# Patient Record
Sex: FEMALE | Race: WHITE | NOT HISPANIC OR LATINO | Employment: OTHER | URBAN - METROPOLITAN AREA
[De-identification: names, ages, dates, MRNs, and addresses within clinical notes are randomized per-mention and may not be internally consistent; named-entity substitution may affect disease eponyms.]

---

## 2019-08-12 ENCOUNTER — TELEPHONE (OUTPATIENT)
Dept: FAMILY MEDICINE CLINIC | Facility: CLINIC | Age: 79
End: 2019-08-12

## 2019-08-12 ENCOUNTER — OFFICE VISIT (OUTPATIENT)
Dept: FAMILY MEDICINE CLINIC | Facility: CLINIC | Age: 79
End: 2019-08-12

## 2019-08-12 VITALS
WEIGHT: 130.6 LBS | BODY MASS INDEX: 24.66 KG/M2 | DIASTOLIC BLOOD PRESSURE: 60 MMHG | HEART RATE: 93 BPM | TEMPERATURE: 98.2 F | OXYGEN SATURATION: 95 % | SYSTOLIC BLOOD PRESSURE: 118 MMHG | HEIGHT: 61 IN

## 2019-08-12 DIAGNOSIS — M25.571 ARTHRALGIA OF RIGHT ANKLE: ICD-10-CM

## 2019-08-12 DIAGNOSIS — K21.9 GASTROESOPHAGEAL REFLUX DISEASE WITHOUT ESOPHAGITIS: ICD-10-CM

## 2019-08-12 DIAGNOSIS — I10 ESSENTIAL HYPERTENSION: ICD-10-CM

## 2019-08-12 DIAGNOSIS — I51.9 HEART DISEASE: ICD-10-CM

## 2019-08-12 DIAGNOSIS — F41.9 ANXIETY: ICD-10-CM

## 2019-08-12 DIAGNOSIS — Z79.01 ANTICOAGULATED ON WARFARIN: ICD-10-CM

## 2019-08-12 DIAGNOSIS — I48.20 CHRONIC ATRIAL FIBRILLATION (HCC): Primary | ICD-10-CM

## 2019-08-12 PROBLEM — E78.5 HYPERLIPIDEMIA: Status: ACTIVE | Noted: 2019-08-12

## 2019-08-12 PROBLEM — I48.91 A-FIB (HCC): Status: ACTIVE | Noted: 2019-08-12

## 2019-08-12 LAB — INR PPP: 2 (ref 2–3)

## 2019-08-12 PROCEDURE — 36416 COLLJ CAPILLARY BLOOD SPEC: CPT | Performed by: INTERNAL MEDICINE

## 2019-08-12 PROCEDURE — 99213 OFFICE O/P EST LOW 20 MIN: CPT | Performed by: INTERNAL MEDICINE

## 2019-08-12 PROCEDURE — 85610 PROTHROMBIN TIME: CPT | Performed by: INTERNAL MEDICINE

## 2019-08-12 RX ORDER — METOPROLOL SUCCINATE 50 MG/1
50 TABLET, EXTENDED RELEASE ORAL DAILY
Qty: 90 TABLET | Refills: 3 | Status: SHIPPED | OUTPATIENT
Start: 2019-08-12 | End: 2020-09-09

## 2019-08-12 RX ORDER — PRAVASTATIN SODIUM 20 MG
TABLET ORAL
COMMUNITY
Start: 2017-07-24 | End: 2020-06-18 | Stop reason: SDUPTHER

## 2019-08-12 RX ORDER — WARFARIN SODIUM 3 MG/1
3 TABLET ORAL DAILY
COMMUNITY
End: 2019-08-12 | Stop reason: SDUPTHER

## 2019-08-12 RX ORDER — DILTIAZEM HYDROCHLORIDE 180 MG/1
180 CAPSULE, COATED, EXTENDED RELEASE ORAL DAILY
Qty: 90 CAPSULE | Refills: 1 | Status: SHIPPED | OUTPATIENT
Start: 2019-08-12 | End: 2020-01-06 | Stop reason: SDUPTHER

## 2019-08-12 RX ORDER — DIGOXIN 125 MCG
125 TABLET ORAL DAILY
Qty: 90 TABLET | Refills: 1 | Status: SHIPPED | OUTPATIENT
Start: 2019-08-12 | End: 2020-05-06 | Stop reason: SDUPTHER

## 2019-08-12 RX ORDER — DILTIAZEM HYDROCHLORIDE 180 MG/1
180 CAPSULE, COATED, EXTENDED RELEASE ORAL DAILY
COMMUNITY
End: 2019-08-12 | Stop reason: SDUPTHER

## 2019-08-12 RX ORDER — FUROSEMIDE 20 MG/1
TABLET ORAL
Qty: 270 TABLET | Refills: 1 | Status: SHIPPED | OUTPATIENT
Start: 2019-08-12 | End: 2020-01-06 | Stop reason: SDUPTHER

## 2019-08-12 RX ORDER — WARFARIN SODIUM 3 MG/1
3 TABLET ORAL DAILY
Qty: 90 TABLET | Refills: 1 | Status: SHIPPED | OUTPATIENT
Start: 2019-08-12 | End: 2019-09-16 | Stop reason: SDUPTHER

## 2019-08-12 RX ORDER — METOPROLOL SUCCINATE 100 MG/1
25 TABLET, EXTENDED RELEASE ORAL DAILY
COMMUNITY
End: 2019-08-12 | Stop reason: SDUPTHER

## 2019-08-12 RX ORDER — ESOMEPRAZOLE MAGNESIUM 40 MG/1
40 CAPSULE, DELAYED RELEASE ORAL DAILY
Qty: 90 CAPSULE | Refills: 1 | Status: SHIPPED | OUTPATIENT
Start: 2019-08-12 | End: 2020-01-06 | Stop reason: SDUPTHER

## 2019-08-12 RX ORDER — FUROSEMIDE 20 MG/1
TABLET ORAL
COMMUNITY
Start: 2017-07-26 | End: 2019-08-12 | Stop reason: SDUPTHER

## 2019-08-12 RX ORDER — ALPRAZOLAM 0.5 MG/1
0.5 TABLET ORAL
COMMUNITY
End: 2019-08-12 | Stop reason: SDUPTHER

## 2019-08-12 RX ORDER — ESOMEPRAZOLE MAGNESIUM 40 MG/1
40 CAPSULE, DELAYED RELEASE ORAL DAILY
COMMUNITY
End: 2019-08-12 | Stop reason: SDUPTHER

## 2019-08-12 RX ORDER — DIGOXIN 125 MCG
125 TABLET ORAL DAILY
COMMUNITY
End: 2019-08-12 | Stop reason: SDUPTHER

## 2019-08-12 RX ORDER — ALPRAZOLAM 0.5 MG/1
0.5 TABLET ORAL 3 TIMES DAILY PRN
Qty: 90 TABLET | Refills: 1 | Status: SHIPPED | OUTPATIENT
Start: 2019-08-12 | End: 2020-01-06 | Stop reason: SDUPTHER

## 2019-08-12 RX ORDER — ESTRADIOL 0.1 MG/G
1 CREAM VAGINAL 2 TIMES WEEKLY
COMMUNITY
Start: 2018-05-03 | End: 2022-11-17

## 2019-08-12 NOTE — TELEPHONE ENCOUNTER
Pharmacy called regarding the prescription for diclosenac sodium, they do not have and wants to know if there is something different he can take.  Phone number is [phone].

## 2019-08-12 NOTE — PROGRESS NOTES
Sweetie Rojo is a 79 y.o. female.     Chief Complaint   Patient presents with   • Labs Only     inr check   • Mitral Insufficiency       History of Present Illness   Here for follow up INR evaluation.  Currently anticoagulated with warfarin for atrial fib.  The patient is taking warfarin 3 mg tab 1/2 tab M/W/F and full tab rest of the week.  The current INR goal is 2-3.  The INR is currently 2.0.  No significant interval events, easy bleeding, bruising, epistaxis, fever, weakness or numbness.    Follow-up for hypertension.  Currently has been feeling well and asymptomatic without any headaches,vision changes, cough, chest pain, shortness of breath, swelling, focal neurologic deficit, memory loss or syncope.  Has been taking the medications regularly and adherent with the regimen, Denies medication side effects and no significant interval events.      The following portions of the patient's history were reviewed and updated as appropriate: allergies, current medications, past family history, past medical history, past social history, past surgical history and problem list.    Past Medical History:   Diagnosis Date   • A-fib (CMS/HCC)    • Abnormality of lung    • Acquired insufficiency of aortic valve    • Antral gastritis    • Anxiety    • Asymptomatic postmenopausal status    • Atrial fibrillation (CMS/HCC)    • Body mass index (BMI) 24.0-24.9, adult    • Chest pain, atypical    • COPD (chronic obstructive pulmonary disease) (CMS/HCC)    • Diverticulosis of colon    • ASHLEE (generalized anxiety disorder)    • GERD (gastroesophageal reflux disease)    • Heart disease    • Hematuria    • Hiatal hernia    • High risk medication use    • Hyperactive bowel sounds    • Hyperlipidemia    • Hypertension    • Insomnia    • Intermittent claudication (CMS/HCC)    • Internal hemorrhoids    • Lower extremity edema    • Mitral insufficiency    • KOKO (obstructive sleep apnea)    • Osteopenia    • Pacemaker    • Pain  of left patella    • Pedal edema    • Pulmonary hypertension (CMS/HCC)    • SSS (sick sinus syndrome) (CMS/HCC)    • Tricuspid regurgitation    • Urine frequency    • Uterovaginal prolapse    • Wheezing        Past Surgical History:   Procedure Laterality Date   • COLONOSCOPY  01/19/2015    diverticuli and hemorrhoids   • COLONOSCOPY  02/03/2009    diverticuli   • PACEMAKER IMPLANTATION     • PACEMAKER IMPLANTATION      SERIAL # [id number], model #[id number]   • UPPER GASTROINTESTINAL ENDOSCOPY  02/03/2009    small hiatal hernia   • UPPER GASTROINTESTINAL ENDOSCOPY  01/19/2015    sliding small hiatal hernia       Family History   Problem Relation Age of Onset   • Cancer Mother         oral-mouth   • Coronary artery disease Mother    • Cancer Father         oral-mouth   • Cancer Brother         cholangiocarcinoma/ oral-mouth   • Leukemia Brother        Social History     Socioeconomic History   • Marital status:      Spouse name: Not on file   • Number of children: Not on file   • Years of education: Not on file   • Highest education level: Not on file   Tobacco Use   • Smoking status: Former Smoker   Substance and Sexual Activity   • Alcohol use: No     Frequency: Never       Review of Systems   Constitutional: Negative for activity change, appetite change, unexpected weight gain and unexpected weight loss.   HENT: Negative for hearing loss, trouble swallowing and voice change.    Eyes: Negative for visual disturbance.   Respiratory: Negative for cough, choking, chest tightness and shortness of breath.    Cardiovascular: Negative for chest pain, palpitations and leg swelling.   Gastrointestinal: Negative for anal bleeding, nausea, vomiting and GERD.   Musculoskeletal: Positive for arthralgias. Negative for back pain and myalgias.   Neurological: Negative for tremors, weakness, numbness and headache.   Psychiatric/Behavioral: Negative for sleep disturbance and depressed mood. The patient is not nervous/anxious.         Objective   Vitals:    08/12/19 1116   BP: 118/60   Pulse: 93   Temp: 98.2 °F (36.8 °C)   SpO2: 95%     Body mass index is 24.68 kg/m².  Physical Exam   Constitutional: She is oriented to person, place, and time. She appears well-developed and well-nourished. No distress.   HENT:   Head: Normocephalic and atraumatic.   Right Ear: External ear normal.   Left Ear: External ear normal.   Nose: Nose normal.   Mouth/Throat: Oropharynx is clear and moist.   Eyes: Conjunctivae and EOM are normal. Pupils are equal, round, and reactive to light.   Neck: Normal range of motion. Neck supple. No tracheal deviation present. No thyromegaly present.   Cardiovascular: Normal rate, normal heart sounds and intact distal pulses. An irregularly irregular rhythm present. Exam reveals no gallop and no friction rub.   No murmur heard.  Pulmonary/Chest: Effort normal and breath sounds normal. No respiratory distress. She exhibits no tenderness.   Abdominal: Soft. Bowel sounds are normal. She exhibits no distension. There is no tenderness.   Musculoskeletal: Normal range of motion. She exhibits no edema or tenderness.   Right ankle with tenderness medially with mild swelling. No erythema or crepitus.   Lymphadenopathy:     She has no cervical adenopathy.   Neurological: She is alert and oriented to person, place, and time. She displays normal reflexes. No cranial nerve deficit or sensory deficit. Coordination normal.   Skin: Skin is warm. Capillary refill takes less than 2 seconds. She is not diaphoretic.   Left upper chest pacemaker site mild tender without erythema or open lesions.   Psychiatric: She has a normal mood and affect. Her behavior is normal. Judgment and thought content normal.   Nursing note and vitals reviewed.      Recent Results (from the past 2016 hour(s))   POC INR    Collection Time: 08/12/19 11:29 AM   Result Value Ref Range    INR 2.00 (NEGATIVE) 2 - 3     Assessment/Plan   Stacey was seen today for labs only  and mitral insufficiency.    Diagnoses and all orders for this visit:    Chronic atrial fibrillation (CMS/HCC)    Anticoagulated on warfarin    Essential hypertension    Anxiety    Heart disease    Gastroesophageal reflux disease without esophagitis    Arthralgia of right ankle  -     Diclofenac Sodium 3 % cream; Apply 2 g topically 2 (Two) Times a Day.    Other orders  -     POC INR  -     metoprolol succinate XL (TOPROL-XL) 50 MG 24 hr tablet; Take 1 tablet by mouth Daily.  -     digoxin (LANOXIN) 125 MCG tablet; Take 1 tablet by mouth Daily.  -     furosemide (LASIX) 20 MG tablet; Take 2 tablets by mouth Every Morning AND 1 tablet Every Evening.  -     diltiaZEM CD (CARDIZEM CD) 180 MG 24 hr capsule; Take 1 capsule by mouth Daily.  -     warfarin (COUMADIN) 3 MG tablet; Take 1 tablet by mouth Daily.  -     ALPRAZolam (XANAX) 0.5 MG tablet; Take 1 tablet by mouth 3 (Three) Times a Day As Needed for Anxiety.  -     esomeprazole (nexIUM) 40 MG capsule; Take 1 capsule by mouth Daily.    Continue the current medications unchanged including the warfarin.  Will try the topical diclofenac as is on warfarin and history of GI issues.  EMILIA run and reviewed.  Risks of the medication include but are not limited to fatigue, somnolence, increased risk of falls, constipation, allergic reaction, dependence, and addiction

## 2019-08-13 ENCOUNTER — TELEPHONE (OUTPATIENT)
Dept: OTHER | Facility: OTHER | Age: 79
End: 2019-08-13

## 2019-08-13 NOTE — TELEPHONE ENCOUNTER
Pts pharmacy called and they are just needing clarification on how many drops pt is supposed to apply of the diclofenac solution. They also said if you change it to the diclofenac gel they wouldn't need specific quantity. Please send over  rx.

## 2019-08-13 NOTE — TELEPHONE ENCOUNTER
Pt states that she spoke with the [facility] pharmacy and they told her that they were needing a preauth on the medication that was sent in yesterday after the patient's appointment. Please advise.

## 2019-08-15 ENCOUNTER — TELEPHONE (OUTPATIENT)
Dept: FAMILY MEDICINE CLINIC | Facility: CLINIC | Age: 79
End: 2019-08-15

## 2019-08-15 NOTE — TELEPHONE ENCOUNTER
The medication list sent from [place] [facility] had diltiazem(cardizem/cartia) on her active medication list.  If she was not taking it at the time of her visit with me then she should not take it now.

## 2019-08-15 NOTE — TELEPHONE ENCOUNTER
Patient says Diltiavem was filled. Says she hesnt taken in years and wants to know if something changed or if it was a mistake.   [phone]

## 2019-08-15 NOTE — TELEPHONE ENCOUNTER
Called patient, she said she hasn't taken diltiazem but she is taking cartia, can you please call patient to clarify what she is to be taking. thanks

## 2019-08-19 ENCOUNTER — TELEPHONE (OUTPATIENT)
Dept: FAMILY MEDICINE CLINIC | Facility: CLINIC | Age: 79
End: 2019-08-19

## 2019-08-19 NOTE — TELEPHONE ENCOUNTER
Patient called regarding her prescription for Warfarin.  She says the pharmacy is telling her it was denied.  When I look at the prescription, it states it was sent over on 08/12 and the pharmacy sent a receipt at 12:12.  She would like to check the status please.  [facility]acy is [facility].  Her phone number is [phone].

## 2019-09-09 ENCOUNTER — TELEPHONE (OUTPATIENT)
Dept: OTHER | Facility: OTHER | Age: 79
End: 2019-09-09

## 2019-09-09 NOTE — TELEPHONE ENCOUNTER
DR SARABIA--    PT IS ON BLOOD THINNERS AND HAD A NOSE BLEED OVER THE WEEKEND. SHE WANTED TO CHECK WITH DR SARABIA TO SEE IF HE THOUGHT SHE NEEDED TO COME IN AND SEE HIM OR NOT.    PLEASE RETURN PT CALL.    PT CONTACT [phone]

## 2019-09-16 ENCOUNTER — OFFICE VISIT (OUTPATIENT)
Dept: FAMILY MEDICINE CLINIC | Facility: CLINIC | Age: 79
End: 2019-09-16

## 2019-09-16 VITALS
SYSTOLIC BLOOD PRESSURE: 126 MMHG | DIASTOLIC BLOOD PRESSURE: 58 MMHG | WEIGHT: 131 LBS | BODY MASS INDEX: 24.73 KG/M2 | HEART RATE: 95 BPM | HEIGHT: 61 IN | TEMPERATURE: 98.7 F | OXYGEN SATURATION: 95 %

## 2019-09-16 DIAGNOSIS — E78.5 HYPERLIPIDEMIA, UNSPECIFIED HYPERLIPIDEMIA TYPE: ICD-10-CM

## 2019-09-16 DIAGNOSIS — Z00.00 MEDICARE ANNUAL WELLNESS VISIT, SUBSEQUENT: Primary | ICD-10-CM

## 2019-09-16 DIAGNOSIS — Z79.01 ANTICOAGULATED ON WARFARIN: ICD-10-CM

## 2019-09-16 DIAGNOSIS — I10 ESSENTIAL HYPERTENSION: ICD-10-CM

## 2019-09-16 DIAGNOSIS — I48.20 CHRONIC ATRIAL FIBRILLATION (HCC): ICD-10-CM

## 2019-09-16 LAB — INR PPP: 1.9 (ref 2–3)

## 2019-09-16 PROCEDURE — 36416 COLLJ CAPILLARY BLOOD SPEC: CPT | Performed by: INTERNAL MEDICINE

## 2019-09-16 PROCEDURE — 85610 PROTHROMBIN TIME: CPT | Performed by: INTERNAL MEDICINE

## 2019-09-16 PROCEDURE — G0439 PPPS, SUBSEQ VISIT: HCPCS | Performed by: INTERNAL MEDICINE

## 2019-09-16 PROCEDURE — 99213 OFFICE O/P EST LOW 20 MIN: CPT | Performed by: INTERNAL MEDICINE

## 2019-09-16 RX ORDER — WARFARIN SODIUM 3 MG/1
3 TABLET ORAL DAILY
Qty: 90 TABLET | Refills: 1 | Status: SHIPPED | OUTPATIENT
Start: 2019-09-16 | End: 2020-04-15 | Stop reason: SDUPTHER

## 2019-09-16 NOTE — PROGRESS NOTES
Subsequent Medicare Wellness Visit   The ABC's of the Annual Wellness Visit    Chief Complaint   Patient presents with   • Annual Exam     medicare   • Atrial Fibrillation   • Hypertension   • Hyperlipidemia       HPI:  Stacey Rojo, -1940, is a 79 y.o. female who presents for a Subsequent Medicare Wellness Visit.  Granddaughter was present during the history-taking and subsequent discussion (and for part of the physical exam) with this patient.  Patient agrees to the presence of the individual during this visit.  Here for follow up INR evaluation.  Currently anticoagulated with warfarin for atrial fibrillation.  The patient is taking warfarin 3mg T, Th, S, S and 1.5 mg M,W,.  The current INR goal is 2-3.  The INR is currently 1.9.  No significant interval events such as easy bleeding, bruising, fever, weakness or numbness.  She did have an episode of significant nosebleed over the weekend but it did self limit its course.  Further after that episode.  Follow-up for cholesterol.  Currently has been feeling well without any myalgias, muscle aches, weakness, numbness, chest pain, short of breath or other issues.  Currently is adherent with medication regimen and denies medication side effects. Is due for lab follow-up.  Follow-up for hypertension.  Currently has been feeling well and asymptomatic without any headaches,vision changes, cough, chest pain, shortness of breath, swelling, focal neurologic deficit, memory loss or syncope.  Has been taking the medications regularly and adherent with the regimen, Denies medication side effects and no significant interval events.     Recent Hospitalizations:  No hospitalization(s) within the last year..    Current Medical Providers:  Patient Care Team:  Serg Varela MD as PCP - General (Internal Medicine)   Dr Cowart - urogynecologist  Dr Wing - cardiology  Dr Dubois - podiatry.    Health Habits and Functional and Cognitive Screening and Depression  Screening:  Functional & Cognitive Status 9/16/2019   Do you have difficulty preparing food and eating? No   Do you have difficulty bathing yourself, getting dressed or grooming yourself? No   Do you have difficulty using the toilet? No   Do you have difficulty moving around from place to place? No   Do you have trouble with steps or getting out of a bed or a chair? No   Current Diet Low Fat Diet   Dental Exam Not up to date   Eye Exam Up to date   Exercise (times per week) 0 times per week   Current Exercise Activities Include None   Do you need help using the phone?  No   Are you deaf or do you have serious difficulty hearing?  No   Do you need help with transportation? No   Do you need help shopping? No   Do you need help preparing meals?  No   Do you need help with housework?  No   Do you need help with laundry? No   Do you need help taking your medications? No   Do you need help managing money? No   Do you ever drive or ride in a car without wearing a seat belt? No   Have you felt unusual stress, anger or loneliness in the last month? No   Who do you live with? Child   If you need help, do you have trouble finding someone available to you? No   Have you been bothered in the last four weeks by sexual problems? No   Do you have difficulty concentrating, remembering or making decisions? No       Compared to one year ago, the patient feels her physical health is the same and her mental health is the same.    Depression Screen:  PHQ-2/PHQ-9 Depression Screening 9/16/2019   Little interest or pleasure in doing things 0   Feeling down, depressed, or hopeless 0   Total Score 0         Past Medical/Family/Social History:  The following portions of the patient's history were reviewed and updated as appropriate: allergies, current medications, past family history, past medical history, past social history, past surgical history and problem list.    No Known Allergies      Current Outpatient Medications:   •  ALPRAZolam  (XANAX) 0.5 MG tablet, Take 1 tablet by mouth 3 (Three) Times a Day As Needed for Anxiety., Disp: 90 tablet, Rfl: 1  •  digoxin (LANOXIN) 125 MCG tablet, Take 1 tablet by mouth Daily., Disp: 90 tablet, Rfl: 1  •  esomeprazole (nexIUM) 40 MG capsule, Take 1 capsule by mouth Daily., Disp: 90 capsule, Rfl: 1  •  estradiol (ESTRACE) 0.1 MG/GM vaginal cream, Insert 1 g into the vagina 2 (Two) Times a Week., Disp: , Rfl:   •  furosemide (LASIX) 20 MG tablet, Take 2 tablets by mouth Every Morning AND 1 tablet Every Evening., Disp: 270 tablet, Rfl: 1  •  metoprolol succinate XL (TOPROL-XL) 50 MG 24 hr tablet, Take 1 tablet by mouth Daily., Disp: 90 tablet, Rfl: 3  •  pravastatin (PRAVACHOL) 20 MG tablet, , Disp: , Rfl:   •  warfarin (COUMADIN) 3 MG tablet, Take 1 tablet by mouth Daily., Disp: 90 tablet, Rfl: 1  •  diclofenac (VOLTAREN) 1 % gel gel, Apply 4 g topically to the appropriate area as directed 4 (Four) Times a Day As Needed (pain and arthralgia)., Disp: 100 g, Rfl: 3  •  diltiaZEM CD (CARDIZEM CD) 180 MG 24 hr capsule, Take 1 capsule by mouth Daily., Disp: 90 capsule, Rfl: 1  •  PROBIOTIC PRODUCT PO, Take  by mouth., Disp: , Rfl:     Aspirin use counseling: Does not need ASA (and currently is not on it)    Current medication list contains no high risk medications.  No harmful drug interactions have been identified.     Family History   Problem Relation Age of Onset   • Cancer Mother         oral-mouth   • Coronary artery disease Mother    • Cancer Father         oral-mouth   • Cancer Brother         cholangiocarcinoma/ oral-mouth   • Leukemia Brother        Social History     Tobacco Use   • Smoking status: Former Smoker   • Smokeless tobacco: Never Used   Substance Use Topics   • Alcohol use: No     Frequency: Never       Past Surgical History:   Procedure Laterality Date   • COLONOSCOPY  01/19/2015    diverticuli and hemorrhoids   • COLONOSCOPY  02/03/2009    diverticuli   • PACEMAKER IMPLANTATION     •  "PACEMAKER IMPLANTATION      SERIAL # [id number], model #[id number]   • UPPER GASTROINTESTINAL ENDOSCOPY  02/03/2009    small hiatal hernia   • UPPER GASTROINTESTINAL ENDOSCOPY  01/19/2015    sliding small hiatal hernia       Patient Active Problem List   Diagnosis   • Anticoagulated on warfarin   • A-fib (CMS/HCC)   • Hypertension   • Anxiety   • Hyperlipidemia   • GERD (gastroesophageal reflux disease)   • Heart disease   • Arthralgia of right ankle   • Atrophic vaginitis   • Cervical polyp   • Pacemaker   • Medicare annual wellness visit, subsequent       Review of Systems   Constitutional: Negative for activity change, appetite change, fever and unexpected weight change.   HENT: Negative for sinus pressure, sore throat and trouble swallowing.    Eyes: Negative for visual disturbance.   Respiratory: Negative for choking, chest tightness, shortness of breath and wheezing.    Cardiovascular: Negative for chest pain, palpitations and leg swelling.   Gastrointestinal: Negative for abdominal distention, abdominal pain, blood in stool, constipation, diarrhea, nausea and vomiting.   Endocrine: Negative for polyuria.   Genitourinary: Negative for dysuria, hematuria and urgency.   Musculoskeletal: Negative for back pain and gait problem.   Skin: Negative for color change and rash.   Neurological: Negative for dizziness, seizures and numbness.   Hematological: Negative for adenopathy. Does not bruise/bleed easily.   Psychiatric/Behavioral: Negative for confusion. The patient is not nervous/anxious.        Objective     Vitals:    09/16/19 1009   BP: 126/58   BP Location: Right arm   Patient Position: Sitting   Cuff Size: Large Adult   Pulse: 95   Temp: 98.7 °F (37.1 °C)   SpO2: 95%   Weight: 59.4 kg (131 lb)   Height: 154.9 cm (61\")       Patient's Body mass index is 24.75 kg/m². BMI is within normal parameters. No follow-up required..      No exam data present    The patient has no evidence of cognitve impairment.     Physical " Exam   Constitutional: She is oriented to person, place, and time. She appears well-developed and well-nourished. No distress.   HENT:   Head: Normocephalic and atraumatic.   Right Ear: External ear normal.   Left Ear: External ear normal.   Nose: Nose normal.   Mouth/Throat: Oropharynx is clear and moist.   Eyes: Conjunctivae and EOM are normal. Pupils are equal, round, and reactive to light.   Neck: Normal range of motion. Neck supple. No tracheal deviation present. No thyromegaly present.   Cardiovascular: Normal rate, regular rhythm, normal heart sounds and intact distal pulses. Exam reveals no gallop and no friction rub.   No murmur heard.  Pulmonary/Chest: Effort normal and breath sounds normal. No respiratory distress.   Abdominal: Soft. Bowel sounds are normal. She exhibits no mass. There is no tenderness. There is no guarding.   Musculoskeletal: Normal range of motion. She exhibits no edema.   Lymphadenopathy:     She has no cervical adenopathy.   Neurological: She is alert and oriented to person, place, and time. She displays normal reflexes. She exhibits normal muscle tone.   Skin: Skin is warm and dry. Capillary refill takes less than 2 seconds. No rash noted. She is not diaphoretic.   Psychiatric: She has a normal mood and affect. Her behavior is normal. Judgment and thought content normal.   Nursing note and vitals reviewed.      Recent Lab Results:          Assessment/Plan   Age-appropriate Screening Schedule:  Refer to the list below for future screening recommendations based on patient's age, sex and/or medical conditions.      Health Maintenance   Topic Date Due   • TDAP/TD VACCINES (1 - Tdap) 05/24/1961   • ZOSTER VACCINE (1 of 2) 05/24/1992   • INFLUENZA VACCINE  08/01/2019   • LIPID PANEL  08/09/2019   • DXA SCAN  08/09/2019   • PNEUMOCOCCAL VACCINES (65+ LOW/MEDIUM RISK)  Completed       Medicare Risks and Personalized Health Plan:  Advance Directive Discussion  Fall Risk  Glaucoma  Risk      CMS-Preventive Services Quick Reference  Medicare Preventive Services Addressed:  Annual Wellness Visit (AWV)  Cardiovascular Disease Screening Tests (may do this order every 5 years in beneficiaries without signs or symptoms of cardiovascular disease)  Diabetes Screening-Lab Order for either glucose quantitative blood (except reagent strip), glucose;post glucose dose(includes glucose), or glucose tolerance test-3 specimens(includes glucose)  Glaucoma screening (for individuals with diabetes mellitus, family history of glaucoma, -Americans (> or =) age 50, -Americans (> or =) age 65)  Influenza Vaccine and Administration    Advance Care Planning:  Patient does not have an advance directive - information provided to the patient today    Diagnoses and all orders for this visit:    1. Medicare annual wellness visit, subsequent (Primary)  -     Comprehensive Metabolic Panel  -     Lipid Panel  -     POC INR    2. Anticoagulated on warfarin  -     CBC & Differential  -     POC INR  -     warfarin (COUMADIN) 3 MG tablet; Take 1 tablet by mouth Daily.  Dispense: 90 tablet; Refill: 1    3. Chronic atrial fibrillation (CMS/HCC)  -     CBC & Differential  -     POC INR  -     warfarin (COUMADIN) 3 MG tablet; Take 1 tablet by mouth Daily.  Dispense: 90 tablet; Refill: 1    4. Essential hypertension  -     Comprehensive Metabolic Panel  -     Lipid Panel  -     POC INR    5. Hyperlipidemia, unspecified hyperlipidemia type  -     Comprehensive Metabolic Panel  -     Lipid Panel  -     POC INR        An After Visit Summary and PPPS with all of these plans were given to the patient.      Follow Up:  No Follow-up on file.        Discussed the flu shot to be given annually in the fall.  Continue to follow up with the multiple specialists including the eye doctor.  We will check the labs today for cardiovascular screening for the cholesterol complete metabolic panel including the blood sugars.  Discussed  advanced directive and living will patient is interested but then not interested at this time will consider and discuss with her family.  Discussed fall risk and to avoid throw rugs in the home use grab bars in the bathroom to continue follow-up with podiatry including the use of her inserts.  Discussed diet taking continue with a low-fat healthy heart diet and exercise as tolerated but this is limited due to her foot.  Encourage getting the shingles Shingrix series of shots.  Follow-up with cardiology as scheduled as well as the podiatrist as scheduled.  Patient states has had a bone density within the last few years we will try and get the records of this.  Will continue the current warfarin dosage unchanged and follow up in 1 month.  If persist or recurrent nose bleeds then follow up or to ENT.

## 2019-09-16 NOTE — PATIENT INSTRUCTIONS
Medicare Wellness  Personal Prevention Plan of Service     Date of Office Visit:  2019  Encounter Provider:  Serg Varela MD  Place of Service:  [facility]  Patient Name: Stacey Rojo  :  1940    As part of the Medicare Wellness portion of your visit today, we are providing you with this personalized preventive plan of services (PPPS). This plan is based upon recommendations of the [place] Preventive Services Task Force (USPSTF) and the Advisory Committee on Immunization Practices (ACIP).    This lists the preventive care services that should be considered, and provides dates of when you are due. Items listed as completed are up-to-date and do not require any further intervention.    Health Maintenance   Topic Date Due   • TDAP/TD VACCINES (1 - Tdap) 1961   • ZOSTER VACCINE (1 of 2) 1992   • INFLUENZA VACCINE  2019   • MEDICARE ANNUAL WELLNESS  2019   • LIPID PANEL  2019   • DXA SCAN  2019   • PNEUMOCOCCAL VACCINES (65+ LOW/MEDIUM RISK)  Completed       Orders Placed This Encounter   Procedures   • Comprehensive Metabolic Panel   • Lipid Panel   • POC INR     This is an external result entered through the Results Console.   • CBC & Differential     Order Specific Question:   Manual Differential     Answer:   No       No Follow-up on file.

## 2019-09-17 LAB
ALBUMIN SERPL-MCNC: 4.6 G/DL (ref 3.5–5.2)
ALBUMIN/GLOB SERPL: 1.6 G/DL
ALP SERPL-CCNC: 80 U/L (ref 39–117)
ALT SERPL-CCNC: 12 U/L (ref 1–33)
AST SERPL-CCNC: 17 U/L (ref 1–32)
BASOPHILS # BLD AUTO: 0.09 10*3/MM3 (ref 0–0.2)
BASOPHILS NFR BLD AUTO: 1 % (ref 0–1.5)
BILIRUB SERPL-MCNC: 0.4 MG/DL (ref 0.2–1.2)
BUN SERPL-MCNC: 9 MG/DL (ref 8–23)
BUN/CREAT SERPL: 12.2 (ref 7–25)
CALCIUM SERPL-MCNC: 9.4 MG/DL (ref 8.6–10.5)
CHLORIDE SERPL-SCNC: 101 MMOL/L (ref 98–107)
CHOLEST SERPL-MCNC: 173 MG/DL (ref 0–200)
CO2 SERPL-SCNC: 26.8 MMOL/L (ref 22–29)
CREAT SERPL-MCNC: 0.74 MG/DL (ref 0.57–1)
EOSINOPHIL # BLD AUTO: 0.08 10*3/MM3 (ref 0–0.4)
EOSINOPHIL NFR BLD AUTO: 0.9 % (ref 0.3–6.2)
ERYTHROCYTE [DISTWIDTH] IN BLOOD BY AUTOMATED COUNT: 17.4 % (ref 12.3–15.4)
GLOBULIN SER CALC-MCNC: 2.9 GM/DL
GLUCOSE SERPL-MCNC: 104 MG/DL (ref 65–99)
HCT VFR BLD AUTO: 37.4 % (ref 34–46.6)
HDLC SERPL-MCNC: 47 MG/DL (ref 40–60)
HGB BLD-MCNC: 10.9 G/DL (ref 12–15.9)
IMM GRANULOCYTES # BLD AUTO: 0.02 10*3/MM3 (ref 0–0.05)
IMM GRANULOCYTES NFR BLD AUTO: 0.2 % (ref 0–0.5)
LDLC SERPL CALC-MCNC: 97 MG/DL (ref 0–100)
LYMPHOCYTES # BLD AUTO: 2.6 10*3/MM3 (ref 0.7–3.1)
LYMPHOCYTES NFR BLD AUTO: 28.2 % (ref 19.6–45.3)
MCH RBC QN AUTO: 23.9 PG (ref 26.6–33)
MCHC RBC AUTO-ENTMCNC: 29.1 G/DL (ref 31.5–35.7)
MCV RBC AUTO: 82 FL (ref 79–97)
MONOCYTES # BLD AUTO: 0.69 10*3/MM3 (ref 0.1–0.9)
MONOCYTES NFR BLD AUTO: 7.5 % (ref 5–12)
NEUTROPHILS # BLD AUTO: 5.75 10*3/MM3 (ref 1.7–7)
NEUTROPHILS NFR BLD AUTO: 62.2 % (ref 42.7–76)
NRBC BLD AUTO-RTO: 0 /100 WBC (ref 0–0.2)
PLATELET # BLD AUTO: 364 10*3/MM3 (ref 140–450)
POTASSIUM SERPL-SCNC: 3.8 MMOL/L (ref 3.5–5.2)
PROT SERPL-MCNC: 7.5 G/DL (ref 6–8.5)
RBC # BLD AUTO: 4.56 10*6/MM3 (ref 3.77–5.28)
SODIUM SERPL-SCNC: 144 MMOL/L (ref 136–145)
TRIGL SERPL-MCNC: 145 MG/DL (ref 0–150)
VLDLC SERPL CALC-MCNC: 29 MG/DL
WBC # BLD AUTO: 9.23 10*3/MM3 (ref 3.4–10.8)

## 2019-10-24 ENCOUNTER — OFFICE VISIT (OUTPATIENT)
Dept: FAMILY MEDICINE CLINIC | Facility: CLINIC | Age: 79
End: 2019-10-24

## 2019-10-24 VITALS
SYSTOLIC BLOOD PRESSURE: 130 MMHG | HEIGHT: 61 IN | OXYGEN SATURATION: 95 % | BODY MASS INDEX: 24.43 KG/M2 | HEART RATE: 96 BPM | DIASTOLIC BLOOD PRESSURE: 72 MMHG | WEIGHT: 129.4 LBS | TEMPERATURE: 97.8 F

## 2019-10-24 DIAGNOSIS — I48.91 ATRIAL FIBRILLATION, UNSPECIFIED TYPE (HCC): ICD-10-CM

## 2019-10-24 DIAGNOSIS — Z79.01 ANTICOAGULATED ON WARFARIN: Primary | ICD-10-CM

## 2019-10-24 DIAGNOSIS — M25.571 ARTHRALGIA OF RIGHT ANKLE: ICD-10-CM

## 2019-10-24 DIAGNOSIS — R04.0 EPISTAXIS, RECURRENT: ICD-10-CM

## 2019-10-24 LAB — INR PPP: 1.9 (ref 2–3)

## 2019-10-24 PROCEDURE — 36416 COLLJ CAPILLARY BLOOD SPEC: CPT | Performed by: INTERNAL MEDICINE

## 2019-10-24 PROCEDURE — 85610 PROTHROMBIN TIME: CPT | Performed by: INTERNAL MEDICINE

## 2019-10-24 PROCEDURE — 99213 OFFICE O/P EST LOW 20 MIN: CPT | Performed by: INTERNAL MEDICINE

## 2019-10-24 NOTE — PROGRESS NOTES
Sweetie Rojo is a 79 y.o. female.     Chief Complaint   Patient presents with   • Pain     and burning in right ankle   • nose bleeds   • Anticoagulation       History of Present Illness   Here for follow up INR evaluation.  Currently anticoagulated with warfarin for atrial fibrillation.  The patient is taking warfarin 3mg T, Th, S, S and 1.5 mg M,W,F.  The current INR goal is 2-3.  The INR is currently 1.9.  No significant interval events such as easy bleeding, bruising, fever, weakness or numbness.  She has had several episodes (4) of significant nosebleed over the last 1 month but it did self limit its course.   Follow-up for cholesterol.  Currently has been feeling well without any myalgias, muscle aches, weakness, numbness, chest pain, short of breath or other issues.  Currently is adherent with medication regimen and denies medication side effects. Is due for lab follow-up.  Follow-up for hypertension.  Currently has been feeling well and asymptomatic without any headaches,vision changes, cough, chest pain, shortness of breath, swelling, focal neurologic deficit, memory loss or syncope.  Has been taking the medications regularly and adherent with the regimen. Denies medication side effects and no significant interval events.     The following portions of the patient's history were reviewed and updated as appropriate: allergies, current medications, past family history, past medical history, past social history, past surgical history and problem list.    Past Medical History:   Diagnosis Date   • A-fib (CMS/HCC)    • Abnormality of lung    • Acquired insufficiency of aortic valve    • Antral gastritis    • Anxiety    • Asymptomatic postmenopausal status    • Atrial fibrillation (CMS/HCC)    • Body mass index (BMI) 24.0-24.9, adult    • Chest pain, atypical    • COPD (chronic obstructive pulmonary disease) (CMS/HCC)    • Diverticulosis of colon    • ASHLEE (generalized anxiety disorder)    • GERD  (gastroesophageal reflux disease)    • Heart disease    • Hiatal hernia    • High risk medication use    • Hyperactive bowel sounds    • Hyperlipidemia    • Hypertension    • Insomnia    • Intermittent claudication (CMS/HCC)    • Internal hemorrhoids    • Lower extremity edema    • Mitral insufficiency    • KOKO (obstructive sleep apnea)    • Osteopenia    • Pacemaker    • Pain of left patella    • Pedal edema    • Pulmonary hypertension (CMS/HCC)    • SSS (sick sinus syndrome) (CMS/HCC)    • Tricuspid regurgitation    • Urine frequency    • Uterovaginal prolapse    • Wheezing        Past Surgical History:   Procedure Laterality Date   • COLONOSCOPY  01/19/2015    diverticuli and hemorrhoids   • COLONOSCOPY  02/03/2009    diverticuli   • PACEMAKER IMPLANTATION     • PACEMAKER IMPLANTATION      SERIAL # [id number], model #W1DR01   • UPPER GASTROINTESTINAL ENDOSCOPY  02/03/2009    small hiatal hernia   • UPPER GASTROINTESTINAL ENDOSCOPY  01/19/2015    sliding small hiatal hernia       Family History   Problem Relation Age of Onset   • Cancer Mother         oral-mouth   • Coronary artery disease Mother    • Cancer Father         oral-mouth   • Cancer Brother         cholangiocarcinoma/ oral-mouth   • Leukemia Brother        Social History     Socioeconomic History   • Marital status:      Spouse name: Not on file   • Number of children: Not on file   • Years of education: Not on file   • Highest education level: Not on file   Tobacco Use   • Smoking status: Former Smoker   • Smokeless tobacco: Never Used   Substance and Sexual Activity   • Alcohol use: No     Frequency: Never   • Drug use: No   • Sexual activity: Defer       Review of Systems   Constitutional: Negative for activity change, appetite change, fatigue, fever, unexpected weight gain and unexpected weight loss.   HENT: Negative for nosebleeds, rhinorrhea, trouble swallowing and voice change.    Eyes: Negative for visual disturbance.   Respiratory:  Negative for cough, chest tightness, shortness of breath and wheezing.    Cardiovascular: Negative for chest pain, palpitations and leg swelling.   Gastrointestinal: Negative for abdominal pain, blood in stool, constipation, diarrhea, nausea, vomiting, GERD and indigestion.   Genitourinary: Negative for dysuria, frequency and hematuria.   Musculoskeletal: Negative for arthralgias, back pain and myalgias.   Skin: Negative for rash and bruise.   Neurological: Negative for dizziness, tremors, weakness, light-headedness, numbness, headache and memory problem.   Hematological: Negative for adenopathy. Does not bruise/bleed easily.   Psychiatric/Behavioral: Negative for sleep disturbance and depressed mood. The patient is not nervous/anxious.        Objective   Vitals:    10/24/19 1034   BP: 130/72   Pulse: 96   Temp: 97.8 °F (36.6 °C)   SpO2: 95%     Body mass index is 24.46 kg/m².  Physical Exam   Constitutional: She is oriented to person, place, and time. She appears well-developed and well-nourished. No distress.   HENT:   Head: Normocephalic and atraumatic.   Right Ear: External ear normal.   Left Ear: External ear normal.   Nose: Nose normal.   Mouth/Throat: Oropharynx is clear and moist.   Eyes: Conjunctivae and EOM are normal. Pupils are equal, round, and reactive to light.   Neck: Normal range of motion. Neck supple. No tracheal deviation present. No thyromegaly present.   Cardiovascular: Normal rate, regular rhythm, normal heart sounds and intact distal pulses. Exam reveals no gallop and no friction rub.   No murmur heard.  Pulmonary/Chest: Effort normal and breath sounds normal. No respiratory distress.   Abdominal: Soft. Bowel sounds are normal. She exhibits no mass. There is no tenderness. There is no guarding.   Musculoskeletal: Normal range of motion. She exhibits no edema.   Lymphadenopathy:     She has no cervical adenopathy.   Neurological: She is alert and oriented to person, place, and time. She  displays normal reflexes. She exhibits normal muscle tone.   Skin: Skin is warm and dry. Capillary refill takes less than 2 seconds. No rash noted. She is not diaphoretic.   Psychiatric: She has a normal mood and affect. Her behavior is normal. Judgment and thought content normal.   Nursing note and vitals reviewed.      Recent Results (from the past 2016 hour(s))   POC INR    Collection Time: 08/12/19 11:29 AM   Result Value Ref Range    INR 2.00 (NEGATIVE) 2 - 3   POC INR    Collection Time: 09/16/19 11:21 AM   Result Value Ref Range    INR 1.90 (A) 2 - 3   Comprehensive Metabolic Panel    Collection Time: 09/16/19 11:33 AM   Result Value Ref Range    Glucose 104 (H) 65 - 99 mg/dL    BUN 9 8 - 23 mg/dL    Creatinine 0.74 0.57 - 1.00 mg/dL    eGFR Non African Am 76 >60 mL/min/1.73    eGFR African Am 92 >60 mL/min/1.73    BUN/Creatinine Ratio 12.2 7.0 - 25.0    Sodium 144 136 - 145 mmol/L    Potassium 3.8 3.5 - 5.2 mmol/L    Chloride 101 98 - 107 mmol/L    Total CO2 26.8 22.0 - 29.0 mmol/L    Calcium 9.4 8.6 - 10.5 mg/dL    Total Protein 7.5 6.0 - 8.5 g/dL    Albumin 4.60 3.50 - 5.20 g/dL    Globulin 2.9 gm/dL    A/G Ratio 1.6 g/dL    Total Bilirubin 0.4 0.2 - 1.2 mg/dL    Alkaline Phosphatase 80 39 - 117 U/L    AST (SGOT) 17 1 - 32 U/L    ALT (SGPT) 12 1 - 33 U/L   Lipid Panel    Collection Time: 09/16/19 11:33 AM   Result Value Ref Range    Total Cholesterol 173 0 - 200 mg/dL    Triglycerides 145 0 - 150 mg/dL    HDL Cholesterol 47 40 - 60 mg/dL    VLDL Cholesterol 29 mg/dL    LDL Cholesterol  97 0 - 100 mg/dL   CBC & Differential    Collection Time: 09/16/19 11:33 AM   Result Value Ref Range    WBC 9.23 3.40 - 10.80 10*3/mm3    RBC 4.56 3.77 - 5.28 10*6/mm3    Hemoglobin 10.9 (L) 12.0 - 15.9 g/dL    Hematocrit 37.4 34.0 - 46.6 %    MCV 82.0 79.0 - 97.0 fL    MCH 23.9 (L) 26.6 - 33.0 pg    MCHC 29.1 (L) 31.5 - 35.7 g/dL    RDW 17.4 (H) 12.3 - 15.4 %    Platelets 364 140 - 450 10*3/mm3    Neutrophil Rel % 62.2 42.7  - 76.0 %    Lymphocyte Rel % 28.2 19.6 - 45.3 %    Monocyte Rel % 7.5 5.0 - 12.0 %    Eosinophil Rel % 0.9 0.3 - 6.2 %    Basophil Rel % 1.0 0.0 - 1.5 %    Neutrophils Absolute 5.75 1.70 - 7.00 10*3/mm3    Lymphocytes Absolute 2.60 0.70 - 3.10 10*3/mm3    Monocytes Absolute 0.69 0.10 - 0.90 10*3/mm3    Eosinophils Absolute 0.08 0.00 - 0.40 10*3/mm3    Basophils Absolute 0.09 0.00 - 0.20 10*3/mm3    Immature Granulocyte Rel % 0.2 0.0 - 0.5 %    Immature Grans Absolute 0.02 0.00 - 0.05 10*3/mm3    nRBC 0.0 0.0 - 0.2 /100 WBC   POC INR    Collection Time: 10/24/19 10:42 AM   Result Value Ref Range    INR 1.90 (A) 2 - 3     Assessment/Plan   Stacey was seen today for pain, nose bleeds and anticoagulation.    Diagnoses and all orders for this visit:    Anticoagulated on warfarin  -     Ambulatory Referral to ENT (Otolaryngology)    Atrial fibrillation, unspecified type (CMS/HCC)    Epistaxis, recurrent  -     Ambulatory Referral to ENT (Otolaryngology)    Arthralgia of right ankle    Other orders  -     POC INR    Will continue the current warfarin dose and discussed the diet to avoid green leafy vegetables which she has been eating.  Will have ENT evaluate for the recurrent nose bleeds while on warfarin.

## 2019-11-25 ENCOUNTER — OFFICE VISIT (OUTPATIENT)
Dept: FAMILY MEDICINE CLINIC | Facility: CLINIC | Age: 79
End: 2019-11-25

## 2019-11-25 VITALS
WEIGHT: 127.4 LBS | TEMPERATURE: 97.9 F | OXYGEN SATURATION: 96 % | HEART RATE: 87 BPM | HEIGHT: 61 IN | SYSTOLIC BLOOD PRESSURE: 122 MMHG | DIASTOLIC BLOOD PRESSURE: 60 MMHG | BODY MASS INDEX: 24.05 KG/M2

## 2019-11-25 DIAGNOSIS — Z12.31 SCREENING MAMMOGRAM, ENCOUNTER FOR: ICD-10-CM

## 2019-11-25 DIAGNOSIS — Z79.01 ANTICOAGULATED ON WARFARIN: Primary | ICD-10-CM

## 2019-11-25 DIAGNOSIS — I48.91 ATRIAL FIBRILLATION, UNSPECIFIED TYPE (HCC): ICD-10-CM

## 2019-11-25 DIAGNOSIS — H10.13 ALLERGIC CONJUNCTIVITIS OF BOTH EYES: ICD-10-CM

## 2019-11-25 DIAGNOSIS — M79.2 NEUROPATHIC PAIN OF RIGHT ANKLE: ICD-10-CM

## 2019-11-25 PROBLEM — R04.0 EPISTAXIS, RECURRENT: Status: RESOLVED | Noted: 2019-10-24 | Resolved: 2019-11-25

## 2019-11-25 LAB — INR PPP: 2.5 (ref 2–3)

## 2019-11-25 PROCEDURE — 85610 PROTHROMBIN TIME: CPT | Performed by: INTERNAL MEDICINE

## 2019-11-25 PROCEDURE — 36416 COLLJ CAPILLARY BLOOD SPEC: CPT | Performed by: INTERNAL MEDICINE

## 2019-11-25 PROCEDURE — 99213 OFFICE O/P EST LOW 20 MIN: CPT | Performed by: INTERNAL MEDICINE

## 2019-11-25 RX ORDER — LIDOCAINE 5 G/100G
CREAM RECTAL; TOPICAL 3 TIMES DAILY PRN
Status: DISCONTINUED | OUTPATIENT
Start: 2019-11-25 | End: 2019-11-27

## 2019-11-25 RX ORDER — OLOPATADINE HYDROCHLORIDE 1 MG/ML
1 SOLUTION/ DROPS OPHTHALMIC 2 TIMES DAILY
Qty: 5 ML | Refills: 1 | Status: SHIPPED | OUTPATIENT
Start: 2019-11-25 | End: 2022-11-17

## 2019-11-25 NOTE — PROGRESS NOTES
Subjective   Stacey Rojo is a 79 y.o. female.     Chief Complaint   Patient presents with   • Atrial Fibrillation     Follow Up with INR       History of Present Illness   Here for follow up INR evaluation.  Currently anticoagulated with warfarin for atrial fibrillation.  The patient is taking warfarin 3mg T, Th, S, S and 1.5 mg M,W,F.  The current INR goal is 2-3.  The INR is currently 1.9.  No significant interval events such as easy bleeding, bruising, fever, weakness or numbness.  She has had several episodes (4) of significant nosebleed over the last 1 month but it did self limit its course.   Follow-up for cholesterol.  Currently has been feeling well without any myalgias, muscle aches, weakness, numbness, chest pain, short of breath or other issues.  Currently is adherent with medication regimen and denies medication side effects. Is due for lab follow-up.  Follow-up for hypertension.  Currently has been feeling well and asymptomatic without any headaches,vision changes, cough, chest pain, shortness of breath, swelling, focal neurologic deficit, memory loss or syncope.  Has been taking the medications regularly and adherent with the regimen. Denies medication side effects and no significant interval events.   3-4 weeks of red and watery eyes.  Had been using the eye drops for dry eyes with no relief.  Patient with right ankle pain on the inside that is chronic and worse with standing and walking.  Diclofenac topical no relief and Icy hot no relief.  Cannot take oral NSAIDs.  The following portions of the patient's history were reviewed and updated as appropriate: allergies, current medications, past family history, past medical history, past social history, past surgical history and problem list.    Past Medical History:   Diagnosis Date   • A-fib (CMS/HCC)    • Abnormality of lung    • Acquired insufficiency of aortic valve    • Antral gastritis    • Anxiety    • Asymptomatic postmenopausal status    •  Atrial fibrillation (CMS/HCC)    • Body mass index (BMI) 24.0-24.9, adult    • Chest pain, atypical    • COPD (chronic obstructive pulmonary disease) (CMS/HCC)    • Diverticulosis of colon    • ASHLEE (generalized anxiety disorder)    • GERD (gastroesophageal reflux disease)    • Heart disease    • Hiatal hernia    • High risk medication use    • Hyperactive bowel sounds    • Hyperlipidemia    • Hypertension    • Insomnia    • Intermittent claudication (CMS/HCC)    • Internal hemorrhoids    • Lower extremity edema    • Mitral insufficiency    • KOKO (obstructive sleep apnea)    • Osteopenia    • Pacemaker    • Pain of left patella    • Pedal edema    • Pulmonary hypertension (CMS/HCC)    • SSS (sick sinus syndrome) (CMS/HCC)    • Tricuspid regurgitation    • Urine frequency    • Uterovaginal prolapse    • Wheezing        Past Surgical History:   Procedure Laterality Date   • COLONOSCOPY  01/19/2015    diverticuli and hemorrhoids   • COLONOSCOPY  02/03/2009    diverticuli   • PACEMAKER IMPLANTATION     • PACEMAKER IMPLANTATION      SERIAL # [id number], model #[id number]   • UPPER GASTROINTESTINAL ENDOSCOPY  02/03/2009    small hiatal hernia   • UPPER GASTROINTESTINAL ENDOSCOPY  01/19/2015    sliding small hiatal hernia       Family History   Problem Relation Age of Onset   • Cancer Mother         oral-mouth   • Coronary artery disease Mother    • Cancer Father         oral-mouth   • Cancer Brother         cholangiocarcinoma/ oral-mouth   • Leukemia Brother        Social History     Socioeconomic History   • Marital status:      Spouse name: Not on file   • Number of children: Not on file   • Years of education: Not on file   • Highest education level: Not on file   Tobacco Use   • Smoking status: Former Smoker   • Smokeless tobacco: Never Used   Substance and Sexual Activity   • Alcohol use: No     Frequency: Never   • Drug use: No   • Sexual activity: Defer       Current Outpatient Medications   Medication Sig  Dispense Refill   • ALPRAZolam (XANAX) 0.5 MG tablet Take 1 tablet by mouth 3 (Three) Times a Day As Needed for Anxiety. 90 tablet 1   • diclofenac (VOLTAREN) 1 % gel gel Apply 4 g topically to the appropriate area as directed 4 (Four) Times a Day As Needed (pain and arthralgia). 100 g 3   • digoxin (LANOXIN) 125 MCG tablet Take 1 tablet by mouth Daily. 90 tablet 1   • diltiaZEM CD (CARDIZEM CD) 180 MG 24 hr capsule Take 1 capsule by mouth Daily. 90 capsule 1   • esomeprazole (nexIUM) 40 MG capsule Take 1 capsule by mouth Daily. 90 capsule 1   • estradiol (ESTRACE) 0.1 MG/GM vaginal cream Insert 1 g into the vagina 2 (Two) Times a Week.     • furosemide (LASIX) 20 MG tablet Take 2 tablets by mouth Every Morning AND 1 tablet Every Evening. 270 tablet 1   • metoprolol succinate XL (TOPROL-XL) 50 MG 24 hr tablet Take 1 tablet by mouth Daily. 90 tablet 3   • pravastatin (PRAVACHOL) 20 MG tablet      • PROBIOTIC PRODUCT PO Take  by mouth.     • warfarin (COUMADIN) 3 MG tablet Take 1 tablet by mouth Daily. 90 tablet 1   • olopatadine (PATANOL) 0.1 % ophthalmic solution Administer 1 drop to both eyes 2 (Two) Times a Day. 5 mL 1     Current Facility-Administered Medications   Medication Dose Route Frequency Provider Last Rate Last Dose   • Lidocaine (Anorectal) (LMX 5) 5 % cream cream   Topical TID PRN NicholeSerg MD           Review of Systems   Constitutional: Negative for activity change, appetite change, fatigue, fever, unexpected weight gain and unexpected weight loss.   HENT: Negative for nosebleeds, rhinorrhea, trouble swallowing and voice change.    Eyes: Positive for discharge and redness. Negative for visual disturbance.   Respiratory: Negative for cough, chest tightness, shortness of breath and wheezing.    Cardiovascular: Negative for chest pain, palpitations and leg swelling.   Gastrointestinal: Negative for abdominal pain, blood in stool, constipation, diarrhea, nausea, vomiting, GERD and indigestion.    Genitourinary: Negative for dysuria, frequency and hematuria.   Musculoskeletal: Negative for arthralgias, back pain and myalgias.   Skin: Negative for rash and bruise.   Neurological: Negative for dizziness, tremors, weakness, light-headedness, numbness, headache and memory problem.   Hematological: Negative for adenopathy. Does not bruise/bleed easily.   Psychiatric/Behavioral: Negative for sleep disturbance and depressed mood. The patient is not nervous/anxious.        Objective   Vitals:    11/25/19 0935   BP: 122/60   Pulse: 87   Temp: 97.9 °F (36.6 °C)   SpO2: 96%     Body mass index is 24.07 kg/m².  Physical Exam   Constitutional: She is oriented to person, place, and time. She appears well-developed and well-nourished. No distress.   HENT:   Head: Normocephalic and atraumatic.   Right Ear: External ear normal.   Left Ear: External ear normal.   Nose: Nose normal.   Mouth/Throat: Oropharynx is clear and moist.   Eyes: Conjunctivae and EOM are normal. Pupils are equal, round, and reactive to light.   Neck: Normal range of motion. Neck supple. No tracheal deviation present. No thyromegaly present.   Cardiovascular: Normal rate, regular rhythm, normal heart sounds and intact distal pulses. Exam reveals no gallop and no friction rub.   No murmur heard.  Pulmonary/Chest: Effort normal and breath sounds normal. No respiratory distress.   Abdominal: Soft. Bowel sounds are normal. She exhibits no mass. There is no tenderness. There is no guarding.   Musculoskeletal: Normal range of motion. She exhibits no edema.   Lymphadenopathy:     She has no cervical adenopathy.   Neurological: She is alert and oriented to person, place, and time. She displays normal reflexes. She exhibits normal muscle tone.   Skin: Skin is warm and dry. Capillary refill takes less than 2 seconds. No rash noted. She is not diaphoretic.   Psychiatric: She has a normal mood and affect. Her behavior is normal. Judgment and thought content normal.    Nursing note and vitals reviewed.      Recent Results (from the past 2016 hour(s))   POC INR    Collection Time: 09/16/19 11:21 AM   Result Value Ref Range    INR 1.90 (A) 2 - 3   Comprehensive Metabolic Panel    Collection Time: 09/16/19 11:33 AM   Result Value Ref Range    Glucose 104 (H) 65 - 99 mg/dL    BUN 9 8 - 23 mg/dL    Creatinine 0.74 0.57 - 1.00 mg/dL    eGFR Non African Am 76 >60 mL/min/1.73    eGFR African Am 92 >60 mL/min/1.73    BUN/Creatinine Ratio 12.2 7.0 - 25.0    Sodium 144 136 - 145 mmol/L    Potassium 3.8 3.5 - 5.2 mmol/L    Chloride 101 98 - 107 mmol/L    Total CO2 26.8 22.0 - 29.0 mmol/L    Calcium 9.4 8.6 - 10.5 mg/dL    Total Protein 7.5 6.0 - 8.5 g/dL    Albumin 4.60 3.50 - 5.20 g/dL    Globulin 2.9 gm/dL    A/G Ratio 1.6 g/dL    Total Bilirubin 0.4 0.2 - 1.2 mg/dL    Alkaline Phosphatase 80 39 - 117 U/L    AST (SGOT) 17 1 - 32 U/L    ALT (SGPT) 12 1 - 33 U/L   Lipid Panel    Collection Time: 09/16/19 11:33 AM   Result Value Ref Range    Total Cholesterol 173 0 - 200 mg/dL    Triglycerides 145 0 - 150 mg/dL    HDL Cholesterol 47 40 - 60 mg/dL    VLDL Cholesterol 29 mg/dL    LDL Cholesterol  97 0 - 100 mg/dL   CBC & Differential    Collection Time: 09/16/19 11:33 AM   Result Value Ref Range    WBC 9.23 3.40 - 10.80 10*3/mm3    RBC 4.56 3.77 - 5.28 10*6/mm3    Hemoglobin 10.9 (L) 12.0 - 15.9 g/dL    Hematocrit 37.4 34.0 - 46.6 %    MCV 82.0 79.0 - 97.0 fL    MCH 23.9 (L) 26.6 - 33.0 pg    MCHC 29.1 (L) 31.5 - 35.7 g/dL    RDW 17.4 (H) 12.3 - 15.4 %    Platelets 364 140 - 450 10*3/mm3    Neutrophil Rel % 62.2 42.7 - 76.0 %    Lymphocyte Rel % 28.2 19.6 - 45.3 %    Monocyte Rel % 7.5 5.0 - 12.0 %    Eosinophil Rel % 0.9 0.3 - 6.2 %    Basophil Rel % 1.0 0.0 - 1.5 %    Neutrophils Absolute 5.75 1.70 - 7.00 10*3/mm3    Lymphocytes Absolute 2.60 0.70 - 3.10 10*3/mm3    Monocytes Absolute 0.69 0.10 - 0.90 10*3/mm3    Eosinophils Absolute 0.08 0.00 - 0.40 10*3/mm3    Basophils Absolute 0.09 0.00  - 0.20 10*3/mm3    Immature Granulocyte Rel % 0.2 0.0 - 0.5 %    Immature Grans Absolute 0.02 0.00 - 0.05 10*3/mm3    nRBC 0.0 0.0 - 0.2 /100 WBC   POC INR    Collection Time: 10/24/19 10:42 AM   Result Value Ref Range    INR 1.90 (A) 2 - 3   POC INR    Collection Time: 11/25/19  9:43 AM   Result Value Ref Range    INR 2.50 2 - 3     Assessment/Plan   Stacey was seen today for atrial fibrillation.    Diagnoses and all orders for this visit:    Anticoagulated on warfarin    Atrial fibrillation, unspecified type (CMS/HCC)    Allergic conjunctivitis of both eyes  -     olopatadine (PATANOL) 0.1 % ophthalmic solution; Administer 1 drop to both eyes 2 (Two) Times a Day.    Screening mammogram, encounter for  -     Mammo Screening Bilateral With CAD    Neuropathic pain of right ankle  -     Lidocaine (Anorectal) (LMX 5) 5 % cream cream    Other orders  -     POC INR        Continue the current warfarin dosage unchanged.  Repeat level in 4-6 weeks.

## 2019-11-27 DIAGNOSIS — M25.571 ARTHRALGIA OF RIGHT ANKLE: Primary | ICD-10-CM

## 2019-11-27 DIAGNOSIS — M79.2 NEUROPATHIC PAIN OF RIGHT ANKLE: ICD-10-CM

## 2019-11-27 RX ORDER — LIDOCAINE 50 MG/G
OINTMENT TOPICAL 3 TIMES DAILY
Qty: 150 G | Refills: 2 | Status: SHIPPED | OUTPATIENT
Start: 2019-11-27 | End: 2021-06-17

## 2020-01-06 ENCOUNTER — OFFICE VISIT (OUTPATIENT)
Dept: FAMILY MEDICINE CLINIC | Facility: CLINIC | Age: 80
End: 2020-01-06

## 2020-01-06 VITALS
SYSTOLIC BLOOD PRESSURE: 128 MMHG | HEIGHT: 61 IN | OXYGEN SATURATION: 96 % | BODY MASS INDEX: 23.98 KG/M2 | TEMPERATURE: 98.3 F | HEART RATE: 84 BPM | WEIGHT: 127 LBS | DIASTOLIC BLOOD PRESSURE: 70 MMHG

## 2020-01-06 DIAGNOSIS — I48.91 ATRIAL FIBRILLATION, UNSPECIFIED TYPE (HCC): ICD-10-CM

## 2020-01-06 DIAGNOSIS — Z79.01 ANTICOAGULATED ON WARFARIN: Primary | ICD-10-CM

## 2020-01-06 DIAGNOSIS — F41.9 ANXIETY: ICD-10-CM

## 2020-01-06 DIAGNOSIS — I10 ESSENTIAL HYPERTENSION: ICD-10-CM

## 2020-01-06 DIAGNOSIS — K21.9 GASTROESOPHAGEAL REFLUX DISEASE WITHOUT ESOPHAGITIS: ICD-10-CM

## 2020-01-06 LAB — INR PPP: 2.2 (ref 2–3)

## 2020-01-06 PROCEDURE — 36416 COLLJ CAPILLARY BLOOD SPEC: CPT | Performed by: INTERNAL MEDICINE

## 2020-01-06 PROCEDURE — 99214 OFFICE O/P EST MOD 30 MIN: CPT | Performed by: INTERNAL MEDICINE

## 2020-01-06 PROCEDURE — 85610 PROTHROMBIN TIME: CPT | Performed by: INTERNAL MEDICINE

## 2020-01-06 RX ORDER — DILTIAZEM HYDROCHLORIDE 180 MG/1
180 CAPSULE, COATED, EXTENDED RELEASE ORAL DAILY
Qty: 90 CAPSULE | Refills: 1 | Status: SHIPPED | OUTPATIENT
Start: 2020-01-06 | End: 2020-06-18 | Stop reason: SDUPTHER

## 2020-01-06 RX ORDER — FUROSEMIDE 20 MG/1
TABLET ORAL
Qty: 270 TABLET | Refills: 1 | Status: SHIPPED | OUTPATIENT
Start: 2020-01-06 | End: 2020-08-10

## 2020-01-06 RX ORDER — ALPRAZOLAM 0.5 MG/1
0.5 TABLET ORAL 3 TIMES DAILY PRN
Qty: 90 TABLET | Refills: 1 | Status: SHIPPED | OUTPATIENT
Start: 2020-01-06 | End: 2020-06-18 | Stop reason: SDUPTHER

## 2020-01-06 RX ORDER — ESOMEPRAZOLE MAGNESIUM 40 MG/1
40 CAPSULE, DELAYED RELEASE ORAL DAILY
Qty: 90 CAPSULE | Refills: 1 | Status: SHIPPED | OUTPATIENT
Start: 2020-01-06 | End: 2020-06-18 | Stop reason: SDUPTHER

## 2020-01-06 NOTE — PROGRESS NOTES
Subjective   Stacey Rojo is a 79 y.o. female.     Chief Complaint   Patient presents with   • Anticoagulation       History of Present Illness   Daughter was present during the history-taking and subsequent discussion (and for part of the physical exam) with this patient.  Patient agrees to the presence of the individual during this visit.    Here for follow up INR evaluation.  Currently anticoagulated with warfarin for atrial fibrillation.  The patient is taking warfarin 3mg T, Th, S, S and 1.5 mg M,W,F.  The current INR goal is 2-3.  The INR is currently 1.9.  No significant interval events such as easy bleeding, bruising, fever, weakness or numbness.  She has had several episodes (4) of significant nosebleed 2 months ago and was cauterized but none since.  Is off the CPAP for now and being reevaluated.   Follow-up for cholesterol.  Currently has been feeling well without any myalgias, muscle aches, weakness, numbness, chest pain, short of breath or other issues.  Currently is adherent with medication regimen pf pravastatin 20 mg a day and denies medication side effects. Labs done 9/16/19.  Follow-up for hypertension.  Currently has been feeling well and asymptomatic without any headaches, vision changes, cough, chest pain, shortness of breath, swelling, focal neurologic deficit, memory loss or syncope.  Has been taking the medications regularly and adherent with the regimen of diltiazem CD 180mg daily, lasix 20 mg BID and metoprolol XL 50 mg 3 tabs daily. Denies medication side effects and no significant interval events. Labs done 9/16/19.    The following portions of the patient's history were reviewed and updated as appropriate: allergies, current medications, past family history, past medical history, past social history, past surgical history and problem list.    Depression Screen:  PHQ-2/PHQ-9 Depression Screening 9/16/2019   Little interest or pleasure in doing things 0   Feeling down, depressed, or  hopeless 0   Total Score 0       Past Medical History:   Diagnosis Date   • A-fib (CMS/[facility])    • Abnormality of lung    • Acquired insufficiency of aortic valve    • Antral gastritis    • Anxiety    • Asymptomatic postmenopausal status    • Atrial fibrillation (CMS/[facility])    • Body mass index (BMI) 24.0-24.9, adult    • Chest pain, atypical    • COPD (chronic obstructive pulmonary disease) (CMS/[facility])    • Diverticulosis of colon    • ASHLEE (generalized anxiety disorder)    • GERD (gastroesophageal reflux disease)    • Heart disease    • Hiatal hernia    • High risk medication use    • Hyperactive bowel sounds    • Hyperlipidemia    • Hypertension    • Insomnia    • Intermittent claudication (CMS/[facility])    • Internal hemorrhoids    • Lower extremity edema    • Mitral insufficiency    • KOKO (obstructive sleep apnea)    • Osteopenia    • Pacemaker    • Pain of left patella    • Pedal edema    • Pulmonary hypertension (CMS/[facility])    • SSS (sick sinus syndrome) (CMS/[facility])    • Tricuspid regurgitation    • Urine frequency    • Uterovaginal prolapse    • Wheezing        Past Surgical History:   Procedure Laterality Date   • COLONOSCOPY  01/19/2015    diverticuli and hemorrhoids   • COLONOSCOPY  02/03/2009    diverticuli   • PACEMAKER IMPLANTATION     • PACEMAKER IMPLANTATION      SERIAL # [id number], model #W1DR01   • UPPER GASTROINTESTINAL ENDOSCOPY  02/03/2009    small hiatal hernia   • UPPER GASTROINTESTINAL ENDOSCOPY  01/19/2015    sliding small hiatal hernia       Family History   Problem Relation Age of Onset   • Cancer Mother         oral-mouth   • Coronary artery disease Mother    • Cancer Father         oral-mouth   • Cancer Brother         cholangiocarcinoma/ oral-mouth   • Leukemia Brother        Social History     Socioeconomic History   • Marital status:      Spouse name: Not on file   • Number of children: Not on file   • Years of education: Not on file   • Highest education level: Not on file   Tobacco Use   •  Smoking status: Former Smoker   • Smokeless tobacco: Never Used   Substance and Sexual Activity   • Alcohol use: No     Frequency: Never   • Drug use: No   • Sexual activity: Defer       Current Outpatient Medications   Medication Sig Dispense Refill   • ALPRAZolam (XANAX) 0.5 MG tablet Take 1 tablet by mouth 3 (Three) Times a Day As Needed for Anxiety. 90 tablet 1   • diclofenac (VOLTAREN) 1 % gel gel Apply 4 g topically to the appropriate area as directed 4 (Four) Times a Day As Needed (pain and arthralgia). 100 g 3   • digoxin (LANOXIN) 125 MCG tablet Take 1 tablet by mouth Daily. 90 tablet 1   • dilTIAZem CD (CARDIZEM CD) 180 MG 24 hr capsule Take 1 capsule by mouth Daily. 90 capsule 1   • esomeprazole (nexIUM) 40 MG capsule Take 1 capsule by mouth Daily. 90 capsule 1   • estradiol (ESTRACE) 0.1 MG/GM vaginal cream Insert 1 g into the vagina 2 (Two) Times a Week.     • furosemide (LASIX) 20 MG tablet Take 2 tablets by mouth Every Morning AND 1 tablet Every Evening. 270 tablet 1   • lidocaine (XYLOCAINE) 5 % ointment Apply  topically to the appropriate area as directed 3 (Three) Times a Day. 150 g 2   • metoprolol succinate XL (TOPROL-XL) 50 MG 24 hr tablet Take 1 tablet by mouth Daily. 90 tablet 3   • olopatadine (PATANOL) 0.1 % ophthalmic solution Administer 1 drop to both eyes 2 (Two) Times a Day. 5 mL 1   • pravastatin (PRAVACHOL) 20 MG tablet      • PROBIOTIC PRODUCT PO Take  by mouth.     • warfarin (COUMADIN) 3 MG tablet Take 1 tablet by mouth Daily. 90 tablet 1     No current facility-administered medications for this visit.        Review of Systems   Constitutional: Negative for activity change, appetite change, fatigue, fever, unexpected weight gain and unexpected weight loss.   HENT: Negative for nosebleeds, rhinorrhea, trouble swallowing and voice change.    Eyes: Negative for visual disturbance.   Respiratory: Negative for cough, chest tightness, shortness of breath and wheezing.    Cardiovascular:  "Negative for chest pain, palpitations and leg swelling.   Gastrointestinal: Negative for abdominal pain, blood in stool, constipation, diarrhea, nausea, vomiting, GERD and indigestion.   Genitourinary: Negative for dysuria, frequency and hematuria.   Musculoskeletal: Negative for arthralgias, back pain and myalgias.   Skin: Negative for rash and bruise.   Neurological: Negative for dizziness, tremors, weakness, light-headedness, numbness, headache and memory problem.   Hematological: Negative for adenopathy. Does not bruise/bleed easily.   Psychiatric/Behavioral: Negative for sleep disturbance and depressed mood. The patient is not nervous/anxious.        Objective   /70 (BP Location: Left arm, Patient Position: Sitting, Cuff Size: Adult)   Pulse 84   Temp 98.3 °F (36.8 °C)   Ht 154.9 cm (60.98\")   Wt 57.6 kg (127 lb)   SpO2 96%   BMI 24.01 kg/m²     Physical Exam   Constitutional: She is oriented to person, place, and time. She appears well-developed and well-nourished. No distress.   HENT:   Head: Normocephalic and atraumatic.   Right Ear: External ear normal.   Left Ear: External ear normal.   Nose: Nose normal.   Mouth/Throat: Oropharynx is clear and moist.   Eyes: Pupils are equal, round, and reactive to light. Conjunctivae and EOM are normal.   Neck: Normal range of motion. Neck supple. No tracheal deviation present. No thyromegaly present.   Cardiovascular: Normal rate, regular rhythm, normal heart sounds and intact distal pulses. Exam reveals no gallop and no friction rub.   No murmur heard.  Pulmonary/Chest: Effort normal and breath sounds normal. No respiratory distress.   Abdominal: Soft. Bowel sounds are normal. She exhibits no mass. There is no tenderness. There is no guarding.   Musculoskeletal: Normal range of motion. She exhibits no edema.   Lymphadenopathy:     She has no cervical adenopathy.   Neurological: She is alert and oriented to person, place, and time. She displays normal " reflexes. She exhibits normal muscle tone.   Skin: Skin is warm and dry. Capillary refill takes less than 2 seconds. No rash noted. She is not diaphoretic.   Psychiatric: She has a normal mood and affect. Her behavior is normal. Judgment and thought content normal.   Nursing note and vitals reviewed.      Recent Results (from the past 2016 hour(s))   POC INR    Collection Time: 10/24/19 10:42 AM   Result Value Ref Range    INR 1.90 (A) 2 - 3   POC INR    Collection Time: 11/25/19  9:43 AM   Result Value Ref Range    INR 2.50 2 - 3   POC INR    Collection Time: 01/06/20  9:30 AM   Result Value Ref Range    INR 2.20 (A) 2 - 3     Assessment/Plan   Stacey was seen today for anticoagulation.    Diagnoses and all orders for this visit:    Anticoagulated on warfarin    Atrial fibrillation, unspecified type (CMS/HCC)  -     dilTIAZem CD (CARDIZEM CD) 180 MG 24 hr capsule; Take 1 capsule by mouth Daily.    Essential hypertension  -     dilTIAZem CD (CARDIZEM CD) 180 MG 24 hr capsule; Take 1 capsule by mouth Daily.  -     furosemide (LASIX) 20 MG tablet; Take 2 tablets by mouth Every Morning AND 1 tablet Every Evening.    Gastroesophageal reflux disease without esophagitis  -     esomeprazole (nexIUM) 40 MG capsule; Take 1 capsule by mouth Daily.    Anxiety  -     ALPRAZolam (XANAX) 0.5 MG tablet; Take 1 tablet by mouth 3 (Three) Times a Day As Needed for Anxiety.    Other orders  -     POC INR    Continue the current warfarin regimen and repeat in 4-6 weeks.  No change in medications. BP is controlled at this time and asymptomatic with the DANIELLE NIETO run and reviewed.  Risks of the medication include but are not limited to fatigue, somnolence, increased risk of falls, allergic reaction, dependence, and addiction.

## 2020-02-06 ENCOUNTER — OFFICE VISIT (OUTPATIENT)
Dept: FAMILY MEDICINE CLINIC | Facility: CLINIC | Age: 80
End: 2020-02-06

## 2020-02-06 VITALS
OXYGEN SATURATION: 94 % | HEART RATE: 70 BPM | DIASTOLIC BLOOD PRESSURE: 70 MMHG | WEIGHT: 127 LBS | SYSTOLIC BLOOD PRESSURE: 150 MMHG | BODY MASS INDEX: 23.98 KG/M2 | HEIGHT: 61 IN | TEMPERATURE: 97.5 F

## 2020-02-06 DIAGNOSIS — I48.91 ATRIAL FIBRILLATION, UNSPECIFIED TYPE (HCC): ICD-10-CM

## 2020-02-06 DIAGNOSIS — J06.9 VIRAL UPPER RESPIRATORY TRACT INFECTION: ICD-10-CM

## 2020-02-06 DIAGNOSIS — J30.9 ALLERGIC RHINITIS, UNSPECIFIED SEASONALITY, UNSPECIFIED TRIGGER: ICD-10-CM

## 2020-02-06 DIAGNOSIS — I10 ESSENTIAL HYPERTENSION: ICD-10-CM

## 2020-02-06 DIAGNOSIS — Z79.01 ANTICOAGULATED ON WARFARIN: Primary | ICD-10-CM

## 2020-02-06 LAB — INR PPP: 2.2 (ref 2–3)

## 2020-02-06 PROCEDURE — 85610 PROTHROMBIN TIME: CPT | Performed by: INTERNAL MEDICINE

## 2020-02-06 PROCEDURE — 99214 OFFICE O/P EST MOD 30 MIN: CPT | Performed by: INTERNAL MEDICINE

## 2020-02-06 RX ORDER — AZELASTINE HCL 205.5 UG/1
2 SPRAY NASAL DAILY
Qty: 1 EACH | Refills: 3 | Status: SHIPPED | OUTPATIENT
Start: 2020-02-06 | End: 2022-11-17

## 2020-02-06 NOTE — PROGRESS NOTES
Subjective   Stacey Rojo is a 79 y.o. female.     Chief Complaint   Patient presents with   • Cough   • Anticoagulation       History of Present Illness   Here for follow up INR evaluation.  Currently anticoagulated with warfarin for atrial fibrillation.  The patient is taking warfarin 3mg T, Th, S, S and 1.5 mg M,W,F.  The current INR goal is 2-3.  The INR is currently 1.9.  No significant interval events such as easy bleeding, bruising, fever, weakness or numbness.  She has had several episodes (4) of significant nosebleed several months ago and was cauterized but none since.  Is now off the CPAP for only mild KOKO and follow up in June with pulm.     Follow-up for cholesterol.  Currently has been feeling well without any myalgias, muscle aches, weakness, numbness, chest pain, short of breath or other issues.  Currently is adherent with medication regimen pf pravastatin 20 mg a day and denies medication side effects. Labs done 9/16/19.    Follow-up for hypertension.  Currently has been feeling well and asymptomatic without any headaches, vision changes, cough, chest pain, shortness of breath, swelling, focal neurologic deficit, memory loss or syncope.  Has been taking the medications regularly and adherent with the regimen of diltiazem CD 180mg daily, lasix 20 mg BID and metoprolol XL 50 mg 3 tabs daily. Denies medication side effects and no significant interval events. Labs done 9/16/19.    Has had 5 days of cough that is productive in the morning only with no blood.  Some clear runny nose and watery eyes.    The following portions of the patient's history were reviewed and updated as appropriate: allergies, current medications, past family history, past medical history, past social history, past surgical history and problem list.    Depression Screen:  PHQ-2/PHQ-9 Depression Screening 9/16/2019   Little interest or pleasure in doing things 0   Feeling down, depressed, or hopeless 0   Total Score 0       Past  Medical History:   Diagnosis Date   • A-fib (CMS/[facility])    • Abnormality of lung    • Acquired insufficiency of aortic valve    • Antral gastritis    • Anxiety    • Asymptomatic postmenopausal status    • Atrial fibrillation (CMS/[facility])    • Body mass index (BMI) 24.0-24.9, adult    • Chest pain, atypical    • COPD (chronic obstructive pulmonary disease) (CMS/[facility])    • Diverticulosis of colon    • ASHLEE (generalized anxiety disorder)    • GERD (gastroesophageal reflux disease)    • Heart disease    • Hiatal hernia    • High risk medication use    • Hyperactive bowel sounds    • Hyperlipidemia    • Hypertension    • Insomnia    • Intermittent claudication (CMS/[facility])    • Internal hemorrhoids    • Lower extremity edema    • Mitral insufficiency    • KOKO (obstructive sleep apnea)    • Osteopenia    • Pacemaker    • Pain of left patella    • Pedal edema    • Pulmonary hypertension (CMS/[facility])    • SSS (sick sinus syndrome) (CMS/[facility])    • Tricuspid regurgitation    • Urine frequency    • Uterovaginal prolapse    • Wheezing        Past Surgical History:   Procedure Laterality Date   • COLONOSCOPY  01/19/2015    diverticuli and hemorrhoids   • COLONOSCOPY  02/03/2009    diverticuli   • PACEMAKER IMPLANTATION     • PACEMAKER IMPLANTATION      SERIAL # [id number], model #[id number]   • UPPER GASTROINTESTINAL ENDOSCOPY  02/03/2009    small hiatal hernia   • UPPER GASTROINTESTINAL ENDOSCOPY  01/19/2015    sliding small hiatal hernia       Family History   Problem Relation Age of Onset   • Cancer Mother         oral-mouth   • Coronary artery disease Mother    • Cancer Father         oral-mouth   • Cancer Brother         cholangiocarcinoma/ oral-mouth   • Leukemia Brother        Social History     Socioeconomic History   • Marital status:      Spouse name: Not on file   • Number of children: Not on file   • Years of education: Not on file   • Highest education level: Not on file   Tobacco Use   • Smoking status: Former Smoker   •  Smokeless tobacco: Never Used   Substance and Sexual Activity   • Alcohol use: No     Frequency: Never   • Drug use: No   • Sexual activity: Defer       Current Outpatient Medications   Medication Sig Dispense Refill   • ALPRAZolam (XANAX) 0.5 MG tablet Take 1 tablet by mouth 3 (Three) Times a Day As Needed for Anxiety. 90 tablet 1   • diclofenac (VOLTAREN) 1 % gel gel Apply 4 g topically to the appropriate area as directed 4 (Four) Times a Day As Needed (pain and arthralgia). 100 g 3   • digoxin (LANOXIN) 125 MCG tablet Take 1 tablet by mouth Daily. 90 tablet 1   • dilTIAZem CD (CARDIZEM CD) 180 MG 24 hr capsule Take 1 capsule by mouth Daily. 90 capsule 1   • esomeprazole (nexIUM) 40 MG capsule Take 1 capsule by mouth Daily. 90 capsule 1   • estradiol (ESTRACE) 0.1 MG/GM vaginal cream Insert 1 g into the vagina 2 (Two) Times a Week.     • furosemide (LASIX) 20 MG tablet Take 2 tablets by mouth Every Morning AND 1 tablet Every Evening. 270 tablet 1   • lidocaine (XYLOCAINE) 5 % ointment Apply  topically to the appropriate area as directed 3 (Three) Times a Day. 150 g 2   • metoprolol succinate XL (TOPROL-XL) 50 MG 24 hr tablet Take 1 tablet by mouth Daily. 90 tablet 3   • pravastatin (PRAVACHOL) 20 MG tablet      • PROBIOTIC PRODUCT PO Take  by mouth.     • warfarin (COUMADIN) 3 MG tablet Take 1 tablet by mouth Daily. 90 tablet 1   • azelastine (ASTEPRO) 0.15 % solution nasal spray 2 sprays into the nostril(s) as directed by provider Daily. 1 each 3   • olopatadine (PATANOL) 0.1 % ophthalmic solution Administer 1 drop to both eyes 2 (Two) Times a Day. 5 mL 1     No current facility-administered medications for this visit.        Review of Systems   Constitutional: Negative for activity change, appetite change, fatigue, fever, unexpected weight gain and unexpected weight loss.   HENT: Positive for congestion, postnasal drip and rhinorrhea. Negative for nosebleeds, sinus pressure, trouble swallowing and voice change.   "  Eyes: Negative for visual disturbance.   Respiratory: Positive for cough. Negative for chest tightness, shortness of breath and wheezing.    Cardiovascular: Negative for chest pain, palpitations and leg swelling.   Gastrointestinal: Negative for abdominal pain, blood in stool, constipation, diarrhea, nausea, vomiting, GERD and indigestion.   Genitourinary: Negative for dysuria, frequency and hematuria.   Musculoskeletal: Negative for arthralgias, back pain and myalgias.   Skin: Negative for rash and bruise.   Neurological: Negative for dizziness, tremors, weakness, light-headedness, numbness, headache and memory problem.   Hematological: Negative for adenopathy. Does not bruise/bleed easily.   Psychiatric/Behavioral: Negative for sleep disturbance and depressed mood. The patient is not nervous/anxious.        Objective   /70 (BP Location: Right arm, Patient Position: Sitting, Cuff Size: Adult)   Pulse 70   Temp 97.5 °F (36.4 °C) (Temporal)   Ht 154.9 cm (60.98\")   Wt 57.6 kg (127 lb)   SpO2 94%   BMI 24.01 kg/m²     Physical Exam   Constitutional: She is oriented to person, place, and time. She appears well-developed and well-nourished. No distress.   HENT:   Head: Normocephalic and atraumatic.   Right Ear: External ear normal.   Left Ear: External ear normal.   Nose: Nose normal.   Mouth/Throat: Oropharynx is clear and moist.   Clear nasal drainage only.   Eyes: Pupils are equal, round, and reactive to light. Conjunctivae and EOM are normal.   Neck: Normal range of motion. Neck supple. No tracheal deviation present. No thyromegaly present.   Cardiovascular: Normal rate, normal heart sounds and intact distal pulses. An irregularly irregular rhythm present. Exam reveals no gallop and no friction rub.   No murmur heard.  Pulmonary/Chest: Effort normal and breath sounds normal. No respiratory distress. She has no wheezes.   Abdominal: Soft. Bowel sounds are normal. She exhibits no mass. There is no " tenderness. There is no guarding.   Musculoskeletal: Normal range of motion. She exhibits no edema.   Lymphadenopathy:     She has no cervical adenopathy.   Neurological: She is alert and oriented to person, place, and time. She displays normal reflexes. She exhibits normal muscle tone.   Skin: Skin is warm and dry. Capillary refill takes less than 2 seconds. No rash noted. She is not diaphoretic.   Psychiatric: She has a normal mood and affect. Her behavior is normal. Judgment and thought content normal.   Nursing note and vitals reviewed.      Recent Results (from the past 2016 hour(s))   POC INR    Collection Time: 11/25/19  9:43 AM   Result Value Ref Range    INR 2.50 2 - 3   POC INR    Collection Time: 01/06/20  9:30 AM   Result Value Ref Range    INR 2.20 (A) 2 - 3   POC INR    Collection Time: 02/06/20  9:23 AM   Result Value Ref Range    INR 2.20 (A) 2 - 3     Assessment/Plan   Stacey was seen today for cough and anticoagulation.    Diagnoses and all orders for this visit:    Anticoagulated on warfarin    Atrial fibrillation, unspecified type (CMS/HCC)    Essential hypertension    Viral upper respiratory tract infection    Allergic rhinitis, unspecified seasonality, unspecified trigger    Other orders  -     POC INR  -     azelastine (ASTEPRO) 0.15 % solution nasal spray; 2 sprays into the nostril(s) as directed by provider Daily.    Continue the current warfarin dosage unchanged.  Given azelastine nasal spray for the allergies and congestion.  Follow up INR in 4-6 weeks or earlier if problems.  Discussed the elevated blood pressure but may have been some anxiety [place].  If still elevated on follow up then consider increasing the diltiazem CD.

## 2020-04-13 DIAGNOSIS — Z79.01 ANTICOAGULATED ON WARFARIN: ICD-10-CM

## 2020-04-13 DIAGNOSIS — I48.91 ATRIAL FIBRILLATION, UNSPECIFIED TYPE (HCC): Primary | ICD-10-CM

## 2020-04-14 ENCOUNTER — TELEPHONE (OUTPATIENT)
Dept: FAMILY MEDICINE CLINIC | Facility: CLINIC | Age: 80
End: 2020-04-14

## 2020-04-14 NOTE — TELEPHONE ENCOUNTER
Called patient to let her know she is due to have her INR checked that orders have been put in for her

## 2020-04-15 DIAGNOSIS — I48.20 CHRONIC ATRIAL FIBRILLATION (HCC): ICD-10-CM

## 2020-04-15 DIAGNOSIS — Z79.01 ANTICOAGULATED ON WARFARIN: ICD-10-CM

## 2020-04-15 RX ORDER — WARFARIN SODIUM 3 MG/1
3 TABLET ORAL DAILY
Qty: 90 TABLET | Refills: 1 | Status: SHIPPED | OUTPATIENT
Start: 2020-04-15 | End: 2020-10-12

## 2020-04-15 NOTE — TELEPHONE ENCOUNTER
PTS GRANDCHILD CALLED REQUESTING A REFILL warfarin (COUMADIN) 3 MG tablet    PTS GRANDCHILD DOES NOT WANT PT TO COME INTO OFFICE TO DO INR     ANGEL CONFIRMED     CALL BACK   [phone]

## 2020-04-18 ENCOUNTER — RESULTS ENCOUNTER (OUTPATIENT)
Dept: FAMILY MEDICINE CLINIC | Facility: CLINIC | Age: 80
End: 2020-04-18

## 2020-04-18 DIAGNOSIS — I48.91 ATRIAL FIBRILLATION, UNSPECIFIED TYPE (HCC): ICD-10-CM

## 2020-04-18 DIAGNOSIS — Z79.01 ANTICOAGULATED ON WARFARIN: ICD-10-CM

## 2020-05-06 RX ORDER — DIGOXIN 125 MCG
125 TABLET ORAL DAILY
Qty: 90 TABLET | Refills: 1 | Status: SHIPPED | OUTPATIENT
Start: 2020-05-06 | End: 2020-10-28 | Stop reason: SDUPTHER

## 2020-05-14 DIAGNOSIS — I48.91 ATRIAL FIBRILLATION, UNSPECIFIED TYPE (HCC): Primary | ICD-10-CM

## 2020-05-14 DIAGNOSIS — Z79.01 ANTICOAGULATED ON WARFARIN: ICD-10-CM

## 2020-05-15 ENCOUNTER — RESULTS ENCOUNTER (OUTPATIENT)
Dept: FAMILY MEDICINE CLINIC | Facility: CLINIC | Age: 80
End: 2020-05-15

## 2020-05-15 DIAGNOSIS — Z79.01 ANTICOAGULATED ON WARFARIN: ICD-10-CM

## 2020-05-15 DIAGNOSIS — I48.91 ATRIAL FIBRILLATION, UNSPECIFIED TYPE (HCC): ICD-10-CM

## 2020-05-18 ENCOUNTER — TELEMEDICINE (OUTPATIENT)
Dept: FAMILY MEDICINE CLINIC | Facility: CLINIC | Age: 80
End: 2020-05-18

## 2020-05-18 DIAGNOSIS — Z79.01 ANTICOAGULATED ON WARFARIN: ICD-10-CM

## 2020-05-18 DIAGNOSIS — I10 ESSENTIAL HYPERTENSION: ICD-10-CM

## 2020-05-18 DIAGNOSIS — I48.91 ATRIAL FIBRILLATION, UNSPECIFIED TYPE (HCC): Primary | ICD-10-CM

## 2020-05-18 PROCEDURE — 99213 OFFICE O/P EST LOW 20 MIN: CPT | Performed by: INTERNAL MEDICINE

## 2020-05-18 NOTE — PROGRESS NOTES
Subjective   Stacey Rojo is a 79 y.o. female.     Chief Complaint   Patient presents with   • anticoagulation and INR       History of Present Illness   You have chosen to receive care through a telehealth visit.  Do you consent to use a video/audio connection for your medical care today? Yes  Video visit completed utilizing [employer].    Here for follow up INR evaluation.  Currently anticoagulated with warfarin for atrial fibrillation.  The patient is taking warfarin 3mg T, Th, S, S and 1.5 mg M,W,F.  The current INR goal is 2-3.  The last INR was 2/6/2020 of 2.2  No significant interval events such as easy bleeding, bruising, fever, weakness or numbness.  Is now off the CPAP for only mild KOKO and follow up in June with pulm.      Follow-up for cholesterol.  Currently has been feeling well without any myalgias, muscle aches, weakness, numbness, chest pain, short of breath or other issues.  Currently is adherent with medication regimen pf pravastatin 20 mg a day and denies medication side effects. Labs done 9/16/19.     Follow-up for hypertension.  Currently has been feeling well and asymptomatic without any headaches, vision changes, cough, chest pain, shortness of breath, swelling, focal neurologic deficit, memory loss or syncope.  Has been taking the medications regularly and adherent with the regimen of diltiazem CD 180mg daily, lasix 20 mg BID and metoprolol XL 50 mg 3 tabs daily. Denies medication side effects and no significant interval events. Labs done 9/16/19.    The following portions of the patient's history were reviewed and updated as appropriate: allergies, current medications, past family history, past medical history, past social history, past surgical history and problem list.    Depression Screen:  PHQ-2/PHQ-9 Depression Screening 9/16/2019   Little interest or pleasure in doing things 0   Feeling down, depressed, or hopeless 0   Total Score 0       Past Medical History:   Diagnosis Date   •  A-fib (CMS/HCC)    • Abnormality of lung    • Acquired insufficiency of aortic valve    • Antral gastritis    • Anxiety    • Asymptomatic postmenopausal status    • Atrial fibrillation (CMS/HCC)    • Body mass index (BMI) 24.0-24.9, adult    • Chest pain, atypical    • COPD (chronic obstructive pulmonary disease) (CMS/HCC)    • Diverticulosis of colon    • ASHLEE (generalized anxiety disorder)    • GERD (gastroesophageal reflux disease)    • Heart disease    • Hiatal hernia    • High risk medication use    • Hyperactive bowel sounds    • Hyperlipidemia    • Hypertension    • Insomnia    • Intermittent claudication (CMS/HCC)    • Internal hemorrhoids    • Lower extremity edema    • Mitral insufficiency    • KOKO (obstructive sleep apnea)    • Osteopenia    • Pacemaker    • Pain of left patella    • Pedal edema    • Pulmonary hypertension (CMS/HCC)    • SSS (sick sinus syndrome) (CMS/HCC)    • Tricuspid regurgitation    • Urine frequency    • Uterovaginal prolapse    • Wheezing        Past Surgical History:   Procedure Laterality Date   • COLONOSCOPY  01/19/2015    diverticuli and hemorrhoids   • COLONOSCOPY  02/03/2009    diverticuli   • PACEMAKER IMPLANTATION     • PACEMAKER IMPLANTATION      SERIAL # [id number], model #W1DR01   • UPPER GASTROINTESTINAL ENDOSCOPY  02/03/2009    small hiatal hernia   • UPPER GASTROINTESTINAL ENDOSCOPY  01/19/2015    sliding small hiatal hernia       Family History   Problem Relation Age of Onset   • Cancer Mother         oral-mouth   • Coronary artery disease Mother    • Cancer Father         oral-mouth   • Cancer Brother         cholangiocarcinoma/ oral-mouth   • Leukemia Brother        Social History     Socioeconomic History   • Marital status:      Spouse name: Not on file   • Number of children: Not on file   • Years of education: Not on file   • Highest education level: Not on file   Tobacco Use   • Smoking status: Former Smoker   • Smokeless tobacco: Never Used   Substance  and Sexual Activity   • Alcohol use: No     Frequency: Never   • Drug use: No   • Sexual activity: Defer       Current Outpatient Medications   Medication Sig Dispense Refill   • ALPRAZolam (XANAX) 0.5 MG tablet Take 1 tablet by mouth 3 (Three) Times a Day As Needed for Anxiety. 90 tablet 1   • azelastine (ASTEPRO) 0.15 % solution nasal spray 2 sprays into the nostril(s) as directed by provider Daily. 1 each 3   • diclofenac (VOLTAREN) 1 % gel gel Apply 4 g topically to the appropriate area as directed 4 (Four) Times a Day As Needed (pain and arthralgia). 100 g 3   • digoxin (LANOXIN) 125 MCG tablet Take 1 tablet by mouth Daily. 90 tablet 1   • dilTIAZem CD (CARDIZEM CD) 180 MG 24 hr capsule Take 1 capsule by mouth Daily. 90 capsule 1   • esomeprazole (nexIUM) 40 MG capsule Take 1 capsule by mouth Daily. 90 capsule 1   • estradiol (ESTRACE) 0.1 MG/GM vaginal cream Insert 1 g into the vagina 2 (Two) Times a Week.     • furosemide (LASIX) 20 MG tablet Take 2 tablets by mouth Every Morning AND 1 tablet Every Evening. 270 tablet 1   • lidocaine (XYLOCAINE) 5 % ointment Apply  topically to the appropriate area as directed 3 (Three) Times a Day. 150 g 2   • metoprolol succinate XL (TOPROL-XL) 50 MG 24 hr tablet Take 1 tablet by mouth Daily. 90 tablet 3   • olopatadine (PATANOL) 0.1 % ophthalmic solution Administer 1 drop to both eyes 2 (Two) Times a Day. 5 mL 1   • pravastatin (PRAVACHOL) 20 MG tablet      • PROBIOTIC PRODUCT PO Take  by mouth.     • warfarin (COUMADIN) 3 MG tablet Take 1 tablet by mouth Daily. 90 tablet 1     No current facility-administered medications for this visit.        Review of Systems   Constitutional: Negative for activity change, appetite change, fatigue, fever, unexpected weight gain and unexpected weight loss.   HENT: Negative for nosebleeds, rhinorrhea, trouble swallowing and voice change.    Eyes: Negative for visual disturbance.   Respiratory: Negative for cough, chest tightness,  shortness of breath and wheezing.    Cardiovascular: Negative for chest pain, palpitations and leg swelling.   Gastrointestinal: Negative for abdominal pain, blood in stool, constipation, diarrhea, nausea, vomiting, GERD and indigestion.   Genitourinary: Negative for dysuria, frequency and hematuria.   Musculoskeletal: Negative for arthralgias, back pain and myalgias.   Skin: Negative for rash and bruise.   Neurological: Negative for dizziness, tremors, weakness, light-headedness, numbness, headache and memory problem.   Hematological: Negative for adenopathy. Does not bruise/bleed easily.   Psychiatric/Behavioral: Negative for sleep disturbance and depressed mood. The patient is not nervous/anxious.        Objective   There were no vitals taken for this visit.    Physical Exam   Constitutional: She appears well-nourished. No distress.   HENT:   Head: Normocephalic and atraumatic.   Pulmonary/Chest: Effort normal.  No respiratory distress.  Neurological: She is alert.   Skin: She is not diaphoretic.   Psychiatric: She has a normal mood and affect. Her behavior is normal. Thought content is normal. She does not express abnormal judgement.       No results found for this or any previous visit (from the past 2016 hour(s)).  Assessment/Plan   Stacey was seen today for anticoagulation and inr.    Diagnoses and all orders for this visit:    Atrial fibrillation, unspecified type (CMS/HCC)    Anticoagulated on warfarin    Essential hypertension    Patient is asymptomatic with her blood pressure and atrial fibrillation.  Continue with the current medications unchanged.  INR is due to be checked she did not have done since February secondary to fear over the coronavirus but she is willing to go to [facility] facility have her blood drawn today.  Orders entered will be released for her to have it done.      Video visit completed.  Time of visit in discussion and care of patient and one-on-one care not including charting time of  15 minutes.

## 2020-05-19 ENCOUNTER — ANTICOAGULATION VISIT (OUTPATIENT)
Dept: FAMILY MEDICINE CLINIC | Facility: CLINIC | Age: 80
End: 2020-05-19

## 2020-05-19 LAB
INR PPP: 2 (ref 0.8–1.2)
PROTHROMBIN TIME: 20.3 SEC (ref 9.1–12)

## 2020-06-18 ENCOUNTER — OFFICE VISIT (OUTPATIENT)
Dept: FAMILY MEDICINE CLINIC | Facility: CLINIC | Age: 80
End: 2020-06-18

## 2020-06-18 VITALS
OXYGEN SATURATION: 97 % | HEIGHT: 61 IN | TEMPERATURE: 98.2 F | SYSTOLIC BLOOD PRESSURE: 136 MMHG | BODY MASS INDEX: 24.17 KG/M2 | HEART RATE: 77 BPM | DIASTOLIC BLOOD PRESSURE: 74 MMHG | WEIGHT: 128 LBS

## 2020-06-18 DIAGNOSIS — E78.5 HYPERLIPIDEMIA, UNSPECIFIED HYPERLIPIDEMIA TYPE: ICD-10-CM

## 2020-06-18 DIAGNOSIS — I10 ESSENTIAL HYPERTENSION: ICD-10-CM

## 2020-06-18 DIAGNOSIS — F41.9 ANXIETY: ICD-10-CM

## 2020-06-18 DIAGNOSIS — Z79.01 ANTICOAGULATED ON WARFARIN: Primary | ICD-10-CM

## 2020-06-18 DIAGNOSIS — I48.91 ATRIAL FIBRILLATION, UNSPECIFIED TYPE (HCC): ICD-10-CM

## 2020-06-18 DIAGNOSIS — K21.9 GASTROESOPHAGEAL REFLUX DISEASE WITHOUT ESOPHAGITIS: ICD-10-CM

## 2020-06-18 DIAGNOSIS — H00.012 HORDEOLUM EXTERNUM OF RIGHT LOWER EYELID: ICD-10-CM

## 2020-06-18 LAB — INR PPP: 2.2 (ref 2–3)

## 2020-06-18 PROCEDURE — 85610 PROTHROMBIN TIME: CPT | Performed by: INTERNAL MEDICINE

## 2020-06-18 PROCEDURE — 36416 COLLJ CAPILLARY BLOOD SPEC: CPT | Performed by: INTERNAL MEDICINE

## 2020-06-18 PROCEDURE — 99214 OFFICE O/P EST MOD 30 MIN: CPT | Performed by: INTERNAL MEDICINE

## 2020-06-18 RX ORDER — DILTIAZEM HYDROCHLORIDE 180 MG/1
180 CAPSULE, COATED, EXTENDED RELEASE ORAL DAILY
Qty: 90 CAPSULE | Refills: 1 | Status: SHIPPED | OUTPATIENT
Start: 2020-06-18 | End: 2020-08-03 | Stop reason: SDUPTHER

## 2020-06-18 RX ORDER — PRAVASTATIN SODIUM 20 MG
20 TABLET ORAL DAILY
Qty: 90 TABLET | Refills: 1 | Status: SHIPPED | OUTPATIENT
Start: 2020-06-18 | End: 2020-12-14

## 2020-06-18 RX ORDER — ESOMEPRAZOLE MAGNESIUM 40 MG/1
40 CAPSULE, DELAYED RELEASE ORAL DAILY
Qty: 90 CAPSULE | Refills: 1 | Status: SHIPPED | OUTPATIENT
Start: 2020-06-18 | End: 2020-07-07 | Stop reason: SDUPTHER

## 2020-06-18 RX ORDER — ALPRAZOLAM 0.5 MG/1
0.5 TABLET ORAL 3 TIMES DAILY PRN
Qty: 90 TABLET | Refills: 1 | Status: SHIPPED | OUTPATIENT
Start: 2020-06-18 | End: 2020-09-24 | Stop reason: SDUPTHER

## 2020-06-18 NOTE — PROGRESS NOTES
Subjective   Stacey Rojo is a 80 y.o. female.     Chief Complaint   Patient presents with   • Anticoagulation   • Eye Problem     Right       History of Present Illness   Here for follow up INR evaluation.  Currently anticoagulated with warfarin for atrial fibrillation.  The patient is taking warfarin 3mg T, Th, S, S and 1.5 mg M,W,F.  The current INR goal is 2-3.  The last INR was 5/18/2020 of 2.0.  No significant interval events such as easy bleeding, bruising, fever, weakness or numbness.      Patient eye has had eye watering for swveral weeks.  Then last week started having redness and a bump on the lower eyelid with no drainage.  No new vision changes at this time.    Is now off the CPAP for only mild KOKO and follow up in June with pulm.      Follow-up for cholesterol.  Currently has been feeling well without any myalgias, muscle aches, weakness, numbness, chest pain, short of breath or other issues.  Currently is adherent with medication regimen pf pravastatin 20 mg a day and denies medication side effects. Labs done 9/16/19.     Follow-up for hypertension.  Currently has been feeling well and asymptomatic without any headaches, vision changes, cough, chest pain, shortness of breath, swelling, focal neurologic deficit, memory loss or syncope.  Has been taking the medications regularly and adherent with the regimen of diltiazem CD 180mg daily, lasix 20 mg BID and metoprolol XL 50 mg 3 tabs daily. Denies medication side effects and no significant interval events. Labs done 9/16/19.    Follow-up for anxiety.  Doing well currently and sleeping well with no recent panic attacks.  Using the alprazolam as needed but is usually taking only one a day and no noted side effects.    The following portions of the patient's history were reviewed and updated as appropriate: allergies, current medications, past family history, past medical history, past social history, past surgical history and problem list.    Depression  Screen:  PHQ-2/PHQ-9 Depression Screening 9/16/2019   Little interest or pleasure in doing things 0   Feeling down, depressed, or hopeless 0   Total Score 0       Past Medical History:   Diagnosis Date   • A-fib (CMS/HCC)    • Abnormality of lung    • Acquired insufficiency of aortic valve    • Antral gastritis    • Anxiety    • Asymptomatic postmenopausal status    • Atrial fibrillation (CMS/HCC)    • Body mass index (BMI) 24.0-24.9, adult    • Chest pain, atypical    • COPD (chronic obstructive pulmonary disease) (CMS/HCC)    • Diverticulosis of colon    • ASHLEE (generalized anxiety disorder)    • GERD (gastroesophageal reflux disease)    • Heart disease    • Hiatal hernia    • High risk medication use    • Hyperactive bowel sounds    • Hyperlipidemia    • Hypertension    • Insomnia    • Intermittent claudication (CMS/HCC)    • Internal hemorrhoids    • Lower extremity edema    • Mitral insufficiency    • KOKO (obstructive sleep apnea)    • Osteopenia    • Pacemaker    • Pain of left patella    • Pedal edema    • Pulmonary hypertension (CMS/HCC)    • SSS (sick sinus syndrome) (CMS/HCC)    • Tricuspid regurgitation    • Urine frequency    • Uterovaginal prolapse    • Wheezing        Past Surgical History:   Procedure Laterality Date   • COLONOSCOPY  01/19/2015    diverticuli and hemorrhoids   • COLONOSCOPY  02/03/2009    diverticuli   • PACEMAKER IMPLANTATION     • PACEMAKER IMPLANTATION      SERIAL # [id number], model #W1DR01   • UPPER GASTROINTESTINAL ENDOSCOPY  02/03/2009    small hiatal hernia   • UPPER GASTROINTESTINAL ENDOSCOPY  01/19/2015    sliding small hiatal hernia       Family History   Problem Relation Age of Onset   • Cancer Mother         oral-mouth   • Coronary artery disease Mother    • Cancer Father         oral-mouth   • Cancer Brother         cholangiocarcinoma/ oral-mouth   • Leukemia Brother        Social History     Socioeconomic History   • Marital status:      Spouse name: Not on file    • Number of children: Not on file   • Years of education: Not on file   • Highest education level: Not on file   Tobacco Use   • Smoking status: Former Smoker   • Smokeless tobacco: Never Used   Substance and Sexual Activity   • Alcohol use: No     Frequency: Never   • Drug use: No   • Sexual activity: Defer       Current Outpatient Medications   Medication Sig Dispense Refill   • ALPRAZolam (XANAX) 0.5 MG tablet Take 1 tablet by mouth 3 (Three) Times a Day As Needed for Anxiety. 90 tablet 1   • azelastine (ASTEPRO) 0.15 % solution nasal spray 2 sprays into the nostril(s) as directed by provider Daily. 1 each 3   • diclofenac (VOLTAREN) 1 % gel gel Apply 4 g topically to the appropriate area as directed 4 (Four) Times a Day As Needed (pain and arthralgia). 100 g 3   • digoxin (LANOXIN) 125 MCG tablet Take 1 tablet by mouth Daily. 90 tablet 1   • dilTIAZem CD (CARDIZEM CD) 180 MG 24 hr capsule Take 1 capsule by mouth Daily. 90 capsule 1   • esomeprazole (nexIUM) 40 MG capsule Take 1 capsule by mouth Daily. 90 capsule 1   • estradiol (ESTRACE) 0.1 MG/GM vaginal cream Insert 1 g into the vagina 2 (Two) Times a Week.     • furosemide (LASIX) 20 MG tablet Take 2 tablets by mouth Every Morning AND 1 tablet Every Evening. 270 tablet 1   • lidocaine (XYLOCAINE) 5 % ointment Apply  topically to the appropriate area as directed 3 (Three) Times a Day. 150 g 2   • metoprolol succinate XL (TOPROL-XL) 50 MG 24 hr tablet Take 1 tablet by mouth Daily. 90 tablet 3   • olopatadine (PATANOL) 0.1 % ophthalmic solution Administer 1 drop to both eyes 2 (Two) Times a Day. 5 mL 1   • pravastatin (PRAVACHOL) 20 MG tablet Take 1 tablet by mouth Daily. 90 tablet 1   • PROBIOTIC PRODUCT PO Take  by mouth.     • warfarin (COUMADIN) 3 MG tablet Take 1 tablet by mouth Daily. 90 tablet 1   • bacitracin-polymyxin b ointment ophthalmic ointment Administer  to the right eye Every 4 (Four) Hours. 3.5 g 0     No current facility-administered  "medications for this visit.        Review of Systems   Constitutional: Negative for activity change, appetite change, fatigue, fever, unexpected weight gain and unexpected weight loss.   HENT: Negative for nosebleeds, rhinorrhea, trouble swallowing and voice change.    Eyes: Negative for visual disturbance.   Respiratory: Negative for cough, chest tightness, shortness of breath and wheezing.    Cardiovascular: Negative for chest pain, palpitations and leg swelling.   Gastrointestinal: Negative for abdominal pain, blood in stool, constipation, diarrhea, nausea, vomiting, GERD and indigestion.   Genitourinary: Negative for dysuria, frequency and hematuria.   Musculoskeletal: Negative for arthralgias, back pain and myalgias.   Skin: Negative for rash and bruise.   Neurological: Negative for dizziness, tremors, weakness, light-headedness, numbness, headache and memory problem.   Hematological: Negative for adenopathy. Does not bruise/bleed easily.   Psychiatric/Behavioral: Negative for sleep disturbance and depressed mood. The patient is not nervous/anxious.        Objective   /74 (BP Location: Left arm, Patient Position: Sitting, Cuff Size: Adult)   Pulse 77   Temp 98.2 °F (36.8 °C) (Temporal)   Ht 154.9 cm (60.98\")   Wt 58.1 kg (128 lb)   SpO2 97%   BMI 24.20 kg/m²     Physical Exam   Constitutional: She is oriented to person, place, and time. She appears well-developed and well-nourished. No distress.   HENT:   Head: Normocephalic and atraumatic.   Right Ear: External ear normal.   Left Ear: External ear normal.   Nose: Nose normal.   Mouth/Throat: Oropharynx is clear and moist.   Eyes: Pupils are equal, round, and reactive to light. Conjunctivae and EOM are normal.   Right lower lid with mid stye that is red and non draining.   Neck: Normal range of motion. Neck supple. No tracheal deviation present. No thyromegaly present.   Cardiovascular: Normal rate, regular rhythm, normal heart sounds and intact " distal pulses. Exam reveals no gallop and no friction rub.   No murmur heard.  Pulmonary/Chest: Effort normal and breath sounds normal. No respiratory distress.   Abdominal: Soft. Bowel sounds are normal. She exhibits no mass. There is no tenderness. There is no guarding.   Musculoskeletal: Normal range of motion. She exhibits no edema.   Lymphadenopathy:     She has no cervical adenopathy.   Neurological: She is alert and oriented to person, place, and time. She displays normal reflexes. She exhibits normal muscle tone.   Skin: Skin is warm and dry. Capillary refill takes less than 2 seconds. No rash noted. She is not diaphoretic.   Psychiatric: She has a normal mood and affect. Her behavior is normal. Judgment and thought content normal.   Nursing note and vitals reviewed.      Recent Results (from the past 2016 hour(s))   Protime-INR    Collection Time: 05/18/20 11:18 AM   Result Value Ref Range    INR 2.0 (H) 0.8 - 1.2    Protime 20.3 (H) 9.1 - 12.0 sec   POC INR    Collection Time: 06/18/20  9:56 AM   Result Value Ref Range    INR 2.20 (A) 2 - 3     Assessment/Plan   Stacey was seen today for anticoagulation and eye problem.    Diagnoses and all orders for this visit:    Anticoagulated on warfarin    Atrial fibrillation, unspecified type (CMS/[facility])  -     dilTIAZem CD (CARDIZEM CD) 180 MG 24 hr capsule; Take 1 capsule by mouth Daily.    Hordeolum externum of right lower eyelid  -     bacitracin-polymyxin b ointment ophthalmic ointment; Administer  to the right eye Every 4 (Four) Hours.    Gastroesophageal reflux disease without esophagitis  -     esomeprazole (nexIUM) 40 MG capsule; Take 1 capsule by mouth Daily.    Essential hypertension  -     dilTIAZem CD (CARDIZEM CD) 180 MG 24 hr capsule; Take 1 capsule by mouth Daily.    Anxiety  -     ALPRAZolam (XANAX) 0.5 MG tablet; Take 1 tablet by mouth 3 (Three) Times a Day As Needed for Anxiety.    Hyperlipidemia, unspecified hyperlipidemia type  -     pravastatin  (PRAVACHOL) 20 MG tablet; Take 1 tablet by mouth Daily.    Other orders  -     POC INR    Chronic anticoagulation is appropriate for atrial fibrillation.  We will continue the current medication dosage of the warfarin unchanged and repeat in 4 to 6 weeks for her INR.  Right lower lid hordeolum will be treated utilizing the neomycin ophthalmic ointment as directed with warm compresses to the area.  GERD appears to be doing well as well as hypertension we will continue the current medications unchanged.  Patient's anxiety is stable utilizing the medicine mostly just once a day we will continue the medication as needed.  EMILIA run and reviewed.  Risks of the medication include but are not limited to fatigue, somnolence, increased risk of falls, allergic reaction, dependence, and addiction

## 2020-07-07 DIAGNOSIS — K21.9 GASTROESOPHAGEAL REFLUX DISEASE WITHOUT ESOPHAGITIS: ICD-10-CM

## 2020-07-08 RX ORDER — ESOMEPRAZOLE MAGNESIUM 40 MG/1
40 CAPSULE, DELAYED RELEASE ORAL DAILY
Qty: 90 CAPSULE | Refills: 1 | Status: SHIPPED | OUTPATIENT
Start: 2020-07-08 | End: 2020-09-24 | Stop reason: SDUPTHER

## 2020-07-16 ENCOUNTER — OFFICE VISIT (OUTPATIENT)
Dept: FAMILY MEDICINE CLINIC | Facility: CLINIC | Age: 80
End: 2020-07-16

## 2020-07-16 VITALS
TEMPERATURE: 98.5 F | OXYGEN SATURATION: 98 % | SYSTOLIC BLOOD PRESSURE: 132 MMHG | HEIGHT: 61 IN | DIASTOLIC BLOOD PRESSURE: 70 MMHG | HEART RATE: 66 BPM | BODY MASS INDEX: 21.9 KG/M2 | WEIGHT: 116 LBS

## 2020-07-16 DIAGNOSIS — Z79.01 ANTICOAGULATED ON WARFARIN: Primary | ICD-10-CM

## 2020-07-16 DIAGNOSIS — I10 ESSENTIAL HYPERTENSION: ICD-10-CM

## 2020-07-16 DIAGNOSIS — I48.91 ATRIAL FIBRILLATION, UNSPECIFIED TYPE (HCC): ICD-10-CM

## 2020-07-16 DIAGNOSIS — H02.9 EYELID ABNORMALITY: ICD-10-CM

## 2020-07-16 LAB — INR PPP: 2.1 (ref 2–3)

## 2020-07-16 PROCEDURE — 85610 PROTHROMBIN TIME: CPT | Performed by: INTERNAL MEDICINE

## 2020-07-16 PROCEDURE — 36416 COLLJ CAPILLARY BLOOD SPEC: CPT | Performed by: INTERNAL MEDICINE

## 2020-07-16 PROCEDURE — 99214 OFFICE O/P EST MOD 30 MIN: CPT | Performed by: INTERNAL MEDICINE

## 2020-07-16 NOTE — PROGRESS NOTES
Subjective   Stacey Rojo is a 80 y.o. female.     Chief Complaint   Patient presents with   • Anticoagulation       History of Present Illness   Here for follow up INR evaluation.  Currently anticoagulated with warfarin for atrial fibrillation.  The patient is taking warfarin 3mg T, Th, S, S and 1.5 mg M,W,F.  The current INR goal is 2-3.  The current INR is 2.1.  No significant interval events such as easy bleeding, bruising, fever, weakness or numbness.     Follow-up for cholesterol.  Currently has been feeling well without any myalgias, muscle aches, weakness, numbness, chest pain, short of breath or other issues.  Currently is adherent with medication regimen pf pravastatin 20 mg a day and denies medication side effects. Labs done 9/16/19.     Follow-up for hypertension.  Currently has been feeling well and asymptomatic without any headaches, vision changes, cough, chest pain, shortness of breath, swelling, focal neurologic deficit, memory loss or syncope.  Has been taking the medications regularly and adherent with the regimen of diltiazem CD 180mg daily, lasix 20 mg BID and metoprolol XL 50 mg 3 tabs daily. Denies medication side effects and no significant interval events. Labs done 9/16/19.     Follow-up for anxiety.  Doing well currently and sleeping well with no recent panic attacks.  Using the alprazolam as needed but is usually taking only one a day and no noted side effects.  Does not need refill at this time.    Still with right lower eyelid with redness and swelling on the inside that has been present for couple of months with eye watering.  No pain and no vision changes.    Is now off the CPAP for only mild KOKO and was to follow up in June with pulm but was cancelled due to the COVID19 and not rescheduled yet..     The following portions of the patient's history were reviewed and updated as appropriate: allergies, current medications, past family history, past medical history, past social history,  past surgical history and problem list.    Depression Screen:  PHQ-2/PHQ-9 Depression Screening 9/16/2019   Little interest or pleasure in doing things 0   Feeling down, depressed, or hopeless 0   Total Score 0       Past Medical History:   Diagnosis Date   • A-fib (CMS/HCC)    • Abnormality of lung    • Acquired insufficiency of aortic valve    • Antral gastritis    • Anxiety    • Asymptomatic postmenopausal status    • Atrial fibrillation (CMS/HCC)    • Body mass index (BMI) 24.0-24.9, adult    • Chest pain, atypical    • COPD (chronic obstructive pulmonary disease) (CMS/HCC)    • Diverticulosis of colon    • ASHLEE (generalized anxiety disorder)    • GERD (gastroesophageal reflux disease)    • Heart disease    • Hiatal hernia    • High risk medication use    • Hyperactive bowel sounds    • Hyperlipidemia    • Hypertension    • Insomnia    • Intermittent claudication (CMS/HCC)    • Internal hemorrhoids    • Lower extremity edema    • Mitral insufficiency    • KOKO (obstructive sleep apnea)    • Osteopenia    • Pacemaker    • Pain of left patella    • Pedal edema    • Pulmonary hypertension (CMS/HCC)    • SSS (sick sinus syndrome) (CMS/HCC)    • Tricuspid regurgitation    • Urine frequency    • Uterovaginal prolapse    • Wheezing        Past Surgical History:   Procedure Laterality Date   • COLONOSCOPY  01/19/2015    diverticuli and hemorrhoids   • COLONOSCOPY  02/03/2009    diverticuli   • PACEMAKER IMPLANTATION     • PACEMAKER IMPLANTATION      SERIAL # [id number], model #W1DR01   • UPPER GASTROINTESTINAL ENDOSCOPY  02/03/2009    small hiatal hernia   • UPPER GASTROINTESTINAL ENDOSCOPY  01/19/2015    sliding small hiatal hernia       Family History   Problem Relation Age of Onset   • Cancer Mother         oral-mouth   • Coronary artery disease Mother    • Cancer Father         oral-mouth   • Cancer Brother         cholangiocarcinoma/ oral-mouth   • Leukemia Brother        Social History     Socioeconomic History   •  Marital status:      Spouse name: Not on file   • Number of children: Not on file   • Years of education: Not on file   • Highest education level: Not on file   Tobacco Use   • Smoking status: Former Smoker   • Smokeless tobacco: Never Used   Substance and Sexual Activity   • Alcohol use: No     Frequency: Never   • Drug use: No   • Sexual activity: Defer       Current Outpatient Medications   Medication Sig Dispense Refill   • ALPRAZolam (XANAX) 0.5 MG tablet Take 1 tablet by mouth 3 (Three) Times a Day As Needed for Anxiety. 90 tablet 1   • azelastine (ASTEPRO) 0.15 % solution nasal spray 2 sprays into the nostril(s) as directed by provider Daily. 1 each 3   • bacitracin-polymyxin b ointment ophthalmic ointment Administer  to the right eye Every 4 (Four) Hours. 3.5 g 0   • diclofenac (VOLTAREN) 1 % gel gel Apply 4 g topically to the appropriate area as directed 4 (Four) Times a Day As Needed (pain and arthralgia). 100 g 3   • digoxin (LANOXIN) 125 MCG tablet Take 1 tablet by mouth Daily. 90 tablet 1   • dilTIAZem CD (CARDIZEM CD) 180 MG 24 hr capsule Take 1 capsule by mouth Daily. 90 capsule 1   • esomeprazole (nexIUM) 40 MG capsule Take 1 capsule by mouth Daily. 90 capsule 1   • estradiol (ESTRACE) 0.1 MG/GM vaginal cream Insert 1 g into the vagina 2 (Two) Times a Week.     • furosemide (LASIX) 20 MG tablet Take 2 tablets by mouth Every Morning AND 1 tablet Every Evening. 270 tablet 1   • lidocaine (XYLOCAINE) 5 % ointment Apply  topically to the appropriate area as directed 3 (Three) Times a Day. 150 g 2   • metoprolol succinate XL (TOPROL-XL) 50 MG 24 hr tablet Take 1 tablet by mouth Daily. 90 tablet 3   • olopatadine (PATANOL) 0.1 % ophthalmic solution Administer 1 drop to both eyes 2 (Two) Times a Day. 5 mL 1   • pravastatin (PRAVACHOL) 20 MG tablet Take 1 tablet by mouth Daily. 90 tablet 1   • PROBIOTIC PRODUCT PO Take  by mouth.     • warfarin (COUMADIN) 3 MG tablet Take 1 tablet by mouth Daily. 90  "tablet 1     No current facility-administered medications for this visit.        Review of Systems   Constitutional: Negative for activity change, appetite change, fatigue, fever, unexpected weight gain and unexpected weight loss.   HENT: Negative for nosebleeds, rhinorrhea, trouble swallowing and voice change.    Eyes: Negative for visual disturbance.   Respiratory: Negative for cough, chest tightness, shortness of breath and wheezing.    Cardiovascular: Negative for chest pain, palpitations and leg swelling.   Gastrointestinal: Negative for abdominal pain, blood in stool, constipation, diarrhea, nausea, vomiting, GERD and indigestion.   Genitourinary: Negative for dysuria, frequency and hematuria.   Musculoskeletal: Negative for arthralgias, back pain and myalgias.   Skin: Negative for rash and bruise.   Neurological: Negative for dizziness, tremors, weakness, light-headedness, numbness, headache and memory problem.   Hematological: Negative for adenopathy. Does not bruise/bleed easily.   Psychiatric/Behavioral: Negative for sleep disturbance and depressed mood. The patient is not nervous/anxious.        Objective   /70 (BP Location: Left arm, Patient Position: Sitting, Cuff Size: Adult)   Pulse 66   Temp 98.5 °F (36.9 °C) (Temporal)   Ht 154.9 cm (60.98\")   Wt 52.6 kg (116 lb)   SpO2 98%   BMI 21.93 kg/m²     Physical Exam   Constitutional: She is oriented to person, place, and time. She appears well-developed and well-nourished. No distress.   HENT:   Head: Normocephalic and atraumatic.   Right Ear: External ear normal.   Left Ear: External ear normal.   Nose: Nose normal.   Mouth/Throat: Oropharynx is clear and moist.   Eyes: Pupils are equal, round, and reactive to light. EOM are normal.   Right lower eyelid noted to have central area of redness and a soft tissue swelling with some cobblestoning and a localized area.  This resulted in some mild ptosis of the lower lid and pooling of her lacrimal " solution.   Neck: Normal range of motion. Neck supple. No tracheal deviation present. No thyromegaly present.   Cardiovascular: Normal rate, regular rhythm, normal heart sounds and intact distal pulses. Exam reveals no gallop and no friction rub.   No murmur heard.  Pulmonary/Chest: Effort normal and breath sounds normal. No respiratory distress.   Abdominal: Soft. Bowel sounds are normal. She exhibits no mass. There is no tenderness. There is no guarding.   Musculoskeletal: Normal range of motion. She exhibits no edema.   Lymphadenopathy:     She has no cervical adenopathy.   Neurological: She is alert and oriented to person, place, and time. She displays normal reflexes. She exhibits normal muscle tone.   Skin: Skin is warm and dry. Capillary refill takes less than 2 seconds. No rash noted. She is not diaphoretic.   Psychiatric: She has a normal mood and affect. Her behavior is normal. Judgment and thought content normal.   Nursing note and vitals reviewed.      Recent Results (from the past 2016 hour(s))   Protime-INR    Collection Time: 05/18/20 11:18 AM   Result Value Ref Range    INR 2.0 (H) 0.8 - 1.2    Protime 20.3 (H) 9.1 - 12.0 sec   POC INR    Collection Time: 06/18/20  9:56 AM   Result Value Ref Range    INR 2.20 (A) 2 - 3   POC INR    Collection Time: 07/16/20  9:48 AM   Result Value Ref Range    INR 2.10 (A) 2 - 3     Assessment/Plan   Stacey was seen today for anticoagulation.    Diagnoses and all orders for this visit:    Anticoagulated on warfarin    Atrial fibrillation, unspecified type (CMS/HCC)    Essential hypertension    Eyelid abnormality    Other orders  -     POC INR    Anticoagulation for atrial fibrillation is doing appropriately with her levels no changes in her medication is needed at this time we will recheck in 4 to 6 weeks.  Hypertension is very well controlled no changes are needed at this time.  Noted that her right lower lid had abnormality with swelling and redness causing some  ptosis of the lower lid.  I recommend that she follow-up with the eye doctor soon if she can will likely need to be evaluated and possibly even have a biopsy.

## 2020-08-03 DIAGNOSIS — I48.91 ATRIAL FIBRILLATION, UNSPECIFIED TYPE (HCC): ICD-10-CM

## 2020-08-03 DIAGNOSIS — I10 ESSENTIAL HYPERTENSION: ICD-10-CM

## 2020-08-03 RX ORDER — DILTIAZEM HYDROCHLORIDE 180 MG/1
180 CAPSULE, COATED, EXTENDED RELEASE ORAL DAILY
Qty: 90 CAPSULE | Refills: 1 | Status: SHIPPED | OUTPATIENT
Start: 2020-08-03 | End: 2021-01-31 | Stop reason: SDUPTHER

## 2020-08-07 DIAGNOSIS — I10 ESSENTIAL HYPERTENSION: ICD-10-CM

## 2020-08-10 RX ORDER — FUROSEMIDE 20 MG/1
TABLET ORAL
Qty: 270 TABLET | Refills: 0 | Status: SHIPPED | OUTPATIENT
Start: 2020-08-10 | End: 2020-11-05

## 2020-08-20 ENCOUNTER — OFFICE VISIT (OUTPATIENT)
Dept: FAMILY MEDICINE CLINIC | Facility: CLINIC | Age: 80
End: 2020-08-20

## 2020-08-20 VITALS
TEMPERATURE: 98.6 F | HEART RATE: 69 BPM | DIASTOLIC BLOOD PRESSURE: 82 MMHG | WEIGHT: 112 LBS | BODY MASS INDEX: 21.14 KG/M2 | OXYGEN SATURATION: 95 % | HEIGHT: 61 IN | SYSTOLIC BLOOD PRESSURE: 112 MMHG

## 2020-08-20 DIAGNOSIS — I10 ESSENTIAL HYPERTENSION: ICD-10-CM

## 2020-08-20 DIAGNOSIS — Z79.01 ANTICOAGULATED ON WARFARIN: Primary | ICD-10-CM

## 2020-08-20 DIAGNOSIS — R63.4 WEIGHT LOSS, ABNORMAL: ICD-10-CM

## 2020-08-20 DIAGNOSIS — E78.5 HYPERLIPIDEMIA, UNSPECIFIED HYPERLIPIDEMIA TYPE: ICD-10-CM

## 2020-08-20 DIAGNOSIS — I48.91 ATRIAL FIBRILLATION, UNSPECIFIED TYPE (HCC): ICD-10-CM

## 2020-08-20 PROBLEM — J06.9 URI (UPPER RESPIRATORY INFECTION): Status: RESOLVED | Noted: 2020-02-06 | Resolved: 2020-08-20

## 2020-08-20 LAB — INR PPP: 2.5 (ref 2–3)

## 2020-08-20 PROCEDURE — 85610 PROTHROMBIN TIME: CPT | Performed by: INTERNAL MEDICINE

## 2020-08-20 PROCEDURE — 99214 OFFICE O/P EST MOD 30 MIN: CPT | Performed by: INTERNAL MEDICINE

## 2020-08-20 NOTE — PROGRESS NOTES
Subjective   Stacey Rojo is a 80 y.o. female.     Chief Complaint   Patient presents with   • Anticoagulation       History of Present Illness   Here for follow up INR evaluation.  Currently anticoagulated with warfarin for atrial fibrillation.  The patient is taking warfarin 3 mg T, Th, S, S and 1.5 mg M,W,F.  The current INR goal is 2-3.  The current INR is 2.5.  No significant interval events such as easy bleeding, bruising, fever, weakness or numbness.     Has lost another 4 pounds from last visit.  States is eating well but not as much as she normally would normally be eating and no pains or weakness or cough or short of breath.  She has had some mild diarrhea which is been chronic for many years.  No blood in the urine or stool     Follow-up for cholesterol.  Currently, has been feeling well without any myalgias, muscle aches, weakness, numbness, chest pain, short of breath or other issues.  Currently, is adherent with medication regimen of pravastatin 20 mg a day and denies medication side effects. Labs done 9/16/19.     Follow-up for hypertension.  Currently, has been feeling well and asymptomatic without any headaches, vision changes, cough, chest pain, shortness of breath, swelling, focal neurologic deficit, memory loss or syncope.  Has been taking the medications regularly and adherent with the regimen of diltiazem CD 180mg daily, lasix 20 mg BID and metoprolol XL 50 mg 3 tabs daily.  Denies medication side effects and no significant interval events. Labs done 9/16/19.     Follow-up for anxiety.  Doing well currently and sleeping well with no recent panic attacks.  Using the alprazolam as needed but is usually taking only one a day and no noted side effects.  Does not need refill at this time.    The following portions of the patient's history were reviewed and updated as appropriate: allergies, current medications, past family history, past medical history, past social history, past surgical history  and problem list.    Depression Screen:  PHQ-2/PHQ-9 Depression Screening 9/16/2019   Little interest or pleasure in doing things 0   Feeling down, depressed, or hopeless 0   Total Score 0       Past Medical History:   Diagnosis Date   • A-fib (CMS/HCC)    • Abnormality of lung    • Acquired insufficiency of aortic valve    • Antral gastritis    • Anxiety    • Asymptomatic postmenopausal status    • Atrial fibrillation (CMS/HCC)    • Body mass index (BMI) 24.0-24.9, adult    • Chest pain, atypical    • COPD (chronic obstructive pulmonary disease) (CMS/HCC)    • Diverticulosis of colon    • ASHLEE (generalized anxiety disorder)    • GERD (gastroesophageal reflux disease)    • Heart disease    • Hiatal hernia    • High risk medication use    • Hyperactive bowel sounds    • Hyperlipidemia    • Hypertension    • Insomnia    • Intermittent claudication (CMS/HCC)    • Internal hemorrhoids    • Lower extremity edema    • Mitral insufficiency    • KOKO (obstructive sleep apnea)    • Osteopenia    • Pacemaker    • Pain of left patella    • Pedal edema    • Pulmonary hypertension (CMS/HCC)    • SSS (sick sinus syndrome) (CMS/HCC)    • Tricuspid regurgitation    • Urine frequency    • Uterovaginal prolapse    • Wheezing        Past Surgical History:   Procedure Laterality Date   • COLONOSCOPY  01/19/2015    diverticuli and hemorrhoids   • COLONOSCOPY  02/03/2009    diverticuli   • PACEMAKER IMPLANTATION     • PACEMAKER IMPLANTATION      SERIAL # [id number], model #W1DR01   • UPPER GASTROINTESTINAL ENDOSCOPY  02/03/2009    small hiatal hernia   • UPPER GASTROINTESTINAL ENDOSCOPY  01/19/2015    sliding small hiatal hernia       Family History   Problem Relation Age of Onset   • Cancer Mother         oral-mouth   • Coronary artery disease Mother    • Cancer Father         oral-mouth   • Cancer Brother         cholangiocarcinoma/ oral-mouth   • Leukemia Brother        Social History     Socioeconomic History   • Marital status:       Spouse name: Not on file   • Number of children: Not on file   • Years of education: Not on file   • Highest education level: Not on file   Tobacco Use   • Smoking status: Former Smoker   • Smokeless tobacco: Never Used   Substance and Sexual Activity   • Alcohol use: No     Frequency: Never   • Drug use: No   • Sexual activity: Defer       Current Outpatient Medications   Medication Sig Dispense Refill   • ALPRAZolam (XANAX) 0.5 MG tablet Take 1 tablet by mouth 3 (Three) Times a Day As Needed for Anxiety. 90 tablet 1   • azelastine (ASTEPRO) 0.15 % solution nasal spray 2 sprays into the nostril(s) as directed by provider Daily. 1 each 3   • diclofenac (VOLTAREN) 1 % gel gel Apply 4 g topically to the appropriate area as directed 4 (Four) Times a Day As Needed (pain and arthralgia). 100 g 3   • digoxin (LANOXIN) 125 MCG tablet Take 1 tablet by mouth Daily. 90 tablet 1   • dilTIAZem CD (CARDIZEM CD) 180 MG 24 hr capsule Take 1 capsule by mouth Daily. 90 capsule 1   • esomeprazole (nexIUM) 40 MG capsule Take 1 capsule by mouth Daily. 90 capsule 1   • estradiol (ESTRACE) 0.1 MG/GM vaginal cream Insert 1 g into the vagina 2 (Two) Times a Week.     • furosemide (LASIX) 20 MG tablet TAKE TWO TABLETS BY MOUTH EVERY MORNING AND TAKE ONE TABLET BY MOUTH EVERY EVENING 270 tablet 0   • metoprolol succinate XL (TOPROL-XL) 50 MG 24 hr tablet Take 1 tablet by mouth Daily. 90 tablet 3   • olopatadine (PATANOL) 0.1 % ophthalmic solution Administer 1 drop to both eyes 2 (Two) Times a Day. 5 mL 1   • pravastatin (PRAVACHOL) 20 MG tablet Take 1 tablet by mouth Daily. 90 tablet 1   • PROBIOTIC PRODUCT PO Take  by mouth.     • warfarin (COUMADIN) 3 MG tablet Take 1 tablet by mouth Daily. 90 tablet 1   • bacitracin-polymyxin b ointment ophthalmic ointment Administer  to the right eye Every 4 (Four) Hours. 3.5 g 0   • lidocaine (XYLOCAINE) 5 % ointment Apply  topically to the appropriate area as directed 3 (Three) Times a  "Day. 150 g 2     No current facility-administered medications for this visit.        Review of Systems   Constitutional: Negative for activity change, appetite change, fatigue, fever, unexpected weight gain and unexpected weight loss.   HENT: Negative for nosebleeds, rhinorrhea, trouble swallowing and voice change.    Eyes: Negative for visual disturbance.   Respiratory: Negative for cough, chest tightness, shortness of breath and wheezing.    Cardiovascular: Negative for chest pain, palpitations and leg swelling.   Gastrointestinal: Negative for abdominal pain, blood in stool, constipation, diarrhea, nausea, vomiting, GERD and indigestion.   Genitourinary: Negative for dysuria, frequency and hematuria.   Musculoskeletal: Negative for arthralgias, back pain and myalgias.   Skin: Negative for rash and bruise.   Neurological: Negative for dizziness, tremors, weakness, light-headedness, numbness, headache and memory problem.   Hematological: Negative for adenopathy. Does not bruise/bleed easily.   Psychiatric/Behavioral: Negative for sleep disturbance and depressed mood. The patient is not nervous/anxious.        Objective   /82 (BP Location: Left arm, Patient Position: Sitting, Cuff Size: Adult)   Pulse 69   Temp 98.6 °F (37 °C) (Temporal)   Ht 154.9 cm (60.98\")   Wt 50.8 kg (112 lb)   SpO2 95%   BMI 21.17 kg/m²     Physical Exam   Constitutional: She is oriented to person, place, and time. She appears well-developed and well-nourished. No distress.   HENT:   Head: Normocephalic and atraumatic.   Right Ear: External ear normal.   Left Ear: External ear normal.   Nose: Nose normal.   Mouth/Throat: Oropharynx is clear and moist.   Eyes: Pupils are equal, round, and reactive to light. Conjunctivae and EOM are normal.   Neck: Normal range of motion. Neck supple. No tracheal deviation present. No thyromegaly present.   Cardiovascular: Normal rate, regular rhythm, normal heart sounds and intact distal pulses. " Exam reveals no gallop and no friction rub.   No murmur heard.  Pulmonary/Chest: Effort normal and breath sounds normal. No respiratory distress.   Abdominal: Soft. Bowel sounds are normal. She exhibits no mass. There is no tenderness. There is no guarding.   Musculoskeletal: Normal range of motion. She exhibits no edema.   Lymphadenopathy:     She has no cervical adenopathy.   Neurological: She is alert and oriented to person, place, and time. She displays normal reflexes. She exhibits normal muscle tone.   Skin: Skin is warm and dry. Capillary refill takes less than 2 seconds. No rash noted. She is not diaphoretic.   Psychiatric: She has a normal mood and affect. Her behavior is normal. Judgment and thought content normal.   Nursing note and vitals reviewed.      Recent Results (from the past 2016 hour(s))   POC INR    Collection Time: 06/18/20  9:56 AM   Result Value Ref Range    INR 2.20 (A) 2 - 3   POC INR    Collection Time: 07/16/20  9:48 AM   Result Value Ref Range    INR 2.10 (A) 2 - 3   POC INR    Collection Time: 08/20/20  9:34 AM   Result Value Ref Range    INR 2.50 (A) 2 - 3     Assessment/Plan   Stacey was seen today for anticoagulation.    Diagnoses and all orders for this visit:    Anticoagulated on warfarin  -     CBC & Differential    Atrial fibrillation, unspecified type (CMS/HCC)    Essential hypertension  -     Comprehensive Metabolic Panel  -     Lipid Panel    Hyperlipidemia, unspecified hyperlipidemia type  -     Comprehensive Metabolic Panel  -     Lipid Panel    Weight loss, abnormal    Other orders  -     POC INR    Stay on the warfarin 1/2 tab MWF and whole tab remaining of week.  Follow-up in 4 to 6 weeks to recheck her INR.  Hypertension appears to be well controlled continue current medications and will check the complete metabolic panel and lipid panel secondary to hypertension hyperlipidemia to be sure there is nothing else going on and no interactions or problems from the  medications.  Patient with some noted weight loss of unknown cause but may be secondary to her decrease in her eating regimen.  I encouraged her to increase her dietary intake and possibly utilize nutritional shakes such as boost and Ensure.  Patient is understanding.  If she continues to lose weight then a further work-up including evaluation of colon and possibly even chest x-ray would be indicated.

## 2020-08-21 LAB
ALBUMIN SERPL-MCNC: 4.2 G/DL (ref 3.5–5.2)
ALBUMIN/GLOB SERPL: 1.5 G/DL
ALP SERPL-CCNC: 79 U/L (ref 39–117)
ALT SERPL-CCNC: 10 U/L (ref 1–33)
AST SERPL-CCNC: 15 U/L (ref 1–32)
BASOPHILS # BLD AUTO: 0.07 10*3/MM3 (ref 0–0.2)
BASOPHILS NFR BLD AUTO: 0.8 % (ref 0–1.5)
BILIRUB SERPL-MCNC: 0.4 MG/DL (ref 0–1.2)
BUN SERPL-MCNC: 11 MG/DL (ref 8–23)
BUN/CREAT SERPL: 17.7 (ref 7–25)
CALCIUM SERPL-MCNC: 9 MG/DL (ref 8.6–10.5)
CHLORIDE SERPL-SCNC: 99 MMOL/L (ref 98–107)
CHOLEST SERPL-MCNC: 163 MG/DL (ref 0–200)
CO2 SERPL-SCNC: 29.3 MMOL/L (ref 22–29)
CREAT SERPL-MCNC: 0.62 MG/DL (ref 0.57–1)
EOSINOPHIL # BLD AUTO: 0.12 10*3/MM3 (ref 0–0.4)
EOSINOPHIL NFR BLD AUTO: 1.3 % (ref 0.3–6.2)
ERYTHROCYTE [DISTWIDTH] IN BLOOD BY AUTOMATED COUNT: 13.2 % (ref 12.3–15.4)
GLOBULIN SER CALC-MCNC: 2.8 GM/DL
GLUCOSE SERPL-MCNC: 101 MG/DL (ref 65–99)
HCT VFR BLD AUTO: 37.1 % (ref 34–46.6)
HDLC SERPL-MCNC: 49 MG/DL (ref 40–60)
HGB BLD-MCNC: 12.1 G/DL (ref 12–15.9)
IMM GRANULOCYTES # BLD AUTO: 0.02 10*3/MM3 (ref 0–0.05)
IMM GRANULOCYTES NFR BLD AUTO: 0.2 % (ref 0–0.5)
LDLC SERPL CALC-MCNC: 92 MG/DL (ref 0–100)
LYMPHOCYTES # BLD AUTO: 2.31 10*3/MM3 (ref 0.7–3.1)
LYMPHOCYTES NFR BLD AUTO: 25 % (ref 19.6–45.3)
MCH RBC QN AUTO: 28.6 PG (ref 26.6–33)
MCHC RBC AUTO-ENTMCNC: 32.6 G/DL (ref 31.5–35.7)
MCV RBC AUTO: 87.7 FL (ref 79–97)
MONOCYTES # BLD AUTO: 0.86 10*3/MM3 (ref 0.1–0.9)
MONOCYTES NFR BLD AUTO: 9.3 % (ref 5–12)
NEUTROPHILS # BLD AUTO: 5.86 10*3/MM3 (ref 1.7–7)
NEUTROPHILS NFR BLD AUTO: 63.4 % (ref 42.7–76)
NRBC BLD AUTO-RTO: 0 /100 WBC (ref 0–0.2)
PLATELET # BLD AUTO: 320 10*3/MM3 (ref 140–450)
POTASSIUM SERPL-SCNC: 3.6 MMOL/L (ref 3.5–5.2)
PROT SERPL-MCNC: 7 G/DL (ref 6–8.5)
RBC # BLD AUTO: 4.23 10*6/MM3 (ref 3.77–5.28)
SODIUM SERPL-SCNC: 141 MMOL/L (ref 136–145)
TRIGL SERPL-MCNC: 112 MG/DL (ref 0–150)
VLDLC SERPL CALC-MCNC: 22.4 MG/DL
WBC # BLD AUTO: 9.24 10*3/MM3 (ref 3.4–10.8)

## 2020-09-09 RX ORDER — METOPROLOL SUCCINATE 50 MG/1
TABLET, EXTENDED RELEASE ORAL
Qty: 90 TABLET | Refills: 2 | Status: SHIPPED | OUTPATIENT
Start: 2020-09-09 | End: 2021-06-03

## 2020-09-24 DIAGNOSIS — F41.9 ANXIETY: ICD-10-CM

## 2020-09-24 DIAGNOSIS — K21.9 GASTROESOPHAGEAL REFLUX DISEASE WITHOUT ESOPHAGITIS: ICD-10-CM

## 2020-09-24 RX ORDER — ALPRAZOLAM 0.5 MG/1
0.5 TABLET ORAL 3 TIMES DAILY PRN
Qty: 90 TABLET | Refills: 1 | Status: SHIPPED | OUTPATIENT
Start: 2020-09-24 | End: 2021-01-31 | Stop reason: SDUPTHER

## 2020-09-24 RX ORDER — ESOMEPRAZOLE MAGNESIUM 40 MG/1
40 CAPSULE, DELAYED RELEASE ORAL DAILY
Qty: 90 CAPSULE | Refills: 1 | Status: SHIPPED | OUTPATIENT
Start: 2020-09-24 | End: 2021-03-03 | Stop reason: SDUPTHER

## 2020-09-28 ENCOUNTER — OFFICE VISIT (OUTPATIENT)
Dept: FAMILY MEDICINE CLINIC | Facility: CLINIC | Age: 80
End: 2020-09-28

## 2020-09-28 VITALS
HEIGHT: 61 IN | DIASTOLIC BLOOD PRESSURE: 68 MMHG | WEIGHT: 110 LBS | HEART RATE: 70 BPM | SYSTOLIC BLOOD PRESSURE: 114 MMHG | TEMPERATURE: 98.4 F | BODY MASS INDEX: 20.77 KG/M2 | OXYGEN SATURATION: 97 %

## 2020-09-28 DIAGNOSIS — E78.5 HYPERLIPIDEMIA, UNSPECIFIED HYPERLIPIDEMIA TYPE: ICD-10-CM

## 2020-09-28 DIAGNOSIS — I10 ESSENTIAL HYPERTENSION: ICD-10-CM

## 2020-09-28 DIAGNOSIS — F41.9 ANXIETY: ICD-10-CM

## 2020-09-28 DIAGNOSIS — Z79.01 ANTICOAGULATED ON WARFARIN: Primary | ICD-10-CM

## 2020-09-28 LAB — INR PPP: 1.7 (ref 2–3)

## 2020-09-28 PROCEDURE — 99213 OFFICE O/P EST LOW 20 MIN: CPT | Performed by: INTERNAL MEDICINE

## 2020-09-28 PROCEDURE — 85610 PROTHROMBIN TIME: CPT | Performed by: INTERNAL MEDICINE

## 2020-09-28 RX ORDER — TOBRAMYCIN AND DEXAMETHASONE 3; 1 MG/ML; MG/ML
SUSPENSION/ DROPS OPHTHALMIC
COMMUNITY
Start: 2020-08-20 | End: 2022-11-17

## 2020-09-28 NOTE — PROGRESS NOTES
Subjective   Stacey Rojo is a 80 y.o. female.     Chief Complaint   Patient presents with   • Anticoagulation     5 week check up       History of Present Illness   Here for follow up INR evaluation.  Currently anticoagulated with warfarin for atrial fibrillation.  The patient is taking warfarin 3 mg T, Th, S, S and 1.5 mg M,W,F.  The current INR goal is 2-3.  The current INR is 1.7.  No significant interval events such as easy bleeding, bruising, fever, weakness or numbness.     Follow-up for cholesterol.  Currently, has been feeling well without any myalgias, muscle aches, weakness, numbness, chest pain, short of breath or other issues.  Currently, is adherent with medication regimen of pravastatin 20 mg a day and denies medication side effects. Labs done 8/20/2020.     Follow-up for hypertension.  Currently, has been feeling well and asymptomatic without any headaches, vision changes, cough, chest pain, shortness of breath, swelling, focal neurologic deficit, memory loss or syncope.  Has been taking the medications regularly and adherent with the regimen of diltiazem CD 180mg daily, lasix 20 mg BID and metoprolol XL 50 mg 3 tabs daily.  Denies medication side effects and no significant interval events. Labs done 8/20/2020.     Follow-up for anxiety.  Doing well currently and sleeping well with no recent panic attacks.  Using the alprazolam as needed but is usually taking only one a day and no noted side effects.  Does not need refill at this time.  The following portions of the patient's history were reviewed and updated as appropriate: allergies, current medications, past family history, past medical history, past social history, past surgical history and problem list.    Depression Screen:  PHQ-2/PHQ-9 Depression Screening 9/28/2020   Little interest or pleasure in doing things 0   Feeling down, depressed, or hopeless 0   Total Score 0       Past Medical History:   Diagnosis Date   • A-fib (CMS/HCC)    •  Abnormality of lung    • Acquired insufficiency of aortic valve    • Antral gastritis    • Anxiety    • Asymptomatic postmenopausal status    • Atrial fibrillation (CMS/HCC)    • Body mass index (BMI) 24.0-24.9, adult    • Chest pain, atypical    • COPD (chronic obstructive pulmonary disease) (CMS/HCC)    • Diverticulosis of colon    • ASHLEE (generalized anxiety disorder)    • GERD (gastroesophageal reflux disease)    • Heart disease    • Hiatal hernia    • High risk medication use    • Hyperactive bowel sounds    • Hyperlipidemia    • Hypertension    • Insomnia    • Intermittent claudication (CMS/HCC)    • Internal hemorrhoids    • Lower extremity edema    • Mitral insufficiency    • KOKO (obstructive sleep apnea)    • Osteopenia    • Pacemaker    • Pain of left patella    • Pedal edema    • Pulmonary hypertension (CMS/HCC)    • SSS (sick sinus syndrome) (CMS/HCC)    • Tricuspid regurgitation    • Urine frequency    • Uterovaginal prolapse    • Wheezing        Past Surgical History:   Procedure Laterality Date   • COLONOSCOPY  01/19/2015    diverticuli and hemorrhoids   • COLONOSCOPY  02/03/2009    diverticuli   • PACEMAKER IMPLANTATION     • PACEMAKER IMPLANTATION      SERIAL # [id number], model #[id number]   • UPPER GASTROINTESTINAL ENDOSCOPY  02/03/2009    small hiatal hernia   • UPPER GASTROINTESTINAL ENDOSCOPY  01/19/2015    sliding small hiatal hernia       Family History   Problem Relation Age of Onset   • Cancer Mother         oral-mouth   • Coronary artery disease Mother    • Cancer Father         oral-mouth   • Cancer Brother         cholangiocarcinoma/ oral-mouth   • Leukemia Brother        Social History     Socioeconomic History   • Marital status:      Spouse name: Not on file   • Number of children: Not on file   • Years of education: Not on file   • Highest education level: Not on file   Tobacco Use   • Smoking status: Former Smoker   • Smokeless tobacco: Never Used   Substance and Sexual Activity    • Alcohol use: No     Frequency: Never   • Drug use: No   • Sexual activity: Defer       Current Outpatient Medications   Medication Sig Dispense Refill   • ALPRAZolam (XANAX) 0.5 MG tablet Take 1 tablet by mouth 3 (Three) Times a Day As Needed for Anxiety. 90 tablet 1   • diclofenac (VOLTAREN) 1 % gel gel Apply 4 g topically to the appropriate area as directed 4 (Four) Times a Day As Needed (pain and arthralgia). 100 g 3   • digoxin (LANOXIN) 125 MCG tablet Take 1 tablet by mouth Daily. 90 tablet 1   • dilTIAZem CD (CARDIZEM CD) 180 MG 24 hr capsule Take 1 capsule by mouth Daily. 90 capsule 1   • esomeprazole (nexIUM) 40 MG capsule Take 1 capsule by mouth Daily. 90 capsule 1   • estradiol (ESTRACE) 0.1 MG/GM vaginal cream Insert 1 g into the vagina 2 (Two) Times a Week.     • furosemide (LASIX) 20 MG tablet TAKE TWO TABLETS BY MOUTH EVERY MORNING AND TAKE ONE TABLET BY MOUTH EVERY EVENING 270 tablet 0   • lidocaine (XYLOCAINE) 5 % ointment Apply  topically to the appropriate area as directed 3 (Three) Times a Day. 150 g 2   • metoprolol succinate XL (TOPROL-XL) 50 MG 24 hr tablet TAKE ONE TABLET BY MOUTH DAILY 90 tablet 2   • pravastatin (PRAVACHOL) 20 MG tablet Take 1 tablet by mouth Daily. 90 tablet 1   • warfarin (COUMADIN) 3 MG tablet Take 1 tablet by mouth Daily. 90 tablet 1   • azelastine (ASTEPRO) 0.15 % solution nasal spray 2 sprays into the nostril(s) as directed by provider Daily. 1 each 3   • bacitracin-polymyxin b ointment ophthalmic ointment Administer  to the right eye Every 4 (Four) Hours. 3.5 g 0   • olopatadine (PATANOL) 0.1 % ophthalmic solution Administer 1 drop to both eyes 2 (Two) Times a Day. 5 mL 1   • PROBIOTIC PRODUCT PO Take  by mouth.     • tobramycin-dexamethasone (TOBRADEX) 0.3-0.1 % ophthalmic suspension        No current facility-administered medications for this visit.        Review of Systems   Constitutional: Negative for activity change, appetite change, fatigue, fever,  "unexpected weight gain and unexpected weight loss.   HENT: Negative for nosebleeds, rhinorrhea, trouble swallowing and voice change.    Eyes: Negative for visual disturbance.   Respiratory: Negative for cough, chest tightness, shortness of breath and wheezing.    Cardiovascular: Negative for chest pain, palpitations and leg swelling.   Gastrointestinal: Negative for abdominal pain, blood in stool, constipation, diarrhea, nausea, vomiting, GERD and indigestion.   Genitourinary: Negative for dysuria, frequency and hematuria.   Musculoskeletal: Negative for arthralgias, back pain and myalgias.   Skin: Negative for rash and bruise.   Neurological: Negative for dizziness, tremors, weakness, light-headedness, numbness, headache and memory problem.   Hematological: Negative for adenopathy. Does not bruise/bleed easily.   Psychiatric/Behavioral: Negative for sleep disturbance and depressed mood. The patient is not nervous/anxious.        Objective   /68 (BP Location: Left arm, Patient Position: Sitting, Cuff Size: Adult)   Pulse 70   Temp 98.4 °F (36.9 °C) (Infrared)   Ht 154.9 cm (60.98\")   Wt 49.9 kg (110 lb)   SpO2 97%   BMI 20.80 kg/m²     Physical Exam  Vitals signs and nursing note reviewed.   Constitutional:       General: She is not in acute distress.     Appearance: She is well-developed. She is not diaphoretic.   HENT:      Head: Normocephalic and atraumatic.      Right Ear: External ear normal.      Left Ear: External ear normal.      Nose: Nose normal.   Eyes:      Conjunctiva/sclera: Conjunctivae normal.      Pupils: Pupils are equal, round, and reactive to light.   Neck:      Musculoskeletal: Normal range of motion and neck supple.      Thyroid: No thyromegaly.      Trachea: No tracheal deviation.   Cardiovascular:      Rate and Rhythm: Normal rate and regular rhythm.      Heart sounds: Normal heart sounds. No murmur. No friction rub. No gallop.    Pulmonary:      Effort: Pulmonary effort is " normal. No respiratory distress.      Breath sounds: Normal breath sounds.   Abdominal:      General: Bowel sounds are normal.      Palpations: Abdomen is soft. There is no mass.      Tenderness: There is no abdominal tenderness. There is no guarding.   Musculoskeletal: Normal range of motion.   Lymphadenopathy:      Cervical: No cervical adenopathy.   Skin:     General: Skin is warm and dry.      Capillary Refill: Capillary refill takes less than 2 seconds.      Findings: No rash.   Neurological:      Mental Status: She is alert and oriented to person, place, and time.      Motor: No abnormal muscle tone.      Deep Tendon Reflexes: Reflexes normal.   Psychiatric:         Behavior: Behavior normal.         Thought Content: Thought content normal.         Judgment: Judgment normal.         Recent Results (from the past 2016 hour(s))   POC INR    Collection Time: 07/16/20  9:48 AM    Specimen: Blood   Result Value Ref Range    INR 2.10 (A) 2 - 3   POC INR    Collection Time: 08/20/20  9:34 AM    Specimen: Blood   Result Value Ref Range    INR 2.50 (A) 2 - 3   Comprehensive Metabolic Panel    Collection Time: 08/20/20 10:08 AM    Specimen: Blood   Result Value Ref Range    Glucose 101 (H) 65 - 99 mg/dL    BUN 11 8 - 23 mg/dL    Creatinine 0.62 0.57 - 1.00 mg/dL    eGFR Non African Am 93 >60 mL/min/1.73    eGFR African Am 113 >60 mL/min/1.73    BUN/Creatinine Ratio 17.7 7.0 - 25.0    Sodium 141 136 - 145 mmol/L    Potassium 3.6 3.5 - 5.2 mmol/L    Chloride 99 98 - 107 mmol/L    Total CO2 29.3 (H) 22.0 - 29.0 mmol/L    Calcium 9.0 8.6 - 10.5 mg/dL    Total Protein 7.0 6.0 - 8.5 g/dL    Albumin 4.20 3.50 - 5.20 g/dL    Globulin 2.8 gm/dL    A/G Ratio 1.5 g/dL    Total Bilirubin 0.4 0.0 - 1.2 mg/dL    Alkaline Phosphatase 79 39 - 117 U/L    AST (SGOT) 15 1 - 32 U/L    ALT (SGPT) 10 1 - 33 U/L   Lipid Panel    Collection Time: 08/20/20 10:08 AM    Specimen: Blood   Result Value Ref Range    Total Cholesterol 163 0 - 200  mg/dL    Triglycerides 112 0 - 150 mg/dL    HDL Cholesterol 49 40 - 60 mg/dL    VLDL Cholesterol 22.4 mg/dL    LDL Cholesterol  92 0 - 100 mg/dL   CBC & Differential    Collection Time: 08/20/20 10:08 AM    Specimen: Blood   Result Value Ref Range    WBC 9.24 3.40 - 10.80 10*3/mm3    RBC 4.23 3.77 - 5.28 10*6/mm3    Hemoglobin 12.1 12.0 - 15.9 g/dL    Hematocrit 37.1 34.0 - 46.6 %    MCV 87.7 79.0 - 97.0 fL    MCH 28.6 26.6 - 33.0 pg    MCHC 32.6 31.5 - 35.7 g/dL    RDW 13.2 12.3 - 15.4 %    Platelets 320 140 - 450 10*3/mm3    Neutrophil Rel % 63.4 42.7 - 76.0 %    Lymphocyte Rel % 25.0 19.6 - 45.3 %    Monocyte Rel % 9.3 5.0 - 12.0 %    Eosinophil Rel % 1.3 0.3 - 6.2 %    Basophil Rel % 0.8 0.0 - 1.5 %    Neutrophils Absolute 5.86 1.70 - 7.00 10*3/mm3    Lymphocytes Absolute 2.31 0.70 - 3.10 10*3/mm3    Monocytes Absolute 0.86 0.10 - 0.90 10*3/mm3    Eosinophils Absolute 0.12 0.00 - 0.40 10*3/mm3    Basophils Absolute 0.07 0.00 - 0.20 10*3/mm3    Immature Granulocyte Rel % 0.2 0.0 - 0.5 %    Immature Grans Absolute 0.02 0.00 - 0.05 10*3/mm3    nRBC 0.0 0.0 - 0.2 /100 WBC   POC INR    Collection Time: 09/28/20  2:48 PM    Specimen: Blood   Result Value Ref Range    INR 1.70 (A) 2 - 3     Assessment/Plan   There are no diagnoses linked to this encounter.  Take 3 mg today and 4.5 mg tomorrow then return to 3 mg every day except M/W/F 1.5 mg.  Recheck in 2 weeks.  If still low inr then increase the warfarin slightly.

## 2020-10-12 ENCOUNTER — OFFICE VISIT (OUTPATIENT)
Dept: FAMILY MEDICINE CLINIC | Facility: CLINIC | Age: 80
End: 2020-10-12

## 2020-10-12 VITALS
SYSTOLIC BLOOD PRESSURE: 128 MMHG | HEIGHT: 61 IN | DIASTOLIC BLOOD PRESSURE: 70 MMHG | TEMPERATURE: 98.4 F | OXYGEN SATURATION: 96 % | BODY MASS INDEX: 20.77 KG/M2 | WEIGHT: 110 LBS | HEART RATE: 83 BPM

## 2020-10-12 DIAGNOSIS — Z79.01 ANTICOAGULATED ON WARFARIN: Primary | ICD-10-CM

## 2020-10-12 DIAGNOSIS — I48.20 CHRONIC ATRIAL FIBRILLATION (HCC): ICD-10-CM

## 2020-10-12 DIAGNOSIS — I10 ESSENTIAL HYPERTENSION: ICD-10-CM

## 2020-10-12 DIAGNOSIS — Z79.01 ANTICOAGULATED ON WARFARIN: ICD-10-CM

## 2020-10-12 LAB — INR PPP: 2 (ref 2–3)

## 2020-10-12 PROCEDURE — 99213 OFFICE O/P EST LOW 20 MIN: CPT | Performed by: INTERNAL MEDICINE

## 2020-10-12 PROCEDURE — 85610 PROTHROMBIN TIME: CPT | Performed by: INTERNAL MEDICINE

## 2020-10-12 PROCEDURE — 36416 COLLJ CAPILLARY BLOOD SPEC: CPT | Performed by: INTERNAL MEDICINE

## 2020-10-12 RX ORDER — WARFARIN SODIUM 3 MG/1
TABLET ORAL
Qty: 90 TABLET | Refills: 0 | Status: SHIPPED | OUTPATIENT
Start: 2020-10-12 | End: 2021-01-11

## 2020-10-12 NOTE — PROGRESS NOTES
Subjective   Stacey Rojo is a 80 y.o. female.     Chief Complaint   Patient presents with   • PT/INR     RECHECK        History of Present Illness   Here for follow up INR evaluation.  Currently anticoagulated with warfarin for atrial fibrillation.  The patient is taking warfarin 3 mg T, Th, S, S and 1.5 mg M,W,F.  The current INR goal is 2-3.  The current INR is 1.7.  No significant interval events such as easy bleeding, bruising, fever, weakness or numbness.      Follow-up for cholesterol.  Currently, has been feeling well without any myalgias, muscle aches, weakness, numbness, chest pain, short of breath or other issues.  Currently, is adherent with medication regimen of pravastatin 20 mg a day and denies medication side effects. Labs done 8/20/2020.     Follow-up for hypertension.  Currently, has been feeling well and asymptomatic without any headaches, vision changes, cough, chest pain, shortness of breath, swelling, focal neurologic deficit, memory loss or syncope.  Has been taking the medications regularly and adherent with the regimen of diltiazem CD 180mg daily, lasix 20 mg BID and metoprolol XL 50 mg 3 tabs daily.  Denies medication side effects and no significant interval events. Labs done 8/20/2020.     Follow-up for anxiety.  Doing well currently and sleeping well with no recent panic attacks.  Using the alprazolam as needed but is usually taking only one a day and no noted side effects.  Does not need refill at this time.    The following portions of the patient's history were reviewed and updated as appropriate: allergies, current medications, past family history, past medical history, past social history, past surgical history and problem list.    Depression Screen:  PHQ-2/PHQ-9 Depression Screening 9/28/2020   Little interest or pleasure in doing things 0   Feeling down, depressed, or hopeless 0   Total Score 0       Past Medical History:   Diagnosis Date   • A-fib (CMS/HCC)    • Abnormality of  lung    • Acquired insufficiency of aortic valve    • Antral gastritis    • Anxiety    • Asymptomatic postmenopausal status    • Atrial fibrillation (CMS/HCC)    • Body mass index (BMI) 24.0-24.9, adult    • Chest pain, atypical    • COPD (chronic obstructive pulmonary disease) (CMS/HCC)    • Diverticulosis of colon    • ASHLEE (generalized anxiety disorder)    • GERD (gastroesophageal reflux disease)    • Heart disease    • Hiatal hernia    • High risk medication use    • Hyperactive bowel sounds    • Hyperlipidemia    • Hypertension    • Insomnia    • Intermittent claudication (CMS/HCC)    • Internal hemorrhoids    • Lower extremity edema    • Mitral insufficiency    • KOKO (obstructive sleep apnea)    • Osteopenia    • Pacemaker    • Pain of left patella    • Pedal edema    • Pulmonary hypertension (CMS/HCC)    • SSS (sick sinus syndrome) (CMS/HCC)    • Tricuspid regurgitation    • Urine frequency    • Uterovaginal prolapse    • Wheezing        Past Surgical History:   Procedure Laterality Date   • COLONOSCOPY  01/19/2015    diverticuli and hemorrhoids   • COLONOSCOPY  02/03/2009    diverticuli   • PACEMAKER IMPLANTATION     • PACEMAKER IMPLANTATION      SERIAL # [id number], model #[id number]   • UPPER GASTROINTESTINAL ENDOSCOPY  02/03/2009    small hiatal hernia   • UPPER GASTROINTESTINAL ENDOSCOPY  01/19/2015    sliding small hiatal hernia       Family History   Problem Relation Age of Onset   • Cancer Mother         oral-mouth   • Coronary artery disease Mother    • Cancer Father         oral-mouth   • Cancer Brother         cholangiocarcinoma/ oral-mouth   • Leukemia Brother        Social History     Socioeconomic History   • Marital status:      Spouse name: Not on file   • Number of children: Not on file   • Years of education: Not on file   • Highest education level: Not on file   Tobacco Use   • Smoking status: Former Smoker   • Smokeless tobacco: Never Used   Substance and Sexual Activity   • Alcohol  use: No     Frequency: Never   • Drug use: No   • Sexual activity: Defer       Current Outpatient Medications   Medication Sig Dispense Refill   • ALPRAZolam (XANAX) 0.5 MG tablet Take 1 tablet by mouth 3 (Three) Times a Day As Needed for Anxiety. 90 tablet 1   • azelastine (ASTEPRO) 0.15 % solution nasal spray 2 sprays into the nostril(s) as directed by provider Daily. 1 each 3   • bacitracin-polymyxin b ointment ophthalmic ointment Administer  to the right eye Every 4 (Four) Hours. 3.5 g 0   • diclofenac (VOLTAREN) 1 % gel gel Apply 4 g topically to the appropriate area as directed 4 (Four) Times a Day As Needed (pain and arthralgia). 100 g 3   • digoxin (LANOXIN) 125 MCG tablet Take 1 tablet by mouth Daily. 90 tablet 1   • dilTIAZem CD (CARDIZEM CD) 180 MG 24 hr capsule Take 1 capsule by mouth Daily. 90 capsule 1   • esomeprazole (nexIUM) 40 MG capsule Take 1 capsule by mouth Daily. 90 capsule 1   • estradiol (ESTRACE) 0.1 MG/GM vaginal cream Insert 1 g into the vagina 2 (Two) Times a Week.     • furosemide (LASIX) 20 MG tablet TAKE TWO TABLETS BY MOUTH EVERY MORNING AND TAKE ONE TABLET BY MOUTH EVERY EVENING 270 tablet 0   • lidocaine (XYLOCAINE) 5 % ointment Apply  topically to the appropriate area as directed 3 (Three) Times a Day. 150 g 2   • metoprolol succinate XL (TOPROL-XL) 50 MG 24 hr tablet TAKE ONE TABLET BY MOUTH DAILY 90 tablet 2   • olopatadine (PATANOL) 0.1 % ophthalmic solution Administer 1 drop to both eyes 2 (Two) Times a Day. 5 mL 1   • pravastatin (PRAVACHOL) 20 MG tablet Take 1 tablet by mouth Daily. 90 tablet 1   • PROBIOTIC PRODUCT PO Take  by mouth.     • tobramycin-dexamethasone (TOBRADEX) 0.3-0.1 % ophthalmic suspension      • warfarin (COUMADIN) 3 MG tablet TAKE ONE TABLET BY MOUTH DAILY 90 tablet 0     No current facility-administered medications for this visit.        Review of Systems   Constitutional: Negative for activity change, appetite change, fatigue, fever, unexpected weight  "gain and unexpected weight loss.   HENT: Negative for nosebleeds, rhinorrhea, trouble swallowing and voice change.    Eyes: Negative for visual disturbance.   Respiratory: Negative for cough, chest tightness, shortness of breath and wheezing.    Cardiovascular: Negative for chest pain, palpitations and leg swelling.   Gastrointestinal: Negative for abdominal pain, blood in stool, constipation, diarrhea, nausea, vomiting, GERD and indigestion.   Genitourinary: Negative for dysuria, frequency and hematuria.   Musculoskeletal: Negative for arthralgias, back pain and myalgias.   Skin: Negative for rash and bruise.   Neurological: Negative for dizziness, tremors, weakness, light-headedness, numbness, headache and memory problem.   Hematological: Negative for adenopathy. Does not bruise/bleed easily.   Psychiatric/Behavioral: Negative for sleep disturbance and depressed mood. The patient is not nervous/anxious.        Objective   /70 (BP Location: Left arm, Patient Position: Sitting, Cuff Size: Adult)   Pulse 83   Temp 98.4 °F (36.9 °C)   Ht 154.9 cm (60.98\")   Wt 49.9 kg (110 lb)   SpO2 96%   BMI 20.80 kg/m²     Physical Exam  Vitals signs and nursing note reviewed.   Constitutional:       General: She is not in acute distress.     Appearance: She is well-developed. She is not diaphoretic.   HENT:      Head: Normocephalic and atraumatic.      Right Ear: External ear normal.      Left Ear: External ear normal.      Nose: Nose normal.   Eyes:      Conjunctiva/sclera: Conjunctivae normal.      Pupils: Pupils are equal, round, and reactive to light.   Neck:      Musculoskeletal: Normal range of motion and neck supple.      Thyroid: No thyromegaly.      Trachea: No tracheal deviation.   Cardiovascular:      Rate and Rhythm: Normal rate and regular rhythm.      Heart sounds: Normal heart sounds. No murmur. No friction rub. No gallop.    Pulmonary:      Effort: Pulmonary effort is normal. No respiratory distress.    "   Breath sounds: Normal breath sounds.   Abdominal:      General: Bowel sounds are normal.      Palpations: Abdomen is soft. There is no mass.      Tenderness: There is no abdominal tenderness. There is no guarding.   Musculoskeletal: Normal range of motion.   Lymphadenopathy:      Cervical: No cervical adenopathy.   Skin:     General: Skin is warm and dry.      Capillary Refill: Capillary refill takes less than 2 seconds.      Findings: No rash.   Neurological:      Mental Status: She is alert and oriented to person, place, and time.      Motor: No abnormal muscle tone.      Deep Tendon Reflexes: Reflexes normal.   Psychiatric:         Behavior: Behavior normal.         Thought Content: Thought content normal.         Judgment: Judgment normal.         Recent Results (from the past 2016 hour(s))   POC INR    Collection Time: 08/20/20  9:34 AM    Specimen: Blood   Result Value Ref Range    INR 2.50 (A) 2 - 3   Comprehensive Metabolic Panel    Collection Time: 08/20/20 10:08 AM    Specimen: Blood   Result Value Ref Range    Glucose 101 (H) 65 - 99 mg/dL    BUN 11 8 - 23 mg/dL    Creatinine 0.62 0.57 - 1.00 mg/dL    eGFR Non African Am 93 >60 mL/min/1.73    eGFR African Am 113 >60 mL/min/1.73    BUN/Creatinine Ratio 17.7 7.0 - 25.0    Sodium 141 136 - 145 mmol/L    Potassium 3.6 3.5 - 5.2 mmol/L    Chloride 99 98 - 107 mmol/L    Total CO2 29.3 (H) 22.0 - 29.0 mmol/L    Calcium 9.0 8.6 - 10.5 mg/dL    Total Protein 7.0 6.0 - 8.5 g/dL    Albumin 4.20 3.50 - 5.20 g/dL    Globulin 2.8 gm/dL    A/G Ratio 1.5 g/dL    Total Bilirubin 0.4 0.0 - 1.2 mg/dL    Alkaline Phosphatase 79 39 - 117 U/L    AST (SGOT) 15 1 - 32 U/L    ALT (SGPT) 10 1 - 33 U/L   Lipid Panel    Collection Time: 08/20/20 10:08 AM    Specimen: Blood   Result Value Ref Range    Total Cholesterol 163 0 - 200 mg/dL    Triglycerides 112 0 - 150 mg/dL    HDL Cholesterol 49 40 - 60 mg/dL    VLDL Cholesterol 22.4 mg/dL    LDL Cholesterol  92 0 - 100 mg/dL   CBC &  Differential    Collection Time: 08/20/20 10:08 AM    Specimen: Blood   Result Value Ref Range    WBC 9.24 3.40 - 10.80 10*3/mm3    RBC 4.23 3.77 - 5.28 10*6/mm3    Hemoglobin 12.1 12.0 - 15.9 g/dL    Hematocrit 37.1 34.0 - 46.6 %    MCV 87.7 79.0 - 97.0 fL    MCH 28.6 26.6 - 33.0 pg    MCHC 32.6 31.5 - 35.7 g/dL    RDW 13.2 12.3 - 15.4 %    Platelets 320 140 - 450 10*3/mm3    Neutrophil Rel % 63.4 42.7 - 76.0 %    Lymphocyte Rel % 25.0 19.6 - 45.3 %    Monocyte Rel % 9.3 5.0 - 12.0 %    Eosinophil Rel % 1.3 0.3 - 6.2 %    Basophil Rel % 0.8 0.0 - 1.5 %    Neutrophils Absolute 5.86 1.70 - 7.00 10*3/mm3    Lymphocytes Absolute 2.31 0.70 - 3.10 10*3/mm3    Monocytes Absolute 0.86 0.10 - 0.90 10*3/mm3    Eosinophils Absolute 0.12 0.00 - 0.40 10*3/mm3    Basophils Absolute 0.07 0.00 - 0.20 10*3/mm3    Immature Granulocyte Rel % 0.2 0.0 - 0.5 %    Immature Grans Absolute 0.02 0.00 - 0.05 10*3/mm3    nRBC 0.0 0.0 - 0.2 /100 WBC   POC INR    Collection Time: 09/28/20  2:48 PM    Specimen: Blood   Result Value Ref Range    INR 1.70 (A) 2 - 3   POC INR    Collection Time: 10/12/20  2:51 PM    Specimen: Blood   Result Value Ref Range    INR 2.00 (A) 2 - 3     Assessment/Plan   Stacey was seen today for pt/inr.    Diagnoses and all orders for this visit:    Anticoagulated on warfarin    Essential hypertension    Other orders  -     POC INR    Appropriate anticoagulation.  Continue the current warfarin unchanged at this time recheck the level in approximately 1 month in office.  Hypertension is doing well and controlled no changes are needed at this time.

## 2020-10-28 RX ORDER — DIGOXIN 125 MCG
125 TABLET ORAL DAILY
Qty: 90 TABLET | Refills: 1 | Status: SHIPPED | OUTPATIENT
Start: 2020-10-28 | End: 2021-04-19 | Stop reason: SDUPTHER

## 2020-11-04 DIAGNOSIS — I10 ESSENTIAL HYPERTENSION: ICD-10-CM

## 2020-11-05 RX ORDER — FUROSEMIDE 20 MG/1
TABLET ORAL
Qty: 270 TABLET | Refills: 0 | Status: SHIPPED | OUTPATIENT
Start: 2020-11-05 | End: 2021-02-01

## 2020-11-16 ENCOUNTER — OFFICE VISIT (OUTPATIENT)
Dept: FAMILY MEDICINE CLINIC | Facility: CLINIC | Age: 80
End: 2020-11-16

## 2020-11-16 VITALS
HEART RATE: 78 BPM | SYSTOLIC BLOOD PRESSURE: 136 MMHG | WEIGHT: 110 LBS | OXYGEN SATURATION: 98 % | DIASTOLIC BLOOD PRESSURE: 82 MMHG | HEIGHT: 61 IN | TEMPERATURE: 98.4 F | BODY MASS INDEX: 20.77 KG/M2

## 2020-11-16 DIAGNOSIS — Z79.01 ANTICOAGULATED ON WARFARIN: Primary | ICD-10-CM

## 2020-11-16 DIAGNOSIS — I10 ESSENTIAL HYPERTENSION: ICD-10-CM

## 2020-11-16 DIAGNOSIS — I48.91 ATRIAL FIBRILLATION, UNSPECIFIED TYPE (HCC): ICD-10-CM

## 2020-11-16 LAB — INR PPP: 2.2 (ref 2–3)

## 2020-11-16 PROCEDURE — 85610 PROTHROMBIN TIME: CPT | Performed by: INTERNAL MEDICINE

## 2020-11-16 PROCEDURE — 36416 COLLJ CAPILLARY BLOOD SPEC: CPT | Performed by: INTERNAL MEDICINE

## 2020-11-16 PROCEDURE — 99213 OFFICE O/P EST LOW 20 MIN: CPT | Performed by: INTERNAL MEDICINE

## 2020-11-16 NOTE — PROGRESS NOTES
Subjective   Stacey Rojo is a 80 y.o. female.     Chief Complaint   Patient presents with   • Anticoagulation       History of Present Illness   Here for follow up INR evaluation.  Currently anticoagulated with warfarin for atrial fibrillation.  The patient is taking warfarin 3 mg T, Th, S, S and 1.5 mg M,W,F.  The current INR goal is 2-3.  The current INR is 2.2.  No significant interval events such as easy bleeding, bruising, fever, weakness or numbness.      Follow-up for cholesterol.  Currently, has been feeling well without any myalgias, muscle aches, weakness, numbness, chest pain, short of breath or other issues.  Currently, is adherent with medication regimen of pravastatin 20 mg a day and denies medication side effects. Labs done 8/20/2020.     Follow-up for hypertension.  Currently, has been feeling well and asymptomatic without any headaches, vision changes, cough, chest pain, shortness of breath, swelling, focal neurologic deficit, memory loss or syncope.  Has been taking the medications regularly and adherent with the regimen of diltiazem CD 180mg daily, lasix 20 mg BID and metoprolol XL 50 mg 3 tabs daily.  Denies medication side effects and no significant interval events. Labs done 8/20/2020.     Follow-up for anxiety.  Doing well currently and sleeping well with no recent panic attacks.  Using the alprazolam as needed but is usually taking only one a day and no noted side effects.  Does not need refill at this time.    The following portions of the patient's history were reviewed and updated as appropriate: allergies, current medications, past family history, past medical history, past social history, past surgical history and problem list.    Depression Screen:  PHQ-2/PHQ-9 Depression Screening 9/28/2020   Little interest or pleasure in doing things 0   Feeling down, depressed, or hopeless 0   Total Score 0       Past Medical History:   Diagnosis Date   • A-fib (CMS/HCC)    • Abnormality of lung     • Acquired insufficiency of aortic valve    • Antral gastritis    • Anxiety    • Asymptomatic postmenopausal status    • Atrial fibrillation (CMS/HCC)    • Body mass index (BMI) 24.0-24.9, adult    • Chest pain, atypical    • COPD (chronic obstructive pulmonary disease) (CMS/HCC)    • Diverticulosis of colon    • ASHLEE (generalized anxiety disorder)    • GERD (gastroesophageal reflux disease)    • Heart disease    • Hiatal hernia    • High risk medication use    • Hyperactive bowel sounds    • Hyperlipidemia    • Hypertension    • Insomnia    • Intermittent claudication (CMS/HCC)    • Internal hemorrhoids    • Lower extremity edema    • Mitral insufficiency    • KOKO (obstructive sleep apnea)    • Osteopenia    • Pacemaker    • Pain of left patella    • Pedal edema    • Pulmonary hypertension (CMS/HCC)    • SSS (sick sinus syndrome) (CMS/HCC)    • Tricuspid regurgitation    • Urine frequency    • Uterovaginal prolapse    • Wheezing        Past Surgical History:   Procedure Laterality Date   • COLONOSCOPY  01/19/2015    diverticuli and hemorrhoids   • COLONOSCOPY  02/03/2009    diverticuli   • PACEMAKER IMPLANTATION     • PACEMAKER IMPLANTATION      SERIAL # [id number], model #W1DR01   • UPPER GASTROINTESTINAL ENDOSCOPY  02/03/2009    small hiatal hernia   • UPPER GASTROINTESTINAL ENDOSCOPY  01/19/2015    sliding small hiatal hernia       Family History   Problem Relation Age of Onset   • Cancer Mother         oral-mouth   • Coronary artery disease Mother    • Cancer Father         oral-mouth   • Cancer Brother         cholangiocarcinoma/ oral-mouth   • Leukemia Brother        Social History     Socioeconomic History   • Marital status:      Spouse name: Not on file   • Number of children: Not on file   • Years of education: Not on file   • Highest education level: Not on file   Tobacco Use   • Smoking status: Former Smoker   • Smokeless tobacco: Never Used   Substance and Sexual Activity   • Alcohol use: No      Frequency: Never   • Drug use: No   • Sexual activity: Defer       Current Outpatient Medications   Medication Sig Dispense Refill   • ALPRAZolam (XANAX) 0.5 MG tablet Take 1 tablet by mouth 3 (Three) Times a Day As Needed for Anxiety. 90 tablet 1   • azelastine (ASTEPRO) 0.15 % solution nasal spray 2 sprays into the nostril(s) as directed by provider Daily. 1 each 3   • bacitracin-polymyxin b ointment ophthalmic ointment Administer  to the right eye Every 4 (Four) Hours. 3.5 g 0   • diclofenac (VOLTAREN) 1 % gel gel Apply 4 g topically to the appropriate area as directed 4 (Four) Times a Day As Needed (pain and arthralgia). 100 g 3   • digoxin (LANOXIN) 125 MCG tablet Take 1 tablet by mouth Daily. 90 tablet 1   • dilTIAZem CD (CARDIZEM CD) 180 MG 24 hr capsule Take 1 capsule by mouth Daily. 90 capsule 1   • esomeprazole (nexIUM) 40 MG capsule Take 1 capsule by mouth Daily. 90 capsule 1   • estradiol (ESTRACE) 0.1 MG/GM vaginal cream Insert 1 g into the vagina 2 (Two) Times a Week.     • furosemide (LASIX) 20 MG tablet TAKE TWO TABLETS BY MOUTH EVERY MORNING AND TAKE ONE TABLET BY MOUTH EVERY EVENING 270 tablet 0   • lidocaine (XYLOCAINE) 5 % ointment Apply  topically to the appropriate area as directed 3 (Three) Times a Day. 150 g 2   • metoprolol succinate XL (TOPROL-XL) 50 MG 24 hr tablet TAKE ONE TABLET BY MOUTH DAILY 90 tablet 2   • olopatadine (PATANOL) 0.1 % ophthalmic solution Administer 1 drop to both eyes 2 (Two) Times a Day. 5 mL 1   • pravastatin (PRAVACHOL) 20 MG tablet Take 1 tablet by mouth Daily. 90 tablet 1   • PROBIOTIC PRODUCT PO Take  by mouth.     • tobramycin-dexamethasone (TOBRADEX) 0.3-0.1 % ophthalmic suspension      • warfarin (COUMADIN) 3 MG tablet TAKE ONE TABLET BY MOUTH DAILY 90 tablet 0     No current facility-administered medications for this visit.        Review of Systems   Constitutional: Negative for activity change, appetite change, fatigue, fever, unexpected weight gain and  "unexpected weight loss.   HENT: Negative for nosebleeds, rhinorrhea, trouble swallowing and voice change.    Eyes: Negative for visual disturbance.   Respiratory: Negative for cough, chest tightness, shortness of breath and wheezing.    Cardiovascular: Negative for chest pain, palpitations and leg swelling.   Gastrointestinal: Negative for abdominal pain, blood in stool, constipation, diarrhea, nausea, vomiting, GERD and indigestion.   Genitourinary: Negative for dysuria, frequency and hematuria.   Musculoskeletal: Negative for arthralgias, back pain and myalgias.   Skin: Negative for rash and bruise.   Neurological: Negative for dizziness, tremors, weakness, light-headedness, numbness, headache and memory problem.   Hematological: Negative for adenopathy. Does not bruise/bleed easily.   Psychiatric/Behavioral: Negative for sleep disturbance and depressed mood. The patient is not nervous/anxious.        Objective   /82 (BP Location: Left arm, Patient Position: Sitting, Cuff Size: Adult)   Pulse 78   Temp 98.4 °F (36.9 °C) (Temporal)   Ht 154.9 cm (60.98\")   Wt 49.9 kg (110 lb)   SpO2 98%   BMI 20.80 kg/m²     Physical Exam  Vitals signs and nursing note reviewed.   Constitutional:       General: She is not in acute distress.     Appearance: She is well-developed. She is not diaphoretic.   HENT:      Head: Normocephalic and atraumatic.      Right Ear: External ear normal.      Left Ear: External ear normal.      Nose: Nose normal.   Eyes:      Conjunctiva/sclera: Conjunctivae normal.      Pupils: Pupils are equal, round, and reactive to light.   Neck:      Musculoskeletal: Normal range of motion and neck supple.      Thyroid: No thyromegaly.      Trachea: No tracheal deviation.   Cardiovascular:      Rate and Rhythm: Normal rate and regular rhythm.      Heart sounds: Normal heart sounds. No murmur. No friction rub. No gallop.    Pulmonary:      Effort: Pulmonary effort is normal. No respiratory distress. "      Breath sounds: Normal breath sounds.   Abdominal:      General: Bowel sounds are normal.      Palpations: Abdomen is soft. There is no mass.      Tenderness: There is no abdominal tenderness. There is no guarding.   Musculoskeletal: Normal range of motion.   Lymphadenopathy:      Cervical: No cervical adenopathy.   Skin:     General: Skin is warm and dry.      Capillary Refill: Capillary refill takes less than 2 seconds.      Findings: No rash.   Neurological:      Mental Status: She is alert and oriented to person, place, and time.      Motor: No abnormal muscle tone.      Deep Tendon Reflexes: Reflexes normal.   Psychiatric:         Behavior: Behavior normal.         Thought Content: Thought content normal.         Judgment: Judgment normal.         Recent Results (from the past 2016 hour(s))   POC INR    Collection Time: 09/28/20  2:48 PM    Specimen: Blood   Result Value Ref Range    INR 1.70 (A) 2 - 3   POC INR    Collection Time: 10/12/20  2:51 PM    Specimen: Blood   Result Value Ref Range    INR 2.00 (A) 2 - 3   POC INR    Collection Time: 11/16/20  2:16 PM    Specimen: Blood   Result Value Ref Range    INR 2.20 (A) 2 - 3     Assessment/Plan   Diagnoses and all orders for this visit:    1. Anticoagulated on warfarin (Primary)    2. Atrial fibrillation, unspecified type (CMS/HCC)    3. Essential hypertension    Other orders  -     POC INR    Continue the current warfarin unchanged at this time and recheck in 4-6 weeks.  No other changes are needed at this time and hypertension is controlled.  Weight is steady and encouraged healthy diet.

## 2020-12-11 DIAGNOSIS — E78.5 HYPERLIPIDEMIA, UNSPECIFIED HYPERLIPIDEMIA TYPE: ICD-10-CM

## 2020-12-14 RX ORDER — PRAVASTATIN SODIUM 20 MG
TABLET ORAL
Qty: 90 TABLET | Refills: 0 | Status: SHIPPED | OUTPATIENT
Start: 2020-12-14 | End: 2021-03-18

## 2020-12-28 ENCOUNTER — OFFICE VISIT (OUTPATIENT)
Dept: FAMILY MEDICINE CLINIC | Facility: CLINIC | Age: 80
End: 2020-12-28

## 2020-12-28 VITALS
OXYGEN SATURATION: 98 % | SYSTOLIC BLOOD PRESSURE: 136 MMHG | BODY MASS INDEX: 21.22 KG/M2 | DIASTOLIC BLOOD PRESSURE: 78 MMHG | WEIGHT: 112.4 LBS | TEMPERATURE: 97.8 F | HEART RATE: 78 BPM | HEIGHT: 61 IN

## 2020-12-28 DIAGNOSIS — I10 ESSENTIAL HYPERTENSION: ICD-10-CM

## 2020-12-28 DIAGNOSIS — Z79.01 ANTICOAGULATED ON WARFARIN: Primary | ICD-10-CM

## 2020-12-28 DIAGNOSIS — I48.91 ATRIAL FIBRILLATION, UNSPECIFIED TYPE (HCC): ICD-10-CM

## 2020-12-28 DIAGNOSIS — F41.9 ANXIETY: ICD-10-CM

## 2020-12-28 LAB — INR PPP: 2.1 (ref 2–3)

## 2020-12-28 PROCEDURE — 99213 OFFICE O/P EST LOW 20 MIN: CPT | Performed by: INTERNAL MEDICINE

## 2020-12-28 PROCEDURE — 85610 PROTHROMBIN TIME: CPT | Performed by: INTERNAL MEDICINE

## 2020-12-28 PROCEDURE — 36416 COLLJ CAPILLARY BLOOD SPEC: CPT | Performed by: INTERNAL MEDICINE

## 2020-12-28 NOTE — PROGRESS NOTES
Subjective   Stacey Rojo is a 80 y.o. female.     Chief Complaint   Patient presents with   • Anticoagulation       History of Present Illness   Here for follow up INR evaluation.  Currently anticoagulated with warfarin for atrial fibrillation.  The patient is taking warfarin 3 mg T, Th, S, S and 1.5 mg M,W,F.  The current INR goal is 2-3.  The current INR is 2.1.  No significant interval events such as easy bleeding, bruising, fever, weakness or numbness.      Follow-up for cholesterol.  Currently, has been feeling well without any myalgias, muscle aches, weakness, numbness, chest pain, short of breath or other issues.  Currently, is adherent with medication regimen of pravastatin 20 mg a day and denies medication side effects. Labs done 8/20/2020.     Follow-up for hypertension.  Currently, has been feeling well and asymptomatic without any headaches, vision changes, cough, chest pain, shortness of breath, swelling, focal neurologic deficit, memory loss or syncope.  Has been taking the medications regularly and adherent with the regimen of diltiazem CD 180mg daily, lasix 20 mg BID and metoprolol XL 50 mg 3 tabs daily.  Denies medication side effects and no significant interval events. Labs done 8/20/2020.     Follow-up for anxiety.  Doing well currently and sleeping well with no recent panic attacks.  Using the alprazolam as needed but is usually taking only one a day and no noted side effects.  Does not need refill at this time.    The following portions of the patient's history were reviewed and updated as appropriate: allergies, current medications, past family history, past medical history, past social history, past surgical history and problem list.    Depression Screen:  PHQ-2/PHQ-9 Depression Screening 9/28/2020   Little interest or pleasure in doing things 0   Feeling down, depressed, or hopeless 0   Total Score 0       Past Medical History:   Diagnosis Date   • A-fib (CMS/HCC)    • Abnormality of lung     • Acquired insufficiency of aortic valve    • Antral gastritis    • Anxiety    • Asymptomatic postmenopausal status    • Atrial fibrillation (CMS/HCC)    • Body mass index (BMI) 24.0-24.9, adult    • Chest pain, atypical    • COPD (chronic obstructive pulmonary disease) (CMS/HCC)    • Diverticulosis of colon    • ASHLEE (generalized anxiety disorder)    • GERD (gastroesophageal reflux disease)    • Heart disease    • Hiatal hernia    • High risk medication use    • Hyperactive bowel sounds    • Hyperlipidemia    • Hypertension    • Insomnia    • Intermittent claudication (CMS/HCC)    • Internal hemorrhoids    • Lower extremity edema    • Mitral insufficiency    • KOKO (obstructive sleep apnea)    • Osteopenia    • Pacemaker    • Pain of left patella    • Pedal edema    • Pulmonary hypertension (CMS/HCC)    • SSS (sick sinus syndrome) (CMS/HCC)    • Tricuspid regurgitation    • Urine frequency    • Uterovaginal prolapse    • Wheezing        Past Surgical History:   Procedure Laterality Date   • COLONOSCOPY  01/19/2015    diverticuli and hemorrhoids   • COLONOSCOPY  02/03/2009    diverticuli   • PACEMAKER IMPLANTATION     • PACEMAKER IMPLANTATION      SERIAL # [id number], model #W1DR01   • UPPER GASTROINTESTINAL ENDOSCOPY  02/03/2009    small hiatal hernia   • UPPER GASTROINTESTINAL ENDOSCOPY  01/19/2015    sliding small hiatal hernia       Family History   Problem Relation Age of Onset   • Cancer Mother         oral-mouth   • Coronary artery disease Mother    • Cancer Father         oral-mouth   • Cancer Brother         cholangiocarcinoma/ oral-mouth   • Leukemia Brother        Social History     Socioeconomic History   • Marital status:      Spouse name: Not on file   • Number of children: Not on file   • Years of education: Not on file   • Highest education level: Not on file   Tobacco Use   • Smoking status: Former Smoker   • Smokeless tobacco: Never Used   Substance and Sexual Activity   • Alcohol use: No      Frequency: Never   • Drug use: No   • Sexual activity: Defer       Current Outpatient Medications   Medication Sig Dispense Refill   • ALPRAZolam (XANAX) 0.5 MG tablet Take 1 tablet by mouth 3 (Three) Times a Day As Needed for Anxiety. 90 tablet 1   • azelastine (ASTEPRO) 0.15 % solution nasal spray 2 sprays into the nostril(s) as directed by provider Daily. 1 each 3   • bacitracin-polymyxin b ointment ophthalmic ointment Administer  to the right eye Every 4 (Four) Hours. 3.5 g 0   • diclofenac (VOLTAREN) 1 % gel gel Apply 4 g topically to the appropriate area as directed 4 (Four) Times a Day As Needed (pain and arthralgia). 100 g 3   • digoxin (LANOXIN) 125 MCG tablet Take 1 tablet by mouth Daily. 90 tablet 1   • dilTIAZem CD (CARDIZEM CD) 180 MG 24 hr capsule Take 1 capsule by mouth Daily. 90 capsule 1   • esomeprazole (nexIUM) 40 MG capsule Take 1 capsule by mouth Daily. 90 capsule 1   • estradiol (ESTRACE) 0.1 MG/GM vaginal cream Insert 1 g into the vagina 2 (Two) Times a Week.     • furosemide (LASIX) 20 MG tablet TAKE TWO TABLETS BY MOUTH EVERY MORNING AND TAKE ONE TABLET BY MOUTH EVERY EVENING 270 tablet 0   • lidocaine (XYLOCAINE) 5 % ointment Apply  topically to the appropriate area as directed 3 (Three) Times a Day. 150 g 2   • metoprolol succinate XL (TOPROL-XL) 50 MG 24 hr tablet TAKE ONE TABLET BY MOUTH DAILY 90 tablet 2   • olopatadine (PATANOL) 0.1 % ophthalmic solution Administer 1 drop to both eyes 2 (Two) Times a Day. 5 mL 1   • pravastatin (PRAVACHOL) 20 MG tablet TAKE ONE TABLET BY MOUTH DAILY 90 tablet 0   • PROBIOTIC PRODUCT PO Take  by mouth.     • tobramycin-dexamethasone (TOBRADEX) 0.3-0.1 % ophthalmic suspension      • warfarin (COUMADIN) 3 MG tablet TAKE ONE TABLET BY MOUTH DAILY 90 tablet 0     No current facility-administered medications for this visit.        Review of Systems   Constitutional: Negative for activity change, appetite change, fatigue, fever, unexpected weight gain and  "unexpected weight loss.   HENT: Negative for nosebleeds, rhinorrhea, trouble swallowing and voice change.    Eyes: Negative for visual disturbance.   Respiratory: Negative for cough, chest tightness, shortness of breath and wheezing.    Cardiovascular: Negative for chest pain, palpitations and leg swelling.   Gastrointestinal: Negative for abdominal pain, blood in stool, constipation, diarrhea, nausea, vomiting, GERD and indigestion.   Genitourinary: Negative for dysuria, frequency and hematuria.   Musculoskeletal: Negative for arthralgias, back pain and myalgias.   Skin: Negative for rash and bruise.   Neurological: Negative for dizziness, tremors, weakness, light-headedness, numbness, headache and memory problem.   Hematological: Negative for adenopathy. Does not bruise/bleed easily.   Psychiatric/Behavioral: Negative for sleep disturbance and depressed mood. The patient is not nervous/anxious.        Objective   /78 (BP Location: Right arm, Patient Position: Sitting, Cuff Size: Adult)   Pulse 78   Temp 97.8 °F (36.6 °C) (Temporal)   Ht 154.9 cm (60.98\")   Wt 51 kg (112 lb 6.4 oz)   SpO2 98%   BMI 21.25 kg/m²     Physical Exam  Vitals signs and nursing note reviewed.   Constitutional:       General: She is not in acute distress.     Appearance: She is well-developed. She is not diaphoretic.   HENT:      Head: Normocephalic and atraumatic.      Right Ear: External ear normal.      Left Ear: External ear normal.      Nose: Nose normal.   Eyes:      Conjunctiva/sclera: Conjunctivae normal.      Pupils: Pupils are equal, round, and reactive to light.   Neck:      Musculoskeletal: Normal range of motion and neck supple.      Thyroid: No thyromegaly.      Trachea: No tracheal deviation.   Cardiovascular:      Rate and Rhythm: Normal rate and regular rhythm.      Heart sounds: Normal heart sounds. No murmur. No friction rub. No gallop.    Pulmonary:      Effort: Pulmonary effort is normal. No respiratory " distress.      Breath sounds: Normal breath sounds.   Abdominal:      General: Bowel sounds are normal.      Palpations: Abdomen is soft. There is no mass.      Tenderness: There is no abdominal tenderness. There is no guarding.   Musculoskeletal: Normal range of motion.   Lymphadenopathy:      Cervical: No cervical adenopathy.   Skin:     General: Skin is warm and dry.      Capillary Refill: Capillary refill takes less than 2 seconds.      Findings: No rash.   Neurological:      Mental Status: She is alert and oriented to person, place, and time.      Motor: No abnormal muscle tone.      Deep Tendon Reflexes: Reflexes normal.   Psychiatric:         Behavior: Behavior normal.         Thought Content: Thought content normal.         Judgment: Judgment normal.         Recent Results (from the past 2016 hour(s))   POC INR    Collection Time: 10/12/20  2:51 PM    Specimen: Blood   Result Value Ref Range    INR 2.00 (A) 2 - 3   POC INR    Collection Time: 11/16/20  2:16 PM    Specimen: Blood   Result Value Ref Range    INR 2.20 (A) 2 - 3   POC INR    Collection Time: 12/28/20  1:16 PM    Specimen: Blood   Result Value Ref Range    INR 2.10 (A) 2 - 3     Assessment/Plan   There are no diagnoses linked to this encounter.    Continue the current warfarin unchanged at this time and recheck in 4-6 weeks.  No other changes are needed at this time and hypertension is controlled.

## 2021-01-08 DIAGNOSIS — I48.20 CHRONIC ATRIAL FIBRILLATION (HCC): ICD-10-CM

## 2021-01-08 DIAGNOSIS — Z79.01 ANTICOAGULATED ON WARFARIN: ICD-10-CM

## 2021-01-11 RX ORDER — WARFARIN SODIUM 3 MG/1
TABLET ORAL
Qty: 90 TABLET | Refills: 0 | Status: SHIPPED | OUTPATIENT
Start: 2021-01-11 | End: 2021-04-12

## 2021-01-31 DIAGNOSIS — I10 ESSENTIAL HYPERTENSION: ICD-10-CM

## 2021-01-31 DIAGNOSIS — I48.91 ATRIAL FIBRILLATION, UNSPECIFIED TYPE (HCC): ICD-10-CM

## 2021-01-31 DIAGNOSIS — F41.9 ANXIETY: ICD-10-CM

## 2021-02-01 DIAGNOSIS — I10 ESSENTIAL HYPERTENSION: ICD-10-CM

## 2021-02-01 RX ORDER — DILTIAZEM HYDROCHLORIDE 180 MG/1
180 CAPSULE, COATED, EXTENDED RELEASE ORAL DAILY
Qty: 90 CAPSULE | Refills: 1 | Status: SHIPPED | OUTPATIENT
Start: 2021-02-01 | End: 2021-04-22 | Stop reason: SDUPTHER

## 2021-02-01 RX ORDER — FUROSEMIDE 20 MG/1
TABLET ORAL
Qty: 270 TABLET | Refills: 1 | Status: SHIPPED | OUTPATIENT
Start: 2021-02-01 | End: 2021-08-02

## 2021-02-01 RX ORDER — ALPRAZOLAM 0.5 MG/1
0.5 TABLET ORAL 3 TIMES DAILY PRN
Qty: 90 TABLET | Refills: 1 | Status: SHIPPED | OUTPATIENT
Start: 2021-02-01 | End: 2021-04-22 | Stop reason: SDUPTHER

## 2021-02-10 ENCOUNTER — TELEPHONE (OUTPATIENT)
Dept: FAMILY MEDICINE CLINIC | Facility: CLINIC | Age: 81
End: 2021-02-10

## 2021-02-10 NOTE — TELEPHONE ENCOUNTER
Hub can relay message: lvm for patient to call the office and confirm her appointment for tomorrow or change it to a video visit. The patient can also reschedule if she would rather come in on a different day.

## 2021-02-11 ENCOUNTER — OFFICE VISIT (OUTPATIENT)
Dept: FAMILY MEDICINE CLINIC | Facility: CLINIC | Age: 81
End: 2021-02-11

## 2021-02-11 VITALS
WEIGHT: 109 LBS | DIASTOLIC BLOOD PRESSURE: 68 MMHG | BODY MASS INDEX: 20.58 KG/M2 | HEIGHT: 61 IN | HEART RATE: 69 BPM | SYSTOLIC BLOOD PRESSURE: 118 MMHG | TEMPERATURE: 97.8 F | OXYGEN SATURATION: 98 %

## 2021-02-11 DIAGNOSIS — I48.91 ATRIAL FIBRILLATION, UNSPECIFIED TYPE (HCC): ICD-10-CM

## 2021-02-11 DIAGNOSIS — Z00.00 MEDICARE ANNUAL WELLNESS VISIT, SUBSEQUENT: Primary | ICD-10-CM

## 2021-02-11 DIAGNOSIS — Z79.01 ANTICOAGULATED ON WARFARIN: ICD-10-CM

## 2021-02-11 LAB — INR PPP: 2.4 (ref 2–3)

## 2021-02-11 PROCEDURE — G0439 PPPS, SUBSEQ VISIT: HCPCS | Performed by: INTERNAL MEDICINE

## 2021-02-11 PROCEDURE — 85610 PROTHROMBIN TIME: CPT | Performed by: INTERNAL MEDICINE

## 2021-02-11 PROCEDURE — 36416 COLLJ CAPILLARY BLOOD SPEC: CPT | Performed by: INTERNAL MEDICINE

## 2021-02-11 NOTE — PROGRESS NOTES
Subsequent Medicare Wellness Visit   The ABC's of the Annual Wellness Visit    Chief Complaint   Patient presents with   • Anticoagulation   • Annual Exam       HPI:  Stacey Rojo, -1940, is a 80 y.o. female who presents for a Subsequent Medicare Wellness Visit.    Here for follow up INR evaluation.  Currently anticoagulated with warfarin for atrial fibrillation.  The patient is taking warfarin 3 mg T, Th, S, S and 1.5 mg M,W,F.  The current INR goal is 2-3.  The current INR is 2.4.  No significant interval events such as easy bleeding, bruising, fever, weakness or numbness.      Follow-up for cholesterol.  Currently, has been feeling well without any myalgias, muscle aches, weakness, numbness, chest pain, short of breath or other issues.  Currently, is adherent with medication regimen of pravastatin 20 mg a day and denies medication side effects. Labs done 2020.     Follow-up for hypertension.  Currently, has been feeling well and asymptomatic without any headaches, vision changes, cough, chest pain, shortness of breath, swelling, focal neurologic deficit, memory loss or syncope.  Has been taking the medications regularly and adherent with the regimen of diltiazem CD 180mg daily, lasix 20 mg BID and metoprolol XL 50 mg 3 tabs daily.  Denies medication side effects and no significant interval events. Labs done 2020.     Follow-up for anxiety.  Doing well currently and sleeping well with no recent panic attacks.  Using the alprazolam as needed but is usually taking only one a day and no noted side effects.  Does not need refill at this time.    Recent Hospitalizations:  No hospitalization(s) within the last year..    Current Medical Providers:  Patient Care Team:  Serg Varela MD as PCP - General (Internal Medicine)  Charly Welsh MD as Consulting Physician (Cardiology)  Sivakumar Keller MD as Consulting Physician (Otolaryngology)  Rinku Mendoza MD as Consulting Physician  (Obstetrics and Gynecology)    Health Habits and Functional and Cognitive Screening and Depression Screening:  Functional & Cognitive Status 2/11/2021   Do you have difficulty preparing food and eating? No   Do you have difficulty bathing yourself, getting dressed or grooming yourself? No   Do you have difficulty using the toilet? No   Do you have difficulty moving around from place to place? No   Do you have trouble with steps or getting out of a bed or a chair? No   Current Diet Well Balanced Diet   Dental Exam Not up to date   Eye Exam Not up to date   Exercise (times per week) 0 times per week   Current Exercise Activities Include -   Do you need help using the phone?  No   Are you deaf or do you have serious difficulty hearing?  No   Do you need help with transportation? No   Do you need help shopping? No   Do you need help preparing meals?  No   Do you need help with housework?  No   Do you need help with laundry? No   Do you need help taking your medications? No   Do you need help managing money? No   Do you ever drive or ride in a car without wearing a seat belt? No   Have you felt unusual stress, anger or loneliness in the last month? Yes   Who do you live with? Child   If you need help, do you have trouble finding someone available to you? No   Have you been bothered in the last four weeks by sexual problems? No   Do you have difficulty concentrating, remembering or making decisions? No       Compared to one year ago, the patient feels her physical health is the same and her mental health is the same.    Depression Screen:  PHQ-2/PHQ-9 Depression Screening 2/11/2021   Little interest or pleasure in doing things 0   Feeling down, depressed, or hopeless 0   Trouble falling or staying asleep, or sleeping too much 0   Feeling tired or having little energy 0   Poor appetite or overeating 0   Feeling bad about yourself - or that you are a failure or have let yourself or your family down 0   Trouble concentrating  on things, such as reading the newspaper or watching television 0   Moving or speaking so slowly that other people could have noticed. Or the opposite - being so fidgety or restless that you have been moving around a lot more than usual 0   Thoughts that you would be better off dead, or of hurting yourself in some way 0   Total Score 0       Falls Risk Assessment:  PURVI Fall Risk Clinician Key Questions   Have you fallen in the past year?: No  Do you feel unsteady with walking?: No  Are you worried about falling?: No      Past Medical/Family/Social History:  The following portions of the patient's history were reviewed and updated as appropriate: allergies, current medications, past family history, past medical history, past social history, past surgical history and problem list.    Allergies   Allergen Reactions   • Rosuvastatin Calcium Myalgia         Current Outpatient Medications:   •  ALPRAZolam (XANAX) 0.5 MG tablet, Take 1 tablet by mouth 3 (Three) Times a Day As Needed for Anxiety., Disp: 90 tablet, Rfl: 1  •  azelastine (ASTEPRO) 0.15 % solution nasal spray, 2 sprays into the nostril(s) as directed by provider Daily., Disp: 1 each, Rfl: 3  •  bacitracin-polymyxin b ointment ophthalmic ointment, Administer  to the right eye Every 4 (Four) Hours., Disp: 3.5 g, Rfl: 0  •  diclofenac (VOLTAREN) 1 % gel gel, Apply 4 g topically to the appropriate area as directed 4 (Four) Times a Day As Needed (pain and arthralgia)., Disp: 100 g, Rfl: 3  •  digoxin (LANOXIN) 125 MCG tablet, Take 1 tablet by mouth Daily., Disp: 90 tablet, Rfl: 1  •  dilTIAZem CD (CARDIZEM CD) 180 MG 24 hr capsule, Take 1 capsule by mouth Daily., Disp: 90 capsule, Rfl: 1  •  esomeprazole (nexIUM) 40 MG capsule, Take 1 capsule by mouth Daily., Disp: 90 capsule, Rfl: 1  •  estradiol (ESTRACE) 0.1 MG/GM vaginal cream, Insert 1 g into the vagina 2 (Two) Times a Week., Disp: , Rfl:   •  furosemide (LASIX) 20 MG tablet, TAKE TWO TABLETS BY MOUTH EVERY  MORNING AND TAKE ONE TABLET BY MOUTH EVERY EVENING, Disp: 270 tablet, Rfl: 1  •  lidocaine (XYLOCAINE) 5 % ointment, Apply  topically to the appropriate area as directed 3 (Three) Times a Day., Disp: 150 g, Rfl: 2  •  metoprolol succinate XL (TOPROL-XL) 50 MG 24 hr tablet, TAKE ONE TABLET BY MOUTH DAILY, Disp: 90 tablet, Rfl: 2  •  olopatadine (PATANOL) 0.1 % ophthalmic solution, Administer 1 drop to both eyes 2 (Two) Times a Day., Disp: 5 mL, Rfl: 1  •  pravastatin (PRAVACHOL) 20 MG tablet, TAKE ONE TABLET BY MOUTH DAILY, Disp: 90 tablet, Rfl: 0  •  PROBIOTIC PRODUCT PO, Take  by mouth., Disp: , Rfl:   •  tobramycin-dexamethasone (TOBRADEX) 0.3-0.1 % ophthalmic suspension, , Disp: , Rfl:   •  warfarin (COUMADIN) 3 MG tablet, TAKE ONE TABLET BY MOUTH DAILY, Disp: 90 tablet, Rfl: 0    Aspirin use counseling: Does not need ASA (and currently is not on it)    Current medication list contains no high risk medications.  No harmful drug interactions have been identified.     Family History   Problem Relation Age of Onset   • Cancer Mother         oral-mouth   • Coronary artery disease Mother    • Cancer Father         oral-mouth   • Cancer Brother         cholangiocarcinoma/ oral-mouth   • Leukemia Brother        Social History     Tobacco Use   • Smoking status: Former Smoker   • Smokeless tobacco: Never Used   Substance Use Topics   • Alcohol use: No     Frequency: Never       Past Surgical History:   Procedure Laterality Date   • COLONOSCOPY  01/19/2015    diverticuli and hemorrhoids   • COLONOSCOPY  02/03/2009    diverticuli   • PACEMAKER IMPLANTATION     • PACEMAKER IMPLANTATION      SERIAL # [id number], model #[id number]   • UPPER GASTROINTESTINAL ENDOSCOPY  02/03/2009    small hiatal hernia   • UPPER GASTROINTESTINAL ENDOSCOPY  01/19/2015    sliding small hiatal hernia       Patient Active Problem List   Diagnosis   • Anticoagulated on warfarin   • A-fib (CMS/HCC)   • Hypertension   • Anxiety   • Hyperlipidemia   •  "GERD (gastroesophageal reflux disease)   • Heart disease   • Arthralgia of right ankle   • Atrophic vaginitis   • Cervical polyp   • Pacemaker   • Medicare annual wellness visit, subsequent   • Allergic conjunctivitis of both eyes   • Screening mammogram, encounter for   • Neuropathic pain of right ankle   • Allergic rhinitis   • ERRONEOUS ENCOUNTER--DISREGARD   • Hordeolum externum of right lower eyelid   • Eyelid abnormality   • Weight loss, abnormal       Review of Systems   Constitutional: Negative for activity change, appetite change, fatigue, fever, unexpected weight gain and unexpected weight loss.   HENT: Negative for nosebleeds, rhinorrhea, trouble swallowing and voice change.    Eyes: Negative for visual disturbance.   Respiratory: Negative for cough, chest tightness, shortness of breath and wheezing.    Cardiovascular: Negative for chest pain, palpitations and leg swelling.   Gastrointestinal: Negative for abdominal pain, blood in stool, constipation, diarrhea, nausea, vomiting, GERD and indigestion.   Genitourinary: Negative for dysuria, frequency and hematuria.   Musculoskeletal: Negative for arthralgias, back pain and myalgias.   Skin: Negative for rash and bruise.   Neurological: Negative for dizziness, tremors, weakness, light-headedness, numbness, headache and memory problem.   Hematological: Negative for adenopathy. Does not bruise/bleed easily.   Psychiatric/Behavioral: Negative for sleep disturbance and depressed mood. The patient is not nervous/anxious.        Objective     Vitals:    02/11/21 1315   BP: 118/68   BP Location: Right arm   Patient Position: Sitting   Cuff Size: Adult   Pulse: 69   Temp: 97.8 °F (36.6 °C)   TempSrc: Temporal   SpO2: 98%   Weight: 49.4 kg (109 lb)   Height: 154.9 cm (60.98\")       Patient's Body mass index is 20.61 kg/m². BMI is within normal parameters. No follow-up required..      No exam data present    The patient has no evidence of cognitve impairment. "     Physical Exam  Vitals signs and nursing note reviewed.   Constitutional:       General: She is not in acute distress.     Appearance: She is well-developed. She is not diaphoretic.   HENT:      Head: Normocephalic and atraumatic.      Right Ear: External ear normal.      Left Ear: External ear normal.      Nose: Nose normal.   Eyes:      Conjunctiva/sclera: Conjunctivae normal.      Pupils: Pupils are equal, round, and reactive to light.   Neck:      Musculoskeletal: Normal range of motion and neck supple.      Thyroid: No thyromegaly.      Trachea: No tracheal deviation.   Cardiovascular:      Rate and Rhythm: Normal rate and regular rhythm.      Heart sounds: Normal heart sounds. No murmur. No friction rub. No gallop.    Pulmonary:      Effort: Pulmonary effort is normal. No respiratory distress.      Breath sounds: Normal breath sounds.   Abdominal:      General: Bowel sounds are normal.      Palpations: Abdomen is soft. There is no mass.      Tenderness: There is no abdominal tenderness. There is no guarding.   Musculoskeletal: Normal range of motion.   Lymphadenopathy:      Cervical: No cervical adenopathy.   Skin:     General: Skin is warm and dry.      Capillary Refill: Capillary refill takes less than 2 seconds.      Findings: No rash.   Neurological:      Mental Status: She is alert and oriented to person, place, and time.      Motor: No abnormal muscle tone.      Deep Tendon Reflexes: Reflexes normal.   Psychiatric:         Behavior: Behavior normal.         Thought Content: Thought content normal.         Judgment: Judgment normal.         Recent Lab Results:  Lab Results   Component Value Date     (H) 08/20/2020     Lab Results   Component Value Date    TRIG 112 08/20/2020    HDL 49 08/20/2020    VLDL 22.4 08/20/2020       Assessment/Plan   Age-appropriate Screening Schedule:  Refer to the list below for future screening recommendations based on patient's age, sex and/or medical conditions.       Health Maintenance   Topic Date Due   • DXA SCAN  1940   • TDAP/TD VACCINES (1 - Tdap) 05/24/1961   • ZOSTER VACCINE (1 of 2) 05/24/1992   • LIPID PANEL  08/20/2021   • INFLUENZA VACCINE  Completed       Medicare Risks and Personalized Health Plan:  Advance Directive Discussion  Fall Risk  Glaucoma Risk  Immunizations Discussed/Encouraged (specific immunizations; adacel Tdap, Shingrix and COVID-19 series )  Osteoprorosis Risk      CMS-Preventive Services Quick Reference  Medicare Preventive Services Addressed:  Annual Wellness Visit (AWV)  Glaucoma screening (for individuals with diabetes mellitus, family history of glaucoma, -Americans (> or =) age 50, -Americans (> or =) age 65)    Advance Care Planning:  ACP discussion was held with the patient during this visit. Patient does not have an advance directive, information provided.    Diagnoses and all orders for this visit:    1. Medicare annual wellness visit, subsequent (Primary)    2. Anticoagulated on warfarin    3. Atrial fibrillation, unspecified type (CMS/[facility])    Other orders  -     POC INR      Great INR level.  Continue the current warfarin dosing unchanged.  Recheck INR in 4 to 6 weeks.    Reviewed history and annual wellness visit with patient during office time.  Medications reviewed as appropriate.  Discussed advanced directives and living will.  Patient has living will: Living will: no and information packet given to patient to complete at home and bring copy to office.  Discussed fall risk and precautions encourage removing throw rugs and using grab bars within the home and bathroom.  Labs to evaluate the blood sugars, kidney, liver, cholesterol for screening performed  8/20/2020.  Discussed flu shot recommended to get the high-dose influenza vaccine annually in the fall.  Prevnar-13 and pneumovax-23 up to date and appropriate.  Shingrix vaccination series and COVID19 series recommended.  Encourage follow-up with the eye  doctor on annual basis for glaucoma evaluation.  Discussed weight and encouraged exercise as tolerated while following a healthy diet.  Recommend to get annual mammograms.  Follow-up with gynecology and specialists as scheduled and discuss having a bone density testing.        An After Visit Summary and PPPS with all of these plans were given to the patient.      Follow Up:  No follow-ups on file.           · COVID-19 Precautions - Patient was compliant in wearing a mask. When I saw the patient, I used appropriate personal protective equipment (PPE) including mask and eye shield (standard procedure).  Additionally, I used gown and gloves if indicated.  Hand hygiene was completed before and after seeing the patient.  · Dictated utilizing Dragon Dictation

## 2021-03-03 DIAGNOSIS — K21.9 GASTROESOPHAGEAL REFLUX DISEASE WITHOUT ESOPHAGITIS: ICD-10-CM

## 2021-03-03 RX ORDER — ESOMEPRAZOLE MAGNESIUM 40 MG/1
40 CAPSULE, DELAYED RELEASE ORAL DAILY
Qty: 90 CAPSULE | Refills: 1 | Status: SHIPPED | OUTPATIENT
Start: 2021-03-03 | End: 2021-09-01

## 2021-03-18 ENCOUNTER — OFFICE VISIT (OUTPATIENT)
Dept: FAMILY MEDICINE CLINIC | Facility: CLINIC | Age: 81
End: 2021-03-18

## 2021-03-18 VITALS
TEMPERATURE: 97.3 F | HEART RATE: 64 BPM | HEIGHT: 61 IN | SYSTOLIC BLOOD PRESSURE: 112 MMHG | BODY MASS INDEX: 20.77 KG/M2 | DIASTOLIC BLOOD PRESSURE: 60 MMHG | WEIGHT: 110 LBS | OXYGEN SATURATION: 97 %

## 2021-03-18 DIAGNOSIS — Z79.01 ANTICOAGULATED ON WARFARIN: Primary | ICD-10-CM

## 2021-03-18 DIAGNOSIS — I10 ESSENTIAL HYPERTENSION: ICD-10-CM

## 2021-03-18 DIAGNOSIS — I48.91 ATRIAL FIBRILLATION, UNSPECIFIED TYPE (HCC): ICD-10-CM

## 2021-03-18 DIAGNOSIS — E78.5 HYPERLIPIDEMIA, UNSPECIFIED HYPERLIPIDEMIA TYPE: ICD-10-CM

## 2021-03-18 LAB — INR PPP: 2.7 (ref 2–3)

## 2021-03-18 PROCEDURE — 99213 OFFICE O/P EST LOW 20 MIN: CPT | Performed by: INTERNAL MEDICINE

## 2021-03-18 PROCEDURE — 36416 COLLJ CAPILLARY BLOOD SPEC: CPT | Performed by: INTERNAL MEDICINE

## 2021-03-18 PROCEDURE — 85610 PROTHROMBIN TIME: CPT | Performed by: INTERNAL MEDICINE

## 2021-03-18 RX ORDER — PRAVASTATIN SODIUM 20 MG
20 TABLET ORAL DAILY
Qty: 90 TABLET | Refills: 1 | Status: SHIPPED | OUTPATIENT
Start: 2021-03-18 | End: 2021-09-17

## 2021-03-18 NOTE — PROGRESS NOTES
Subjective   Stacey Rojo is a 80 y.o. female.     Chief Complaint   Patient presents with   • Anticoagulation       History of Present Illness   Here for follow up INR evaluation.  Currently anticoagulated with warfarin for atrial fibrillation.  The patient is taking warfarin 3 mg T, Th, S, S and 1.5 mg M,W,F.  The current INR goal is 2-3.  The current INR is 2.7.  No significant interval events such as easy bleeding, bruising, fever, weakness or numbness.      Follow-up for cholesterol.  Currently, has been feeling well without any myalgias, muscle aches, weakness, numbness, chest pain, short of breath or other issues.  Currently, is adherent with medication regimen of pravastatin 20 mg a day and denies medication side effects. Labs done 8/20/2020.     Follow-up for hypertension.  Currently, has been feeling well and asymptomatic without any headaches, vision changes, cough, chest pain, shortness of breath, swelling, focal neurologic deficit, memory loss or syncope.  Has been taking the medications regularly and adherent with the regimen of diltiazem CD 180mg daily, lasix 20 mg BID and metoprolol XL 50 mg 3 tabs daily.  Denies medication side effects and no significant interval events. Labs done 8/20/2020.     Follow-up for anxiety.  Doing well currently and sleeping well with no recent panic attacks.  Using the alprazolam as needed but is usually taking only one a day and no noted side effects.  Does not need refill at this time.    The following portions of the patient's history were reviewed and updated as appropriate: allergies, current medications, past family history, past medical history, past social history, past surgical history and problem list.    Depression Screen:  PHQ-2/PHQ-9 Depression Screening 2/11/2021   Little interest or pleasure in doing things 0   Feeling down, depressed, or hopeless 0   Trouble falling or staying asleep, or sleeping too much 0   Feeling tired or having little energy 0    Poor appetite or overeating 0   Feeling bad about yourself - or that you are a failure or have let yourself or your family down 0   Trouble concentrating on things, such as reading the newspaper or watching television 0   Moving or speaking so slowly that other people could have noticed. Or the opposite - being so fidgety or restless that you have been moving around a lot more than usual 0   Thoughts that you would be better off dead, or of hurting yourself in some way 0   Total Score 0       Past Medical History:   Diagnosis Date   • A-fib (CMS/HCC)    • Abnormality of lung    • Acquired insufficiency of aortic valve    • Antral gastritis    • Anxiety    • Asymptomatic postmenopausal status    • Atrial fibrillation (CMS/HCC)    • Body mass index (BMI) 24.0-24.9, adult    • Chest pain, atypical    • COPD (chronic obstructive pulmonary disease) (CMS/HCC)    • Diverticulosis of colon    • ASHLEE (generalized anxiety disorder)    • GERD (gastroesophageal reflux disease)    • Heart disease    • Hiatal hernia    • High risk medication use    • Hyperactive bowel sounds    • Hyperlipidemia    • Hypertension    • Insomnia    • Intermittent claudication (CMS/HCC)    • Internal hemorrhoids    • Lower extremity edema    • Mitral insufficiency    • KOKO (obstructive sleep apnea)    • Osteopenia    • Pacemaker    • Pain of left patella    • Pedal edema    • Pulmonary hypertension (CMS/HCC)    • SSS (sick sinus syndrome) (CMS/HCC)    • Tricuspid regurgitation    • Urine frequency    • Uterovaginal prolapse    • Wheezing        Past Surgical History:   Procedure Laterality Date   • COLONOSCOPY  01/19/2015    diverticuli and hemorrhoids   • COLONOSCOPY  02/03/2009    diverticuli   • PACEMAKER IMPLANTATION     • PACEMAKER IMPLANTATION      SERIAL # [id number], model #W1DR01   • UPPER GASTROINTESTINAL ENDOSCOPY  02/03/2009    small hiatal hernia   • UPPER GASTROINTESTINAL ENDOSCOPY  01/19/2015    sliding small hiatal hernia        Family History   Problem Relation Age of Onset   • Cancer Mother         oral-mouth   • Coronary artery disease Mother    • Cancer Father         oral-mouth   • Cancer Brother         cholangiocarcinoma/ oral-mouth   • Leukemia Brother        Social History     Socioeconomic History   • Marital status:      Spouse name: Not on file   • Number of children: Not on file   • Years of education: Not on file   • Highest education level: Not on file   Tobacco Use   • Smoking status: Former Smoker   • Smokeless tobacco: Never Used   Substance and Sexual Activity   • Alcohol use: No   • Drug use: No   • Sexual activity: Defer       Current Outpatient Medications   Medication Sig Dispense Refill   • ALPRAZolam (XANAX) 0.5 MG tablet Take 1 tablet by mouth 3 (Three) Times a Day As Needed for Anxiety. 90 tablet 1   • azelastine (ASTEPRO) 0.15 % solution nasal spray 2 sprays into the nostril(s) as directed by provider Daily. 1 each 3   • bacitracin-polymyxin b ointment ophthalmic ointment Administer  to the right eye Every 4 (Four) Hours. 3.5 g 0   • diclofenac (VOLTAREN) 1 % gel gel Apply 4 g topically to the appropriate area as directed 4 (Four) Times a Day As Needed (pain and arthralgia). 100 g 3   • digoxin (LANOXIN) 125 MCG tablet Take 1 tablet by mouth Daily. 90 tablet 1   • dilTIAZem CD (CARDIZEM CD) 180 MG 24 hr capsule Take 1 capsule by mouth Daily. 90 capsule 1   • esomeprazole (nexIUM) 40 MG capsule Take 1 capsule by mouth Daily. 90 capsule 1   • estradiol (ESTRACE) 0.1 MG/GM vaginal cream Insert 1 g into the vagina 2 (Two) Times a Week.     • furosemide (LASIX) 20 MG tablet TAKE TWO TABLETS BY MOUTH EVERY MORNING AND TAKE ONE TABLET BY MOUTH EVERY EVENING 270 tablet 1   • lidocaine (XYLOCAINE) 5 % ointment Apply  topically to the appropriate area as directed 3 (Three) Times a Day. 150 g 2   • metoprolol succinate XL (TOPROL-XL) 50 MG 24 hr tablet TAKE ONE TABLET BY MOUTH DAILY 90 tablet 2   • olopatadine  "(PATANOL) 0.1 % ophthalmic solution Administer 1 drop to both eyes 2 (Two) Times a Day. 5 mL 1   • PROBIOTIC PRODUCT PO Take  by mouth.     • tobramycin-dexamethasone (TOBRADEX) 0.3-0.1 % ophthalmic suspension      • warfarin (COUMADIN) 3 MG tablet TAKE ONE TABLET BY MOUTH DAILY 90 tablet 0   • pravastatin (PRAVACHOL) 20 MG tablet Take 1 tablet by mouth Daily. 90 tablet 1     No current facility-administered medications for this visit.       Review of Systems   Constitutional: Negative for activity change, appetite change, fatigue, fever, unexpected weight gain and unexpected weight loss.   HENT: Negative for nosebleeds, rhinorrhea, trouble swallowing and voice change.    Eyes: Negative for visual disturbance.   Respiratory: Negative for cough, chest tightness, shortness of breath and wheezing.    Cardiovascular: Negative for chest pain, palpitations and leg swelling.   Gastrointestinal: Negative for abdominal pain, blood in stool, constipation, diarrhea, nausea, vomiting, GERD and indigestion.   Genitourinary: Negative for dysuria, frequency and hematuria.   Musculoskeletal: Negative for arthralgias, back pain and myalgias.   Skin: Negative for rash and bruise.   Neurological: Negative for dizziness, tremors, weakness, light-headedness, numbness, headache and memory problem.   Hematological: Negative for adenopathy. Does not bruise/bleed easily.   Psychiatric/Behavioral: Negative for sleep disturbance and depressed mood. The patient is not nervous/anxious.        Objective   /60 (BP Location: Right arm, Patient Position: Sitting, Cuff Size: Adult)   Pulse 64   Temp 97.3 °F (36.3 °C) (Temporal)   Ht 154.9 cm (60.98\")   Wt 49.9 kg (110 lb)   SpO2 97%   BMI 20.80 kg/m²     Physical Exam  Vitals and nursing note reviewed.   Constitutional:       General: She is not in acute distress.     Appearance: She is well-developed. She is not diaphoretic.   HENT:      Head: Normocephalic and atraumatic.      Right " Ear: External ear normal.      Left Ear: External ear normal.      Nose: Nose normal.   Eyes:      Conjunctiva/sclera: Conjunctivae normal.      Pupils: Pupils are equal, round, and reactive to light.   Neck:      Thyroid: No thyromegaly.      Trachea: No tracheal deviation.   Cardiovascular:      Rate and Rhythm: Normal rate and regular rhythm.      Heart sounds: Normal heart sounds. No murmur heard.   No friction rub. No gallop.    Pulmonary:      Effort: Pulmonary effort is normal. No respiratory distress.      Breath sounds: Normal breath sounds.   Abdominal:      General: Bowel sounds are normal.      Palpations: Abdomen is soft. There is no mass.      Tenderness: There is no abdominal tenderness. There is no guarding.   Musculoskeletal:         General: Normal range of motion.      Cervical back: Normal range of motion and neck supple.   Lymphadenopathy:      Cervical: No cervical adenopathy.   Skin:     General: Skin is warm and dry.      Capillary Refill: Capillary refill takes less than 2 seconds.      Findings: No rash.   Neurological:      Mental Status: She is alert and oriented to person, place, and time.      Motor: No abnormal muscle tone.      Deep Tendon Reflexes: Reflexes normal.   Psychiatric:         Behavior: Behavior normal.         Thought Content: Thought content normal.         Judgment: Judgment normal.         Recent Results (from the past 2016 hour(s))   POC INR    Collection Time: 12/28/20  1:16 PM    Specimen: Blood   Result Value Ref Range    INR 2.10 (A) 2 - 3   POC INR    Collection Time: 02/11/21  1:20 PM    Specimen: Blood   Result Value Ref Range    INR 2.40 (A) 2 - 3   POC INR    Collection Time: 03/18/21  1:11 PM    Specimen: Blood   Result Value Ref Range    INR 2.70 (A) 2 - 3     Assessment/Plan   Diagnoses and all orders for this visit:    1. Anticoagulated on warfarin (Primary)    2. Atrial fibrillation, unspecified type (CMS/HCC)    3. Essential hypertension    Other  orders  -     POC INR      Continue the current warfarin dosage unchanged and recheck in 4-6 weeks unless other problems.  Watch for signs of bleeding or unusual bleeding.         · COVID-19 Precautions - Patient was compliant in wearing a mask. When I saw the patient, I used appropriate personal protective equipment (PPE) including mask and eye shield (standard procedure).  Additionally, I used gown and gloves if indicated.  Hand hygiene was completed before and after seeing the patient.  · Dictated utilizing Dragon Dictation

## 2021-04-10 DIAGNOSIS — I48.20 CHRONIC ATRIAL FIBRILLATION (HCC): ICD-10-CM

## 2021-04-10 DIAGNOSIS — Z79.01 ANTICOAGULATED ON WARFARIN: ICD-10-CM

## 2021-04-12 RX ORDER — WARFARIN SODIUM 3 MG/1
TABLET ORAL
Qty: 90 TABLET | Refills: 0 | Status: SHIPPED | OUTPATIENT
Start: 2021-04-12 | End: 2021-07-09

## 2021-04-20 RX ORDER — DIGOXIN 125 MCG
125 TABLET ORAL DAILY
Qty: 90 TABLET | Refills: 1 | Status: SHIPPED | OUTPATIENT
Start: 2021-04-20 | End: 2021-10-14 | Stop reason: SDUPTHER

## 2021-04-22 DIAGNOSIS — I10 ESSENTIAL HYPERTENSION: ICD-10-CM

## 2021-04-22 DIAGNOSIS — I48.91 ATRIAL FIBRILLATION, UNSPECIFIED TYPE (HCC): ICD-10-CM

## 2021-04-22 DIAGNOSIS — F41.9 ANXIETY: ICD-10-CM

## 2021-04-26 RX ORDER — ALPRAZOLAM 0.5 MG/1
0.5 TABLET ORAL 3 TIMES DAILY PRN
Qty: 90 TABLET | Refills: 0 | Status: SHIPPED | OUTPATIENT
Start: 2021-04-26 | End: 2021-06-30 | Stop reason: SDUPTHER

## 2021-04-26 RX ORDER — DILTIAZEM HYDROCHLORIDE 180 MG/1
180 CAPSULE, COATED, EXTENDED RELEASE ORAL DAILY
Qty: 90 CAPSULE | Refills: 1 | Status: SHIPPED | OUTPATIENT
Start: 2021-04-26 | End: 2021-07-28 | Stop reason: SDUPTHER

## 2021-04-29 ENCOUNTER — OFFICE VISIT (OUTPATIENT)
Dept: FAMILY MEDICINE CLINIC | Facility: CLINIC | Age: 81
End: 2021-04-29

## 2021-04-29 VITALS
HEART RATE: 74 BPM | DIASTOLIC BLOOD PRESSURE: 78 MMHG | HEIGHT: 61 IN | WEIGHT: 112 LBS | TEMPERATURE: 97.7 F | SYSTOLIC BLOOD PRESSURE: 104 MMHG | BODY MASS INDEX: 21.14 KG/M2 | OXYGEN SATURATION: 98 %

## 2021-04-29 DIAGNOSIS — I48.91 ATRIAL FIBRILLATION, UNSPECIFIED TYPE (HCC): ICD-10-CM

## 2021-04-29 DIAGNOSIS — Z79.01 ANTICOAGULATED ON WARFARIN: Primary | ICD-10-CM

## 2021-04-29 DIAGNOSIS — I10 ESSENTIAL HYPERTENSION: ICD-10-CM

## 2021-04-29 LAB — INR PPP: 2.6 (ref 2–3)

## 2021-04-29 PROCEDURE — 99213 OFFICE O/P EST LOW 20 MIN: CPT | Performed by: INTERNAL MEDICINE

## 2021-04-29 PROCEDURE — 85610 PROTHROMBIN TIME: CPT | Performed by: INTERNAL MEDICINE

## 2021-04-29 PROCEDURE — 36416 COLLJ CAPILLARY BLOOD SPEC: CPT | Performed by: INTERNAL MEDICINE

## 2021-04-29 NOTE — PROGRESS NOTES
Subjective   Stacey Rojo is a 80 y.o. female.     Chief Complaint   Patient presents with   • Anticoagulation       History of Present Illness   Here for follow up INR evaluation.  Currently anticoagulated with warfarin for atrial fibrillation.  The patient is taking warfarin 3 mg T, Th, S, S and 1.5 mg M,W,F.  The current INR goal is 2-3.  The current INR is 2.6.  No significant interval events such as easy bleeding, bruising, fever, weakness or numbness.      History of cholesterol.  Currently, has been feeling well without any myalgias, muscle aches, weakness, numbness, chest pain, short of breath or other issues.  Currently, is adherent with medication regimen of pravastatin 20 mg a day and denies medication side effects. Labs done 8/20/2020.     History of hypertension.  Currently, has been feeling well and asymptomatic without any headaches, vision changes, cough, chest pain, shortness of breath, swelling, focal neurologic deficit, memory loss or syncope.  Has been taking the medications regularly and adherent with the regimen of diltiazem CD 180mg daily, lasix 20 mg BID and metoprolol XL 50 mg 3 tabs daily.  Denies medication side effects and no significant interval events. Labs done 8/20/2020.     History of anxiety.  Doing well currently and sleeping well with no recent panic attacks.  Using the alprazolam as needed but is usually taking only one a day and no noted side effects.  Does not need refill at this time.       The following portions of the patient's history were reviewed and updated as appropriate: allergies, current medications, past family history, past medical history, past social history, past surgical history and problem list.    Depression Screen:  PHQ-2/PHQ-9 Depression Screening 2/11/2021   Little interest or pleasure in doing things 0   Feeling down, depressed, or hopeless 0   Trouble falling or staying asleep, or sleeping too much 0   Feeling tired or having little energy 0   Poor  appetite or overeating 0   Feeling bad about yourself - or that you are a failure or have let yourself or your family down 0   Trouble concentrating on things, such as reading the newspaper or watching television 0   Moving or speaking so slowly that other people could have noticed. Or the opposite - being so fidgety or restless that you have been moving around a lot more than usual 0   Thoughts that you would be better off dead, or of hurting yourself in some way 0   Total Score 0       Past Medical History:   Diagnosis Date   • A-fib (CMS/HCC)    • Abnormality of lung    • Acquired insufficiency of aortic valve    • Antral gastritis    • Anxiety    • Asymptomatic postmenopausal status    • Atrial fibrillation (CMS/HCC)    • Body mass index (BMI) 24.0-24.9, adult    • Chest pain, atypical    • COPD (chronic obstructive pulmonary disease) (CMS/HCC)    • Diverticulosis of colon    • ASHLEE (generalized anxiety disorder)    • GERD (gastroesophageal reflux disease)    • Heart disease    • Hiatal hernia    • High risk medication use    • Hyperactive bowel sounds    • Hyperlipidemia    • Hypertension    • Insomnia    • Intermittent claudication (CMS/HCC)    • Internal hemorrhoids    • Lower extremity edema    • Mitral insufficiency    • KOKO (obstructive sleep apnea)    • Osteopenia    • Pacemaker    • Pain of left patella    • Pedal edema    • Pulmonary hypertension (CMS/HCC)    • SSS (sick sinus syndrome) (CMS/HCC)    • Tricuspid regurgitation    • Urine frequency    • Uterovaginal prolapse    • Wheezing        Past Surgical History:   Procedure Laterality Date   • COLONOSCOPY  01/19/2015    diverticuli and hemorrhoids   • COLONOSCOPY  02/03/2009    diverticuli   • PACEMAKER IMPLANTATION     • PACEMAKER IMPLANTATION      SERIAL # [id number], model #W1DR01   • UPPER GASTROINTESTINAL ENDOSCOPY  02/03/2009    small hiatal hernia   • UPPER GASTROINTESTINAL ENDOSCOPY  01/19/2015    sliding small hiatal hernia       Family  History   Problem Relation Age of Onset   • Cancer Mother         oral-mouth   • Coronary artery disease Mother    • Cancer Father         oral-mouth   • Cancer Brother         cholangiocarcinoma/ oral-mouth   • Leukemia Brother        Social History     Socioeconomic History   • Marital status:      Spouse name: Not on file   • Number of children: Not on file   • Years of education: Not on file   • Highest education level: Not on file   Tobacco Use   • Smoking status: Former Smoker   • Smokeless tobacco: Never Used   Substance and Sexual Activity   • Alcohol use: No   • Drug use: No   • Sexual activity: Defer       Current Outpatient Medications   Medication Sig Dispense Refill   • ALPRAZolam (XANAX) 0.5 MG tablet Take 1 tablet by mouth 3 (Three) Times a Day As Needed for Anxiety. 90 tablet 0   • azelastine (ASTEPRO) 0.15 % solution nasal spray 2 sprays into the nostril(s) as directed by provider Daily. 1 each 3   • bacitracin-polymyxin b ointment ophthalmic ointment Administer  to the right eye Every 4 (Four) Hours. 3.5 g 0   • diclofenac (VOLTAREN) 1 % gel gel Apply 4 g topically to the appropriate area as directed 4 (Four) Times a Day As Needed (pain and arthralgia). 100 g 3   • digoxin (LANOXIN) 125 MCG tablet Take 1 tablet by mouth Daily. 90 tablet 1   • dilTIAZem CD (CARDIZEM CD) 180 MG 24 hr capsule Take 1 capsule by mouth Daily. 90 capsule 1   • esomeprazole (nexIUM) 40 MG capsule Take 1 capsule by mouth Daily. 90 capsule 1   • estradiol (ESTRACE) 0.1 MG/GM vaginal cream Insert 1 g into the vagina 2 (Two) Times a Week.     • furosemide (LASIX) 20 MG tablet TAKE TWO TABLETS BY MOUTH EVERY MORNING AND TAKE ONE TABLET BY MOUTH EVERY EVENING 270 tablet 1   • lidocaine (XYLOCAINE) 5 % ointment Apply  topically to the appropriate area as directed 3 (Three) Times a Day. 150 g 2   • metoprolol succinate XL (TOPROL-XL) 50 MG 24 hr tablet TAKE ONE TABLET BY MOUTH DAILY 90 tablet 2   • olopatadine (PATANOL)  "0.1 % ophthalmic solution Administer 1 drop to both eyes 2 (Two) Times a Day. 5 mL 1   • pravastatin (PRAVACHOL) 20 MG tablet Take 1 tablet by mouth Daily. 90 tablet 1   • PROBIOTIC PRODUCT PO Take  by mouth.     • tobramycin-dexamethasone (TOBRADEX) 0.3-0.1 % ophthalmic suspension      • warfarin (COUMADIN) 3 MG tablet TAKE ONE TABLET BY MOUTH DAILY 90 tablet 0     No current facility-administered medications for this visit.       Review of Systems   Constitutional: Negative for activity change, appetite change, fatigue, fever, unexpected weight gain and unexpected weight loss.   HENT: Negative for nosebleeds, rhinorrhea, trouble swallowing and voice change.    Eyes: Negative for visual disturbance.   Respiratory: Negative for cough, chest tightness, shortness of breath and wheezing.    Cardiovascular: Negative for chest pain, palpitations and leg swelling.   Gastrointestinal: Negative for abdominal pain, blood in stool, constipation, diarrhea, nausea, vomiting, GERD and indigestion.   Genitourinary: Negative for dysuria, frequency and hematuria.   Musculoskeletal: Negative for arthralgias, back pain and myalgias.   Skin: Negative for rash and bruise.   Neurological: Negative for dizziness, tremors, weakness, light-headedness, numbness, headache and memory problem.   Hematological: Negative for adenopathy. Does not bruise/bleed easily.   Psychiatric/Behavioral: Negative for sleep disturbance and depressed mood. The patient is not nervous/anxious.        Objective   /78 (BP Location: Left arm, Patient Position: Sitting, Cuff Size: Adult)   Pulse 74   Temp 97.7 °F (36.5 °C) (Temporal)   Ht 154.9 cm (60.98\")   Wt 50.8 kg (112 lb)   SpO2 98%   BMI 21.17 kg/m²     Physical Exam  Vitals and nursing note reviewed.   Constitutional:       General: She is not in acute distress.     Appearance: She is well-developed. She is not diaphoretic.   HENT:      Head: Normocephalic and atraumatic.      Right Ear: External " ear normal.      Left Ear: External ear normal.      Nose: Nose normal.   Eyes:      Conjunctiva/sclera: Conjunctivae normal.      Pupils: Pupils are equal, round, and reactive to light.   Neck:      Thyroid: No thyromegaly.      Trachea: No tracheal deviation.   Cardiovascular:      Rate and Rhythm: Normal rate and regular rhythm.      Heart sounds: Normal heart sounds. No murmur heard.   No friction rub. No gallop.    Pulmonary:      Effort: Pulmonary effort is normal. No respiratory distress.      Breath sounds: Normal breath sounds.   Abdominal:      General: Bowel sounds are normal.      Palpations: Abdomen is soft. There is no mass.      Tenderness: There is no abdominal tenderness. There is no guarding.   Musculoskeletal:         General: Normal range of motion.      Cervical back: Normal range of motion and neck supple.   Lymphadenopathy:      Cervical: No cervical adenopathy.   Skin:     General: Skin is warm and dry.      Capillary Refill: Capillary refill takes less than 2 seconds.      Findings: No rash.   Neurological:      Mental Status: She is alert and oriented to person, place, and time.      Motor: No abnormal muscle tone.      Deep Tendon Reflexes: Reflexes normal.   Psychiatric:         Behavior: Behavior normal.         Thought Content: Thought content normal.         Judgment: Judgment normal.         Recent Results (from the past 2016 hour(s))   POC INR    Collection Time: 02/11/21  1:20 PM    Specimen: Blood   Result Value Ref Range    INR 2.40 (A) 2 - 3   POC INR    Collection Time: 03/18/21  1:11 PM    Specimen: Blood   Result Value Ref Range    INR 2.70 (A) 2 - 3   POC INR    Collection Time: 04/29/21  1:11 PM    Specimen: Blood   Result Value Ref Range    INR 2.60 (A) 2 - 3     Assessment/Plan   Diagnoses and all orders for this visit:    1. Anticoagulated on warfarin (Primary)    2. Atrial fibrillation, unspecified type (CMS/HCC)    3. Essential hypertension    Other orders  -     POC  INR    Continue the current warfarin dose unchanged.  Will recheck in 1 month.  Hypertension controlled no changes are needed.           · COVID-19 Precautions - Patient was compliant in wearing a mask. When I saw the patient, I used appropriate personal protective equipment (PPE) including mask and eye shield (standard procedure).  Additionally, I used gown and gloves if indicated.  Hand hygiene was completed before and after seeing the patient.  · Dictated utilizing Dragon Dictation

## 2021-05-26 ENCOUNTER — CLINICAL SUPPORT (OUTPATIENT)
Dept: FAMILY MEDICINE CLINIC | Facility: CLINIC | Age: 81
End: 2021-05-26

## 2021-05-26 DIAGNOSIS — Z79.01 ANTICOAGULATED ON WARFARIN: Primary | ICD-10-CM

## 2021-05-26 LAB — INR PPP: 2.5 (ref 2–3)

## 2021-05-26 PROCEDURE — 85610 PROTHROMBIN TIME: CPT | Performed by: INTERNAL MEDICINE

## 2021-05-26 PROCEDURE — 36416 COLLJ CAPILLARY BLOOD SPEC: CPT | Performed by: INTERNAL MEDICINE

## 2021-06-03 RX ORDER — METOPROLOL SUCCINATE 50 MG/1
TABLET, EXTENDED RELEASE ORAL
Qty: 90 TABLET | Refills: 1 | Status: SHIPPED | OUTPATIENT
Start: 2021-06-03 | End: 2021-11-26 | Stop reason: SDUPTHER

## 2021-06-17 DIAGNOSIS — M79.2 NEUROPATHIC PAIN OF RIGHT ANKLE: ICD-10-CM

## 2021-06-17 DIAGNOSIS — M25.571 ARTHRALGIA OF RIGHT ANKLE: ICD-10-CM

## 2021-06-17 RX ORDER — LIDOCAINE 50 MG/G
OINTMENT TOPICAL
Qty: 35.44 EACH | Refills: 1 | Status: SHIPPED | OUTPATIENT
Start: 2021-06-17 | End: 2022-11-17

## 2021-06-17 RX ORDER — LIDOCAINE 50 MG/G
OINTMENT TOPICAL 3 TIMES DAILY
Qty: 150 G | Refills: 2 | Status: CANCELLED | OUTPATIENT
Start: 2021-06-17

## 2021-06-24 ENCOUNTER — ANTICOAGULATION VISIT (OUTPATIENT)
Dept: FAMILY MEDICINE CLINIC | Facility: CLINIC | Age: 81
End: 2021-06-24

## 2021-06-24 ENCOUNTER — OFFICE VISIT (OUTPATIENT)
Dept: FAMILY MEDICINE CLINIC | Facility: CLINIC | Age: 81
End: 2021-06-24

## 2021-06-24 VITALS
HEART RATE: 69 BPM | DIASTOLIC BLOOD PRESSURE: 60 MMHG | BODY MASS INDEX: 20.01 KG/M2 | SYSTOLIC BLOOD PRESSURE: 118 MMHG | TEMPERATURE: 98.2 F | HEIGHT: 61 IN | OXYGEN SATURATION: 98 % | WEIGHT: 106 LBS

## 2021-06-24 DIAGNOSIS — Z79.01 ANTICOAGULATED ON WARFARIN: Primary | ICD-10-CM

## 2021-06-24 DIAGNOSIS — I10 ESSENTIAL HYPERTENSION: ICD-10-CM

## 2021-06-24 DIAGNOSIS — I48.91 ATRIAL FIBRILLATION, UNSPECIFIED TYPE (HCC): ICD-10-CM

## 2021-06-24 DIAGNOSIS — E78.5 HYPERLIPIDEMIA, UNSPECIFIED HYPERLIPIDEMIA TYPE: ICD-10-CM

## 2021-06-24 LAB
INR PPP: 2.5 (ref 2–3)
INR PPP: 2.5 (ref 2–3)

## 2021-06-24 PROCEDURE — 99213 OFFICE O/P EST LOW 20 MIN: CPT | Performed by: INTERNAL MEDICINE

## 2021-06-24 PROCEDURE — 36416 COLLJ CAPILLARY BLOOD SPEC: CPT | Performed by: INTERNAL MEDICINE

## 2021-06-24 PROCEDURE — 85610 PROTHROMBIN TIME: CPT | Performed by: INTERNAL MEDICINE

## 2021-06-24 RX ORDER — ERYTHROMYCIN 5 MG/G
OINTMENT OPHTHALMIC
COMMUNITY
Start: 2021-05-20 | End: 2022-11-17

## 2021-06-24 NOTE — PROGRESS NOTES
Subjective   Stacey Rojo is a 81 y.o. female.     Chief Complaint   Patient presents with   • Anticoagulation       History of Present Illness   Here for follow up INR evaluation.  Currently anticoagulated with warfarin for atrial fibrillation.  The patient is taking warfarin 3 mg T, Th, S, S and 1.5 mg M,W,F.  The current INR goal is 2-3.  The current INR is 2.5.  No significant interval events such as easy bleeding, bruising, fever, weakness or numbness.      History of cholesterol.  Currently, has been feeling well without any myalgias, muscle aches, weakness, numbness, chest pain, short of breath or other issues.  Currently, is adherent with medication regimen of pravastatin 20 mg a day and denies medication side effects. Labs done 8/20/2020.     History of hypertension.  Currently, has been feeling well and asymptomatic without any headaches, vision changes, cough, chest pain, shortness of breath, swelling, focal neurologic deficit, memory loss or syncope.  Has been taking the medications regularly and adherent with the regimen of diltiazem CD 180mg daily, lasix 20 mg BID and metoprolol XL 50 mg 3 tabs daily.  Denies medication side effects and no significant interval events. Labs done 8/20/2020.     History of anxiety.  Doing well currently and sleeping well with no recent panic attacks.  Using the alprazolam as needed but is usually taking only one a day and no noted side effects.  Does not need refill at this time.    The following portions of the patient's history were reviewed and updated as appropriate: allergies, current medications, past family history, past medical history, past social history, past surgical history and problem list.    Depression Screen:  PHQ-2/PHQ-9 Depression Screening 2/11/2021   Little interest or pleasure in doing things 0   Feeling down, depressed, or hopeless 0   Trouble falling or staying asleep, or sleeping too much 0   Feeling tired or having little energy 0   Poor  appetite or overeating 0   Feeling bad about yourself - or that you are a failure or have let yourself or your family down 0   Trouble concentrating on things, such as reading the newspaper or watching television 0   Moving or speaking so slowly that other people could have noticed. Or the opposite - being so fidgety or restless that you have been moving around a lot more than usual 0   Thoughts that you would be better off dead, or of hurting yourself in some way 0   Total Score 0       Past Medical History:   Diagnosis Date   • A-fib (CMS/HCC)    • Abnormality of lung    • Acquired insufficiency of aortic valve    • Antral gastritis    • Anxiety    • Asymptomatic postmenopausal status    • Atrial fibrillation (CMS/HCC)    • Body mass index (BMI) 24.0-24.9, adult    • Chest pain, atypical    • COPD (chronic obstructive pulmonary disease) (CMS/HCC)    • Diverticulosis of colon    • ASHLEE (generalized anxiety disorder)    • GERD (gastroesophageal reflux disease)    • Heart disease    • Hiatal hernia    • High risk medication use    • Hyperactive bowel sounds    • Hyperlipidemia    • Hypertension    • Insomnia    • Intermittent claudication (CMS/HCC)    • Internal hemorrhoids    • Lower extremity edema    • Mitral insufficiency    • KOKO (obstructive sleep apnea)    • Osteopenia    • Pacemaker    • Pain of left patella    • Pedal edema    • Pulmonary hypertension (CMS/HCC)    • SSS (sick sinus syndrome) (CMS/HCC)    • Tricuspid regurgitation    • Urine frequency    • Uterovaginal prolapse    • Wheezing        Past Surgical History:   Procedure Laterality Date   • COLONOSCOPY  01/19/2015    diverticuli and hemorrhoids   • COLONOSCOPY  02/03/2009    diverticuli   • PACEMAKER IMPLANTATION     • PACEMAKER IMPLANTATION      SERIAL # [id number], model #[id number]   • UPPER GASTROINTESTINAL ENDOSCOPY  02/03/2009    small hiatal hernia   • UPPER GASTROINTESTINAL ENDOSCOPY  01/19/2015    sliding small hiatal hernia       Family  History   Problem Relation Age of Onset   • Cancer Mother         oral-mouth   • Coronary artery disease Mother    • Cancer Father         oral-mouth   • Cancer Brother         cholangiocarcinoma/ oral-mouth   • Leukemia Brother        Social History     Socioeconomic History   • Marital status:      Spouse name: Not on file   • Number of children: Not on file   • Years of education: Not on file   • Highest education level: Not on file   Tobacco Use   • Smoking status: Former Smoker   • Smokeless tobacco: Never Used   Substance and Sexual Activity   • Alcohol use: No   • Drug use: No   • Sexual activity: Defer       Current Outpatient Medications   Medication Sig Dispense Refill   • ALPRAZolam (XANAX) 0.5 MG tablet Take 1 tablet by mouth 3 (Three) Times a Day As Needed for Anxiety. 90 tablet 0   • digoxin (LANOXIN) 125 MCG tablet Take 1 tablet by mouth Daily. 90 tablet 1   • dilTIAZem CD (CARDIZEM CD) 180 MG 24 hr capsule Take 1 capsule by mouth Daily. 90 capsule 1   • erythromycin (ROMYCIN) 5 MG/GM ophthalmic ointment      • esomeprazole (nexIUM) 40 MG capsule Take 1 capsule by mouth Daily. 90 capsule 1   • estradiol (ESTRACE) 0.1 MG/GM vaginal cream Insert 1 g into the vagina 2 (Two) Times a Week.     • furosemide (LASIX) 20 MG tablet TAKE TWO TABLETS BY MOUTH EVERY MORNING AND TAKE ONE TABLET BY MOUTH EVERY EVENING 270 tablet 1   • lidocaine (XYLOCAINE) 5 % ointment APPLY TOPICALLY TO THE APPROPRIATE AREA AS DIRECTED THREE TIMES DAILY 35.44 each 1   • metoprolol succinate XL (TOPROL-XL) 50 MG 24 hr tablet TAKE ONE TABLET BY MOUTH DAILY 90 tablet 1   • pravastatin (PRAVACHOL) 20 MG tablet Take 1 tablet by mouth Daily. 90 tablet 1   • PROBIOTIC PRODUCT PO Take  by mouth.     • warfarin (COUMADIN) 3 MG tablet TAKE ONE TABLET BY MOUTH DAILY 90 tablet 0   • azelastine (ASTEPRO) 0.15 % solution nasal spray 2 sprays into the nostril(s) as directed by provider Daily. 1 each 3   • diclofenac (VOLTAREN) 1 % gel gel  "Apply 4 g topically to the appropriate area as directed 4 (Four) Times a Day As Needed (pain and arthralgia). 100 g 3   • olopatadine (PATANOL) 0.1 % ophthalmic solution Administer 1 drop to both eyes 2 (Two) Times a Day. 5 mL 1   • tobramycin-dexamethasone (TOBRADEX) 0.3-0.1 % ophthalmic suspension        No current facility-administered medications for this visit.       Review of Systems   Constitutional: Negative for activity change, appetite change, fatigue, fever, unexpected weight gain and unexpected weight loss.   HENT: Negative for nosebleeds, rhinorrhea, trouble swallowing and voice change.    Eyes: Negative for visual disturbance.   Respiratory: Negative for cough, chest tightness, shortness of breath and wheezing.    Cardiovascular: Negative for chest pain, palpitations and leg swelling.   Gastrointestinal: Negative for abdominal pain, blood in stool, constipation, diarrhea, nausea, vomiting, GERD and indigestion.   Genitourinary: Negative for dysuria, frequency and hematuria.   Musculoskeletal: Positive for arthralgias. Negative for back pain and myalgias.   Skin: Negative for rash and bruise.   Neurological: Negative for dizziness, tremors, weakness, light-headedness, numbness, headache and memory problem.   Hematological: Negative for adenopathy. Does not bruise/bleed easily.   Psychiatric/Behavioral: Negative for sleep disturbance and depressed mood. The patient is not nervous/anxious.        Objective   /60 (BP Location: Left arm, Patient Position: Sitting, Cuff Size: Adult)   Pulse 69   Temp 98.2 °F (36.8 °C) (Infrared)   Ht 154.9 cm (60.98\")   Wt 48.1 kg (106 lb)   SpO2 98%   BMI 20.04 kg/m²     Physical Exam  Vitals and nursing note reviewed.   Constitutional:       General: She is not in acute distress.     Appearance: She is well-developed. She is not diaphoretic.   HENT:      Head: Normocephalic and atraumatic.      Right Ear: External ear normal.      Left Ear: External ear normal. "      Nose: Nose normal.   Eyes:      Conjunctiva/sclera: Conjunctivae normal.      Pupils: Pupils are equal, round, and reactive to light.   Neck:      Thyroid: No thyromegaly.      Trachea: No tracheal deviation.   Cardiovascular:      Rate and Rhythm: Normal rate. Rhythm irregularly irregular.      Heart sounds: Normal heart sounds. No murmur heard.   No friction rub. No gallop.    Pulmonary:      Effort: Pulmonary effort is normal. No respiratory distress.      Breath sounds: Normal breath sounds.   Abdominal:      General: Bowel sounds are normal.      Palpations: Abdomen is soft. There is no mass.      Tenderness: There is no abdominal tenderness. There is no guarding.   Musculoskeletal:         General: Normal range of motion.      Cervical back: Normal range of motion and neck supple.      Right lower leg: No edema.      Left lower leg: No edema.      Comments: Arthritic changes diffusely in hands and fingers.  Ambulating well today.   Lymphadenopathy:      Cervical: No cervical adenopathy.   Skin:     General: Skin is warm and dry.      Capillary Refill: Capillary refill takes less than 2 seconds.      Findings: No rash.   Neurological:      Mental Status: She is alert and oriented to person, place, and time.      Motor: No abnormal muscle tone.      Deep Tendon Reflexes: Reflexes normal.   Psychiatric:         Behavior: Behavior normal.         Thought Content: Thought content normal.         Judgment: Judgment normal.         Recent Results (from the past 2016 hour(s))   POC INR    Collection Time: 04/29/21  1:11 PM    Specimen: Blood   Result Value Ref Range    INR 2.60 (A) 2 - 3   POC INR    Collection Time: 05/26/21 12:55 PM    Specimen: Blood   Result Value Ref Range    INR 2.50 2 - 3   POC INR    Collection Time: 06/24/21 10:27 AM    Specimen: Blood   Result Value Ref Range    INR 2.50 2 - 3     Assessment/Plan   Diagnoses and all orders for this visit:    1. Anticoagulated on warfarin (Primary)    2.  Atrial fibrillation, unspecified type (CMS/HCC)    3. Essential hypertension  -     Comprehensive Metabolic Panel  -     Lipid Panel    4. Hyperlipidemia, unspecified hyperlipidemia type  -     Comprehensive Metabolic Panel  -     Lipid Panel    Other orders  -     POC INR    Appropriate anticoagulation at this time.  Continue her current warfarin unchanged.  Recheck in approximately 4 to 6 weeks.  Unless she has other issues then she can be seen sooner.  A. fib is stable no changes at this time.  Blood pressure is also controlled.  We will check the lipid and complete metabolic panel today.           · COVID-19 Precautions - Patient was compliant in wearing a mask. When I saw the patient, I used appropriate personal protective equipment (PPE) including mask and eye shield (standard procedure).  Additionally, I used gown and gloves if indicated.  Hand hygiene was completed before and after seeing the patient.  · Dictated utilizing Dragon Dictation

## 2021-06-25 LAB
ALBUMIN SERPL-MCNC: 4.4 G/DL (ref 3.5–5.2)
ALBUMIN/GLOB SERPL: 1.8 G/DL
ALP SERPL-CCNC: 59 U/L (ref 39–117)
ALT SERPL-CCNC: 14 U/L (ref 1–33)
AST SERPL-CCNC: 20 U/L (ref 1–32)
BILIRUB SERPL-MCNC: 0.5 MG/DL (ref 0–1.2)
BUN SERPL-MCNC: 11 MG/DL (ref 8–23)
BUN/CREAT SERPL: 15.7 (ref 7–25)
CALCIUM SERPL-MCNC: 9.2 MG/DL (ref 8.6–10.5)
CHLORIDE SERPL-SCNC: 101 MMOL/L (ref 98–107)
CHOLEST SERPL-MCNC: 146 MG/DL (ref 0–200)
CO2 SERPL-SCNC: 29.3 MMOL/L (ref 22–29)
CREAT SERPL-MCNC: 0.7 MG/DL (ref 0.57–1)
GLOBULIN SER CALC-MCNC: 2.5 GM/DL
GLUCOSE SERPL-MCNC: 93 MG/DL (ref 65–99)
HDLC SERPL-MCNC: 46 MG/DL (ref 40–60)
LDLC SERPL CALC-MCNC: 85 MG/DL (ref 0–100)
POTASSIUM SERPL-SCNC: 3.2 MMOL/L (ref 3.5–5.2)
PROT SERPL-MCNC: 6.9 G/DL (ref 6–8.5)
SODIUM SERPL-SCNC: 141 MMOL/L (ref 136–145)
TRIGL SERPL-MCNC: 77 MG/DL (ref 0–150)
VLDLC SERPL CALC-MCNC: 15 MG/DL (ref 5–40)

## 2021-06-28 ENCOUNTER — TELEPHONE (OUTPATIENT)
Dept: FAMILY MEDICINE CLINIC | Facility: CLINIC | Age: 81
End: 2021-06-28

## 2021-06-28 DIAGNOSIS — E87.6 HYPOKALEMIA: Primary | ICD-10-CM

## 2021-06-28 RX ORDER — POTASSIUM CHLORIDE 20 MEQ/1
20 TABLET, EXTENDED RELEASE ORAL DAILY
Qty: 30 TABLET | Refills: 3 | Status: SHIPPED | OUTPATIENT
Start: 2021-06-28 | End: 2021-10-24

## 2021-06-28 NOTE — TELEPHONE ENCOUNTER
[facility]    HUB OK TO READ:    Dr Varela would rather you have the recheck in 2 weeks and not wait for your appointment on the 22nd.  Please let us know if you want to be put on the lab schedule at [facility] or [facility].  This will need to be done 2 weeks after you start the medicine and please start now.  You are also welcome to go to a lab patient center or a [facility] lab if closer to your home.

## 2021-06-28 NOTE — TELEPHONE ENCOUNTER
----- Message from Serg Varela MD sent at 6/28/2021  6:01 PM EDT -----  No I still want her to take her potassium daily and it would be good to recheck her level in the 2 weeks and not in a month because that is starting to really push her out too far.  ----- Message -----  From: Jayesh Brewer  Sent: 6/28/2021   5:57 PM EDT  To: Serg Varela MD    Patient has appointment on 7/22... I advised her that since her number was not too low you may be ok with wait to repeat the level then instead of her coming back in 2 weeks.  But I advised if you really wanted it checked in 2 weeks I will call her back.    ----- Message -----  From: Serg Varela MD  Sent: 6/28/2021   8:18 AM EDT  To: Jayesh Brewer    Your blood work returned as normal findings other than the potassium is low.  This is likely secondary to your water pill.  I recommend starting potassium supplement 1 tablet daily and rechecking your level in 2 weeks.  A prescription has been sent to the pharmacy.  Any results that were noted to be out of range are not significant at this time.  No new problems or issues were found.  No new changes are needed at this time.  Follow-up as discussed in the office.

## 2021-06-30 ENCOUNTER — TELEPHONE (OUTPATIENT)
Dept: FAMILY MEDICINE CLINIC | Facility: CLINIC | Age: 81
End: 2021-06-30

## 2021-06-30 DIAGNOSIS — F41.9 ANXIETY: ICD-10-CM

## 2021-06-30 NOTE — TELEPHONE ENCOUNTER
Caller: Stacey Rojo    Relationship: Self    Best call back number: [phone]     What is the best time to reach you: ANYTIME   Who are you requesting to speak with (clinical staff, provider,  specific staff member): CORNELL OR ASSISTANT     What was the call regarding: THE PATIEN WOULD LIKE CLARIFICATION IF THE PATIENT NEEDS THE APPOINTMENT 07/06/21 AND WOULD LIKE TO KNOW IF THEY ARE SUPPOSED TO SCHEDULE A DIFFERENT APPOINTMENT TIME. PLEASE ADVISE     Do you require a callback: YES

## 2021-07-01 DIAGNOSIS — F41.9 ANXIETY: ICD-10-CM

## 2021-07-01 RX ORDER — ALPRAZOLAM 0.5 MG/1
0.5 TABLET ORAL 3 TIMES DAILY PRN
Qty: 90 TABLET | Refills: 1 | Status: SHIPPED | OUTPATIENT
Start: 2021-07-01 | End: 2021-11-26 | Stop reason: SDUPTHER

## 2021-07-06 RX ORDER — ALPRAZOLAM 0.5 MG/1
0.5 TABLET ORAL 3 TIMES DAILY PRN
Qty: 90 TABLET | Refills: 0 | OUTPATIENT
Start: 2021-07-06

## 2021-07-09 DIAGNOSIS — Z79.01 ANTICOAGULATED ON WARFARIN: ICD-10-CM

## 2021-07-09 DIAGNOSIS — I48.20 CHRONIC ATRIAL FIBRILLATION (HCC): ICD-10-CM

## 2021-07-09 RX ORDER — WARFARIN SODIUM 3 MG/1
TABLET ORAL
Qty: 90 TABLET | Refills: 0 | Status: SHIPPED | OUTPATIENT
Start: 2021-07-09 | End: 2021-10-06

## 2021-07-22 ENCOUNTER — OFFICE VISIT (OUTPATIENT)
Dept: FAMILY MEDICINE CLINIC | Facility: CLINIC | Age: 81
End: 2021-07-22

## 2021-07-22 VITALS
SYSTOLIC BLOOD PRESSURE: 108 MMHG | TEMPERATURE: 98 F | BODY MASS INDEX: 20.39 KG/M2 | WEIGHT: 108 LBS | HEART RATE: 74 BPM | DIASTOLIC BLOOD PRESSURE: 74 MMHG | HEIGHT: 61 IN | OXYGEN SATURATION: 97 %

## 2021-07-22 DIAGNOSIS — Z79.01 ANTICOAGULATED ON WARFARIN: Primary | ICD-10-CM

## 2021-07-22 LAB — INR PPP: 2 (ref 2–3)

## 2021-07-22 PROCEDURE — 99213 OFFICE O/P EST LOW 20 MIN: CPT | Performed by: INTERNAL MEDICINE

## 2021-07-22 PROCEDURE — 36416 COLLJ CAPILLARY BLOOD SPEC: CPT | Performed by: INTERNAL MEDICINE

## 2021-07-22 PROCEDURE — 85610 PROTHROMBIN TIME: CPT | Performed by: INTERNAL MEDICINE

## 2021-07-22 NOTE — PROGRESS NOTES
Subjective   Stacey Rojo is a 81 y.o. female.     Chief Complaint   Patient presents with   • Anticoagulation       History of Present Illness   Answers for HPI/ROS submitted by the patient on 7/22/2021  Please describe your symptoms.: INR and Potassium check  Have you had these symptoms before?: Yes  How long have you been having these symptoms?: Greater than 2 weeks  What is the primary reason for your visit?: Other      Here for follow up INR evaluation.  Currently anticoagulated with warfarin for atrial fibrillation.  The patient is taking warfarin 3 mg T, Th, S, S and 1.5 mg M,W,F.  The current INR goal is 2-3.  The current INR is 2.0.  No significant interval events such as easy bleeding, bruising, fever, weakness or numbness.      History of cholesterol.  Currently, has been feeling well without any myalgias, muscle aches, weakness, numbness, chest pain, short of breath or other issues.  Currently, is adherent with medication regimen of pravastatin 20 mg a day and denies medication side effects. Labs done 6/24/21.     History of hypertension.  Currently, has been feeling well and asymptomatic without any headaches, vision changes, cough, chest pain, shortness of breath, swelling, focal neurologic deficit, memory loss or syncope.  Has been taking the medications regularly and adherent with the regimen of diltiazem CD 180mg daily, lasix 20 mg BID and metoprolol XL 50 mg 3 tabs daily.  Denies medication side effects and no significant interval events. Labs done 6/24/21 and 7/21/21.     History of anxiety.  Doing well currently and sleeping well with no recent panic attacks.  Using the alprazolam as needed but is usually taking only one a day and no noted side effects.  Does not need refill at this time.       The following portions of the patient's history were reviewed and updated as appropriate: allergies, current medications, past family history, past medical history, past social history, past surgical  history and problem list.    Depression Screen:  PHQ-2/PHQ-9 Depression Screening 2/11/2021   Little interest or pleasure in doing things 0   Feeling down, depressed, or hopeless 0   Trouble falling or staying asleep, or sleeping too much 0   Feeling tired or having little energy 0   Poor appetite or overeating 0   Feeling bad about yourself - or that you are a failure or have let yourself or your family down 0   Trouble concentrating on things, such as reading the newspaper or watching television 0   Moving or speaking so slowly that other people could have noticed. Or the opposite - being so fidgety or restless that you have been moving around a lot more than usual 0   Thoughts that you would be better off dead, or of hurting yourself in some way 0   Total Score 0       Past Medical History:   Diagnosis Date   • A-fib (CMS/HCC)    • Abnormality of lung    • Acquired insufficiency of aortic valve    • Antral gastritis    • Anxiety    • Asymptomatic postmenopausal status    • Atrial fibrillation (CMS/HCC)    • Body mass index (BMI) 24.0-24.9, adult    • Chest pain, atypical    • COPD (chronic obstructive pulmonary disease) (CMS/HCC)    • Diverticulosis of colon    • ASHLEE (generalized anxiety disorder)    • GERD (gastroesophageal reflux disease)    • Heart disease    • Hiatal hernia    • High risk medication use    • Hyperactive bowel sounds    • Hyperlipidemia    • Hypertension    • Insomnia    • Intermittent claudication (CMS/HCC)    • Internal hemorrhoids    • Lower extremity edema    • Mitral insufficiency    • KOKO (obstructive sleep apnea)    • Osteopenia    • Pacemaker    • Pain of left patella    • Pedal edema    • Pulmonary hypertension (CMS/HCC)    • SSS (sick sinus syndrome) (CMS/HCC)    • Tricuspid regurgitation    • Urine frequency    • Uterovaginal prolapse    • Wheezing        Past Surgical History:   Procedure Laterality Date   • COLONOSCOPY  01/19/2015    diverticuli and hemorrhoids   • COLONOSCOPY   02/03/2009    diverticuli   • PACEMAKER IMPLANTATION     • PACEMAKER IMPLANTATION      SERIAL # [id number], model #[id number]   • UPPER GASTROINTESTINAL ENDOSCOPY  02/03/2009    small hiatal hernia   • UPPER GASTROINTESTINAL ENDOSCOPY  01/19/2015    sliding small hiatal hernia       Family History   Problem Relation Age of Onset   • Cancer Mother         oral-mouth   • Coronary artery disease Mother    • Cancer Father         oral-mouth   • Cancer Brother         cholangiocarcinoma/ oral-mouth   • Leukemia Brother        Social History     Socioeconomic History   • Marital status:      Spouse name: Not on file   • Number of children: Not on file   • Years of education: Not on file   • Highest education level: Not on file   Tobacco Use   • Smoking status: Former Smoker   • Smokeless tobacco: Never Used   Substance and Sexual Activity   • Alcohol use: No   • Drug use: No   • Sexual activity: Defer       Current Outpatient Medications   Medication Sig Dispense Refill   • ALPRAZolam (XANAX) 0.5 MG tablet Take 1 tablet by mouth 3 (Three) Times a Day As Needed for Anxiety. 90 tablet 1   • azelastine (ASTEPRO) 0.15 % solution nasal spray 2 sprays into the nostril(s) as directed by provider Daily. 1 each 3   • diclofenac (VOLTAREN) 1 % gel gel Apply 4 g topically to the appropriate area as directed 4 (Four) Times a Day As Needed (pain and arthralgia). 100 g 3   • digoxin (LANOXIN) 125 MCG tablet Take 1 tablet by mouth Daily. 90 tablet 1   • dilTIAZem CD (CARDIZEM CD) 180 MG 24 hr capsule Take 1 capsule by mouth Daily. 90 capsule 1   • erythromycin (ROMYCIN) 5 MG/GM ophthalmic ointment      • esomeprazole (nexIUM) 40 MG capsule Take 1 capsule by mouth Daily. 90 capsule 1   • estradiol (ESTRACE) 0.1 MG/GM vaginal cream Insert 1 g into the vagina 2 (Two) Times a Week.     • furosemide (LASIX) 20 MG tablet TAKE TWO TABLETS BY MOUTH EVERY MORNING AND TAKE ONE TABLET BY MOUTH EVERY EVENING 270 tablet 1   • lidocaine  (XYLOCAINE) 5 % ointment APPLY TOPICALLY TO THE APPROPRIATE AREA AS DIRECTED THREE TIMES DAILY 35.44 each 1   • metoprolol succinate XL (TOPROL-XL) 50 MG 24 hr tablet TAKE ONE TABLET BY MOUTH DAILY 90 tablet 1   • olopatadine (PATANOL) 0.1 % ophthalmic solution Administer 1 drop to both eyes 2 (Two) Times a Day. 5 mL 1   • potassium chloride (K-DUR,KLOR-CON) 20 MEQ CR tablet Take 1 tablet by mouth Daily. 30 tablet 3   • pravastatin (PRAVACHOL) 20 MG tablet Take 1 tablet by mouth Daily. 90 tablet 1   • PROBIOTIC PRODUCT PO Take  by mouth.     • tobramycin-dexamethasone (TOBRADEX) 0.3-0.1 % ophthalmic suspension      • warfarin (COUMADIN) 3 MG tablet TAKE ONE TABLET BY MOUTH DAILY 90 tablet 0     No current facility-administered medications for this visit.       Review of Systems   Constitutional: Negative for activity change, appetite change, fatigue, fever, unexpected weight gain and unexpected weight loss.   HENT: Negative for nosebleeds, rhinorrhea, trouble swallowing and voice change.    Eyes: Negative for visual disturbance.   Respiratory: Negative for cough, chest tightness, shortness of breath and wheezing.    Cardiovascular: Negative for chest pain, palpitations and leg swelling.   Gastrointestinal: Negative for abdominal pain, blood in stool, constipation, diarrhea, nausea, vomiting, GERD and indigestion.   Genitourinary: Negative for dysuria, frequency and hematuria.   Musculoskeletal: Negative for arthralgias, back pain and myalgias.   Skin: Negative for rash and bruise.   Neurological: Negative for dizziness, tremors, weakness, light-headedness, numbness, headache and memory problem.   Hematological: Negative for adenopathy. Does not bruise/bleed easily.   Psychiatric/Behavioral: Negative for sleep disturbance and depressed mood. The patient is not nervous/anxious.        Objective   /74 (BP Location: Left arm, Patient Position: Sitting, Cuff Size: Adult)   Pulse 74   Temp 98 °F (36.7 °C)  "(Temporal)   Ht 154.9 cm (60.98\")   Wt 49 kg (108 lb)   SpO2 97%   BMI 20.42 kg/m²     Physical Exam  Vitals and nursing note reviewed.   Constitutional:       General: She is not in acute distress.     Appearance: She is well-developed. She is not diaphoretic.   HENT:      Head: Normocephalic and atraumatic.      Right Ear: External ear normal.      Left Ear: External ear normal.      Nose: Nose normal.   Eyes:      Conjunctiva/sclera: Conjunctivae normal.      Pupils: Pupils are equal, round, and reactive to light.   Neck:      Thyroid: No thyromegaly.      Trachea: No tracheal deviation.   Cardiovascular:      Rate and Rhythm: Normal rate and regular rhythm.      Heart sounds: Normal heart sounds. No murmur heard.   No friction rub. No gallop.    Pulmonary:      Effort: Pulmonary effort is normal. No respiratory distress.      Breath sounds: Normal breath sounds.   Abdominal:      General: Bowel sounds are normal.      Palpations: Abdomen is soft. There is no mass.      Tenderness: There is no abdominal tenderness. There is no guarding.   Musculoskeletal:         General: Normal range of motion.      Cervical back: Normal range of motion and neck supple.      Comments: Arthritic changes in hands with cyst on the right ring finger proximal interphalangeal joint.   Lymphadenopathy:      Cervical: No cervical adenopathy.   Skin:     General: Skin is warm and dry.      Capillary Refill: Capillary refill takes less than 2 seconds.      Findings: No rash.   Neurological:      Mental Status: She is alert and oriented to person, place, and time.      Motor: No abnormal muscle tone.      Deep Tendon Reflexes: Reflexes normal.   Psychiatric:         Behavior: Behavior normal.         Thought Content: Thought content normal.         Judgment: Judgment normal.         Recent Results (from the past 2016 hour(s))   POC INR    Collection Time: 04/29/21  1:11 PM    Specimen: Blood   Result Value Ref Range    INR 2.60 (A) 2 - " 3   POC INR    Collection Time: 05/26/21 12:55 PM    Specimen: Blood   Result Value Ref Range    INR 2.50 2 - 3   POC INR    Collection Time: 06/24/21 12:00 AM    Specimen: Blood   Result Value Ref Range    INR 2.50 (A) 2 - 3   POC INR    Collection Time: 06/24/21 10:27 AM    Specimen: Blood   Result Value Ref Range    INR 2.50 2 - 3   Comprehensive Metabolic Panel    Collection Time: 06/24/21 10:47 AM    Specimen: Blood   Result Value Ref Range    Glucose 93 65 - 99 mg/dL    BUN 11 8 - 23 mg/dL    Creatinine 0.70 0.57 - 1.00 mg/dL    eGFR Non African Am 81 >60 mL/min/1.73    eGFR African Am 98 >60 mL/min/1.73    BUN/Creatinine Ratio 15.7 7.0 - 25.0    Sodium 141 136 - 145 mmol/L    Potassium 3.2 (L) 3.5 - 5.2 mmol/L    Chloride 101 98 - 107 mmol/L    Total CO2 29.3 (H) 22.0 - 29.0 mmol/L    Calcium 9.2 8.6 - 10.5 mg/dL    Total Protein 6.9 6.0 - 8.5 g/dL    Albumin 4.40 3.50 - 5.20 g/dL    Globulin 2.5 gm/dL    A/G Ratio 1.8 g/dL    Total Bilirubin 0.5 0.0 - 1.2 mg/dL    Alkaline Phosphatase 59 39 - 117 U/L    AST (SGOT) 20 1 - 32 U/L    ALT (SGPT) 14 1 - 33 U/L   Lipid Panel    Collection Time: 06/24/21 10:47 AM    Specimen: Blood   Result Value Ref Range    Total Cholesterol 146 0 - 200 mg/dL    Triglycerides 77 0 - 150 mg/dL    HDL Cholesterol 46 40 - 60 mg/dL    VLDL Cholesterol Den 15 5 - 40 mg/dL    LDL Chol Calc (NIH) 85 0 - 100 mg/dL   Basic Metabolic Panel    Collection Time: 07/21/21 11:03 AM    Specimen: Blood   Result Value Ref Range    Glucose 94 65 - 99 mg/dL    BUN 14 8 - 27 mg/dL    Creatinine 0.65 0.57 - 1.00 mg/dL    eGFR Non African Am 85 >59 mL/min/1.73    eGFR African Am 98 >59 mL/min/1.73    BUN/Creatinine Ratio 22 12 - 28    Sodium 139 134 - 144 mmol/L    Potassium 4.2 3.5 - 5.2 mmol/L    Chloride 102 96 - 106 mmol/L    Total CO2 25 20 - 29 mmol/L    Calcium 9.1 8.7 - 10.3 mg/dL   POC INR    Collection Time: 07/22/21  8:11 AM    Specimen: Blood   Result Value Ref Range    INR 2.00 (A) 2 - 3      Assessment/Plan   Diagnoses and all orders for this visit:    1. Anticoagulated on warfarin (Primary)  -     POC INR      INR is currently well controlled.  Continue current warfarin unchanged at this time.  Hypertension is well controlled no symptoms or problems.  No change in medications repeat INR in 4 to 6 weeks.         · COVID-19 Precautions - Patient was compliant in wearing a mask. When I saw the patient, I used appropriate personal protective equipment (PPE) including mask and eye shield (standard procedure).  Additionally, I used gown and gloves if indicated.  Hand hygiene was completed before and after seeing the patient.  · Dictated utilizing Dragon Dictation

## 2021-07-28 DIAGNOSIS — I10 ESSENTIAL HYPERTENSION: ICD-10-CM

## 2021-07-28 DIAGNOSIS — I48.91 ATRIAL FIBRILLATION, UNSPECIFIED TYPE (HCC): ICD-10-CM

## 2021-07-28 RX ORDER — DILTIAZEM HYDROCHLORIDE 180 MG/1
180 CAPSULE, COATED, EXTENDED RELEASE ORAL DAILY
Qty: 90 CAPSULE | Refills: 1 | Status: SHIPPED | OUTPATIENT
Start: 2021-07-28 | End: 2021-10-14 | Stop reason: SDUPTHER

## 2021-08-02 DIAGNOSIS — I10 ESSENTIAL HYPERTENSION: ICD-10-CM

## 2021-08-02 RX ORDER — FUROSEMIDE 20 MG/1
TABLET ORAL
Qty: 270 TABLET | Refills: 1 | Status: SHIPPED | OUTPATIENT
Start: 2021-08-02 | End: 2022-01-31

## 2021-09-01 DIAGNOSIS — K21.9 GASTROESOPHAGEAL REFLUX DISEASE WITHOUT ESOPHAGITIS: ICD-10-CM

## 2021-09-01 RX ORDER — ESOMEPRAZOLE MAGNESIUM 40 MG/1
CAPSULE, DELAYED RELEASE ORAL
Qty: 90 CAPSULE | Refills: 1 | Status: SHIPPED | OUTPATIENT
Start: 2021-09-01 | End: 2022-02-14

## 2021-09-01 NOTE — TELEPHONE ENCOUNTER
Rx Refill Note  Requested Prescriptions     Pending Prescriptions Disp Refills   • esomeprazole (nexIUM) 40 MG capsule [Pharmacy Med Name: ESOMEPRAZOLE MAG DR 40 MG CAP] 90 capsule 1     Sig: TAKE ONE CAPSULE BY MOUTH DAILY      Last office visit with prescribing clinician: 7/22/2021      Next office visit with prescribing clinician: 9/2/2021            Quin Denson MA  09/01/21, 11:03 EDT

## 2021-09-02 ENCOUNTER — OFFICE VISIT (OUTPATIENT)
Dept: FAMILY MEDICINE CLINIC | Facility: CLINIC | Age: 81
End: 2021-09-02

## 2021-09-02 VITALS
WEIGHT: 108 LBS | BODY MASS INDEX: 20.39 KG/M2 | HEART RATE: 64 BPM | DIASTOLIC BLOOD PRESSURE: 64 MMHG | HEIGHT: 61 IN | TEMPERATURE: 97.9 F | SYSTOLIC BLOOD PRESSURE: 116 MMHG | OXYGEN SATURATION: 96 %

## 2021-09-02 DIAGNOSIS — Z79.01 ANTICOAGULATED ON WARFARIN: Primary | ICD-10-CM

## 2021-09-02 DIAGNOSIS — I48.91 ATRIAL FIBRILLATION, UNSPECIFIED TYPE (HCC): ICD-10-CM

## 2021-09-02 LAB — INR PPP: 2.9 (ref 2–3)

## 2021-09-02 PROCEDURE — 36416 COLLJ CAPILLARY BLOOD SPEC: CPT | Performed by: INTERNAL MEDICINE

## 2021-09-02 PROCEDURE — 99213 OFFICE O/P EST LOW 20 MIN: CPT | Performed by: INTERNAL MEDICINE

## 2021-09-02 PROCEDURE — 85610 PROTHROMBIN TIME: CPT | Performed by: INTERNAL MEDICINE

## 2021-09-02 NOTE — PROGRESS NOTES
Subjective   Stacey Rojo is a 81 y.o. female.     Chief Complaint   Patient presents with   • Anticoagulation       History of Present Illness   Here for follow up INR evaluation.  Currently anticoagulated with warfarin for atrial fibrillation.  The patient is taking warfarin 3 mg T, Th, S, S and 1.5 mg M,W,F.  The current INR goal is 2-3.  The current INR is 2.9.  No significant interval events such as easy bleeding, bruising, fever, weakness or numbness.      History of cholesterol.  Currently, has been feeling well without any myalgias, muscle aches, weakness, numbness, chest pain, short of breath or other issues.  Currently, is adherent with medication regimen of pravastatin 20 mg a day and denies medication side effects. Labs done 6/24/21.     History of hypertension.  Currently, has been feeling well and asymptomatic without any headaches, vision changes, cough, chest pain, shortness of breath, swelling, focal neurologic deficit, memory loss or syncope.  Has been taking the medications regularly and adherent with the regimen of diltiazem CD 180mg daily, lasix 20 mg BID and metoprolol XL 50 mg 3 tabs daily.  Denies medication side effects and no significant interval events. Labs done 6/24/21 and 7/21/21.     History of anxiety.  Doing well currently and sleeping well with no recent panic attacks.  Using the alprazolam as needed but is usually taking only one a day and no noted side effects.  Does not need refill at this time.       The following portions of the patient's history were reviewed and updated as appropriate: allergies, current medications, past family history, past medical history, past social history, past surgical history and problem list.    Depression Screen:  PHQ-2/PHQ-9 Depression Screening 2/11/2021   Little interest or pleasure in doing things 0   Feeling down, depressed, or hopeless 0   Trouble falling or staying asleep, or sleeping too much 0   Feeling tired or having little energy 0    Poor appetite or overeating 0   Feeling bad about yourself - or that you are a failure or have let yourself or your family down 0   Trouble concentrating on things, such as reading the newspaper or watching television 0   Moving or speaking so slowly that other people could have noticed. Or the opposite - being so fidgety or restless that you have been moving around a lot more than usual 0   Thoughts that you would be better off dead, or of hurting yourself in some way 0   Total Score 0       Past Medical History:   Diagnosis Date   • A-fib (CMS/HCC)    • Abnormality of lung    • Acquired insufficiency of aortic valve    • Antral gastritis    • Anxiety    • Asymptomatic postmenopausal status    • Atrial fibrillation (CMS/HCC)    • Body mass index (BMI) 24.0-24.9, adult    • Chest pain, atypical    • COPD (chronic obstructive pulmonary disease) (CMS/HCC)    • Diverticulosis of colon    • ASHLEE (generalized anxiety disorder)    • GERD (gastroesophageal reflux disease)    • Heart disease    • Hiatal hernia    • High risk medication use    • Hyperactive bowel sounds    • Hyperlipidemia    • Hypertension    • Insomnia    • Intermittent claudication (CMS/HCC)    • Internal hemorrhoids    • Lower extremity edema    • Mitral insufficiency    • KOKO (obstructive sleep apnea)    • Osteopenia    • Pacemaker    • Pain of left patella    • Pedal edema    • Pulmonary hypertension (CMS/HCC)    • SSS (sick sinus syndrome) (CMS/HCC)    • Tricuspid regurgitation    • Urine frequency    • Uterovaginal prolapse    • Wheezing        Past Surgical History:   Procedure Laterality Date   • COLONOSCOPY  01/19/2015    diverticuli and hemorrhoids   • COLONOSCOPY  02/03/2009    diverticuli   • PACEMAKER IMPLANTATION     • PACEMAKER IMPLANTATION      SERIAL # [id number], model #[id number]   • UPPER GASTROINTESTINAL ENDOSCOPY  02/03/2009    small hiatal hernia   • UPPER GASTROINTESTINAL ENDOSCOPY  01/19/2015    sliding small hiatal hernia        Family History   Problem Relation Age of Onset   • Cancer Mother         oral-mouth   • Coronary artery disease Mother    • Cancer Father         oral-mouth   • Cancer Brother         cholangiocarcinoma/ oral-mouth   • Leukemia Brother        Social History     Socioeconomic History   • Marital status:      Spouse name: Not on file   • Number of children: Not on file   • Years of education: Not on file   • Highest education level: Not on file   Tobacco Use   • Smoking status: Former Smoker   • Smokeless tobacco: Never Used   Substance and Sexual Activity   • Alcohol use: No   • Drug use: No   • Sexual activity: Defer       Current Outpatient Medications   Medication Sig Dispense Refill   • ALPRAZolam (XANAX) 0.5 MG tablet Take 1 tablet by mouth 3 (Three) Times a Day As Needed for Anxiety. 90 tablet 1   • azelastine (ASTEPRO) 0.15 % solution nasal spray 2 sprays into the nostril(s) as directed by provider Daily. 1 each 3   • diclofenac (VOLTAREN) 1 % gel gel Apply 4 g topically to the appropriate area as directed 4 (Four) Times a Day As Needed (pain and arthralgia). 100 g 3   • digoxin (LANOXIN) 125 MCG tablet Take 1 tablet by mouth Daily. 90 tablet 1   • dilTIAZem CD (CARDIZEM CD) 180 MG 24 hr capsule Take 1 capsule by mouth Daily. 90 capsule 1   • erythromycin (ROMYCIN) 5 MG/GM ophthalmic ointment      • esomeprazole (nexIUM) 40 MG capsule TAKE ONE CAPSULE BY MOUTH DAILY 90 capsule 1   • estradiol (ESTRACE) 0.1 MG/GM vaginal cream Insert 1 g into the vagina 2 (Two) Times a Week.     • furosemide (LASIX) 20 MG tablet TAKE TWO TABLETS BY MOUTH EVERY MORNING AND TAKE ONE TABLET BY MOUTH EVERY EVENING 270 tablet 1   • lidocaine (XYLOCAINE) 5 % ointment APPLY TOPICALLY TO THE APPROPRIATE AREA AS DIRECTED THREE TIMES DAILY 35.44 each 1   • metoprolol succinate XL (TOPROL-XL) 50 MG 24 hr tablet TAKE ONE TABLET BY MOUTH DAILY 90 tablet 1   • olopatadine (PATANOL) 0.1 % ophthalmic solution Administer 1 drop to  "both eyes 2 (Two) Times a Day. 5 mL 1   • potassium chloride (K-DUR,KLOR-CON) 20 MEQ CR tablet Take 1 tablet by mouth Daily. 30 tablet 3   • pravastatin (PRAVACHOL) 20 MG tablet Take 1 tablet by mouth Daily. 90 tablet 1   • PROBIOTIC PRODUCT PO Take  by mouth.     • tobramycin-dexamethasone (TOBRADEX) 0.3-0.1 % ophthalmic suspension      • warfarin (COUMADIN) 3 MG tablet TAKE ONE TABLET BY MOUTH DAILY 90 tablet 0     No current facility-administered medications for this visit.       Review of Systems    Objective   /64 (BP Location: Left arm, Patient Position: Sitting, Cuff Size: Adult)   Pulse 64   Temp 97.9 °F (36.6 °C) (Temporal)   Ht 154.9 cm (60.98\")   Wt 49 kg (108 lb)   SpO2 96%   BMI 20.42 kg/m²     Physical Exam  Vitals and nursing note reviewed.   Constitutional:       General: She is not in acute distress.     Appearance: She is well-developed. She is not diaphoretic.   HENT:      Head: Normocephalic and atraumatic.      Right Ear: External ear normal.      Left Ear: External ear normal.      Nose: Nose normal.   Eyes:      Conjunctiva/sclera: Conjunctivae normal.      Pupils: Pupils are equal, round, and reactive to light.   Neck:      Thyroid: No thyromegaly.      Trachea: No tracheal deviation.   Cardiovascular:      Rate and Rhythm: Normal rate and regular rhythm.      Heart sounds: Normal heart sounds. No murmur heard.   No friction rub. No gallop.    Pulmonary:      Effort: Pulmonary effort is normal. No respiratory distress.      Breath sounds: Normal breath sounds.   Abdominal:      General: Bowel sounds are normal.      Palpations: Abdomen is soft. There is no mass.      Tenderness: There is no abdominal tenderness. There is no guarding.   Musculoskeletal:         General: Normal range of motion.      Cervical back: Normal range of motion and neck supple.      Comments: Arthritic changes in the hands and fingers.   Lymphadenopathy:      Cervical: No cervical adenopathy.   Skin:     " General: Skin is warm and dry.      Capillary Refill: Capillary refill takes less than 2 seconds.      Findings: No rash.   Neurological:      Mental Status: She is alert and oriented to person, place, and time.      Motor: No abnormal muscle tone.      Deep Tendon Reflexes: Reflexes normal.   Psychiatric:         Behavior: Behavior normal.         Thought Content: Thought content normal.         Judgment: Judgment normal.         Recent Results (from the past 2016 hour(s))   POC INR    Collection Time: 06/24/21 12:00 AM    Specimen: Blood   Result Value Ref Range    INR 2.50 (A) 2 - 3   POC INR    Collection Time: 06/24/21 10:27 AM    Specimen: Blood   Result Value Ref Range    INR 2.50 2 - 3   Comprehensive Metabolic Panel    Collection Time: 06/24/21 10:47 AM    Specimen: Blood   Result Value Ref Range    Glucose 93 65 - 99 mg/dL    BUN 11 8 - 23 mg/dL    Creatinine 0.70 0.57 - 1.00 mg/dL    eGFR Non African Am 81 >60 mL/min/1.73    eGFR African Am 98 >60 mL/min/1.73    BUN/Creatinine Ratio 15.7 7.0 - 25.0    Sodium 141 136 - 145 mmol/L    Potassium 3.2 (L) 3.5 - 5.2 mmol/L    Chloride 101 98 - 107 mmol/L    Total CO2 29.3 (H) 22.0 - 29.0 mmol/L    Calcium 9.2 8.6 - 10.5 mg/dL    Total Protein 6.9 6.0 - 8.5 g/dL    Albumin 4.40 3.50 - 5.20 g/dL    Globulin 2.5 gm/dL    A/G Ratio 1.8 g/dL    Total Bilirubin 0.5 0.0 - 1.2 mg/dL    Alkaline Phosphatase 59 39 - 117 U/L    AST (SGOT) 20 1 - 32 U/L    ALT (SGPT) 14 1 - 33 U/L   Lipid Panel    Collection Time: 06/24/21 10:47 AM    Specimen: Blood   Result Value Ref Range    Total Cholesterol 146 0 - 200 mg/dL    Triglycerides 77 0 - 150 mg/dL    HDL Cholesterol 46 40 - 60 mg/dL    VLDL Cholesterol Den 15 5 - 40 mg/dL    LDL Chol Calc (NIH) 85 0 - 100 mg/dL   Basic Metabolic Panel    Collection Time: 07/21/21 11:03 AM    Specimen: Blood   Result Value Ref Range    Glucose 94 65 - 99 mg/dL    BUN 14 8 - 27 mg/dL    Creatinine 0.65 0.57 - 1.00 mg/dL    eGFR Non African Am  85 >59 mL/min/1.73    eGFR African Am 98 >59 mL/min/1.73    BUN/Creatinine Ratio 22 12 - 28    Sodium 139 134 - 144 mmol/L    Potassium 4.2 3.5 - 5.2 mmol/L    Chloride 102 96 - 106 mmol/L    Total CO2 25 20 - 29 mmol/L    Calcium 9.1 8.7 - 10.3 mg/dL   POC INR    Collection Time: 07/22/21  8:11 AM    Specimen: Blood   Result Value Ref Range    INR 2.00 (A) 2 - 3   POC INR    Collection Time: 09/02/21 10:48 AM    Specimen: Blood   Result Value Ref Range    INR 2.90 (A) 2 - 3     Assessment/Plan   Diagnoses and all orders for this visit:    1. Anticoagulated on warfarin (Primary)    2. Atrial fibrillation, unspecified type (CMS/HCC)    Other orders  -     POC INR      INR is currently well controlled.  Continue current warfarin unchanged at this time.  Hypertension is well controlled no symptoms or problems.  No change in medications repeat INR in 4 to 6 weeks.         · COVID-19 Precautions - Patient was compliant in wearing a mask. When I saw the patient, I used appropriate personal protective equipment (PPE) including mask and eye shield (standard procedure).  Additionally, I used gown and gloves if indicated.  Hand hygiene was completed before and after seeing the patient.  · Dictated utilizing Dragon Dictation

## 2021-09-17 DIAGNOSIS — E78.5 HYPERLIPIDEMIA, UNSPECIFIED HYPERLIPIDEMIA TYPE: ICD-10-CM

## 2021-09-17 RX ORDER — PRAVASTATIN SODIUM 20 MG
TABLET ORAL
Qty: 30 TABLET | Refills: 0 | Status: SHIPPED | OUTPATIENT
Start: 2021-09-17 | End: 2021-10-18

## 2021-10-06 DIAGNOSIS — I48.20 CHRONIC ATRIAL FIBRILLATION (HCC): ICD-10-CM

## 2021-10-06 DIAGNOSIS — Z79.01 ANTICOAGULATED ON WARFARIN: ICD-10-CM

## 2021-10-06 RX ORDER — WARFARIN SODIUM 3 MG/1
TABLET ORAL
Qty: 90 TABLET | Refills: 0 | Status: SHIPPED | OUTPATIENT
Start: 2021-10-06 | End: 2022-01-03

## 2021-10-06 NOTE — TELEPHONE ENCOUNTER
Rx Refill Note  Requested Prescriptions     Pending Prescriptions Disp Refills   • warfarin (COUMADIN) 3 MG tablet [Pharmacy Med Name: WARFARIN SODIUM 3 MG TABLET] 90 tablet 0     Sig: TAKE ONE TABLET BY MOUTH DAILY      Last office visit with prescribing clinician: 9/2/2021      Next office visit with prescribing clinician: 10/14/2021            Quin Denson MA  10/06/21, 15:54 EDT

## 2021-10-14 ENCOUNTER — OFFICE VISIT (OUTPATIENT)
Dept: FAMILY MEDICINE CLINIC | Facility: CLINIC | Age: 81
End: 2021-10-14

## 2021-10-14 VITALS
TEMPERATURE: 98.1 F | OXYGEN SATURATION: 98 % | DIASTOLIC BLOOD PRESSURE: 68 MMHG | SYSTOLIC BLOOD PRESSURE: 112 MMHG | HEART RATE: 67 BPM | BODY MASS INDEX: 20.39 KG/M2 | WEIGHT: 108 LBS | HEIGHT: 61 IN

## 2021-10-14 DIAGNOSIS — Z23 IMMUNIZATION DUE: ICD-10-CM

## 2021-10-14 DIAGNOSIS — I48.91 ATRIAL FIBRILLATION, UNSPECIFIED TYPE (HCC): ICD-10-CM

## 2021-10-14 DIAGNOSIS — I10 ESSENTIAL HYPERTENSION: ICD-10-CM

## 2021-10-14 DIAGNOSIS — Z79.01 ANTICOAGULATED ON WARFARIN: Primary | ICD-10-CM

## 2021-10-14 LAB — INR PPP: 2.5 (ref 2–3)

## 2021-10-14 PROCEDURE — 85610 PROTHROMBIN TIME: CPT | Performed by: INTERNAL MEDICINE

## 2021-10-14 PROCEDURE — 90662 IIV NO PRSV INCREASED AG IM: CPT | Performed by: INTERNAL MEDICINE

## 2021-10-14 PROCEDURE — 36416 COLLJ CAPILLARY BLOOD SPEC: CPT | Performed by: INTERNAL MEDICINE

## 2021-10-14 PROCEDURE — G0008 ADMIN INFLUENZA VIRUS VAC: HCPCS | Performed by: INTERNAL MEDICINE

## 2021-10-14 PROCEDURE — 99213 OFFICE O/P EST LOW 20 MIN: CPT | Performed by: INTERNAL MEDICINE

## 2021-10-14 RX ORDER — DIGOXIN 125 MCG
125 TABLET ORAL DAILY
Qty: 90 TABLET | Refills: 1 | Status: SHIPPED | OUTPATIENT
Start: 2021-10-14 | End: 2022-04-11

## 2021-10-14 RX ORDER — DILTIAZEM HYDROCHLORIDE 180 MG/1
180 CAPSULE, COATED, EXTENDED RELEASE ORAL DAILY
Qty: 90 CAPSULE | Refills: 1 | Status: SHIPPED | OUTPATIENT
Start: 2021-10-14 | End: 2022-07-08

## 2021-10-14 NOTE — PROGRESS NOTES
Subjective   Stacey Rojo is a 81 y.o. female.     Chief Complaint   Patient presents with   • Anticoagulation       History of Present Illness   Here for follow up INR evaluation.  Currently anticoagulated with warfarin for atrial fibrillation.  The patient is taking warfarin 3 mg T, Th, S, S and 1.5 mg M,W,F.  The current INR goal is 2-3.  The current INR is 2.5.  No significant interval events such as easy bleeding, bruising, fever, weakness or numbness.          History of cholesterol.  Currently, has been feeling well without any myalgias, muscle aches, weakness, numbness, chest pain, short of breath or other issues.  Currently, is adherent with medication regimen of pravastatin 20 mg a day and denies medication side effects. Labs done 6/24/21.     History of hypertension.  Currently, has been feeling well and asymptomatic without any headaches, vision changes, cough, chest pain, shortness of breath, swelling, focal neurologic deficit, memory loss or syncope.  Has been taking the medications regularly and adherent with the regimen of diltiazem CD 180mg daily, lasix 20 mg BID and metoprolol XL 50 mg 3 tabs daily.  Denies medication side effects and no significant interval events. Labs done 6/24/21 and 7/21/21.     History of anxiety.  Doing well currently and sleeping well with no recent panic attacks.  Using the alprazolam as needed but is usually taking only one a day and no noted side effects.  Does not need refill at this time.       The following portions of the patient's history were reviewed and updated as appropriate: allergies, current medications, past family history, past medical history, past social history, past surgical history and problem list.    Depression Screen:  PHQ-2/PHQ-9 Depression Screening 2/11/2021   Little interest or pleasure in doing things 0   Feeling down, depressed, or hopeless 0   Trouble falling or staying asleep, or sleeping too much 0   Feeling tired or having little energy  0   Poor appetite or overeating 0   Feeling bad about yourself - or that you are a failure or have let yourself or your family down 0   Trouble concentrating on things, such as reading the newspaper or watching television 0   Moving or speaking so slowly that other people could have noticed. Or the opposite - being so fidgety or restless that you have been moving around a lot more than usual 0   Thoughts that you would be better off dead, or of hurting yourself in some way 0   Total Score 0       Past Medical History:   Diagnosis Date   • A-fib ([facility])    • Abnormality of lung    • Acquired insufficiency of aortic valve    • Antral gastritis    • Anxiety    • Asymptomatic postmenopausal status    • Atrial fibrillation ([facility])    • Body mass index (BMI) 24.0-24.9, adult    • Chest pain, atypical    • COPD (chronic obstructive pulmonary disease) ([facility])    • Diverticulosis of colon    • ASHLEE (generalized anxiety disorder)    • GERD (gastroesophageal reflux disease)    • Heart disease    • Hiatal hernia    • High risk medication use    • Hyperactive bowel sounds    • Hyperlipidemia    • Hypertension    • Insomnia    • Intermittent claudication ([facility])    • Internal hemorrhoids    • Lower extremity edema    • Mitral insufficiency    • KOKO (obstructive sleep apnea)    • Osteopenia    • Pacemaker    • Pain of left patella    • Pedal edema    • Pulmonary hypertension ([facility])    • SSS (sick sinus syndrome) ([facility])    • Tricuspid regurgitation    • Urine frequency    • Uterovaginal prolapse    • Wheezing        Past Surgical History:   Procedure Laterality Date   • COLONOSCOPY  01/19/2015    diverticuli and hemorrhoids   • COLONOSCOPY  02/03/2009    diverticuli   • PACEMAKER IMPLANTATION     • PACEMAKER IMPLANTATION      SERIAL # [id number], model #[id number]   • UPPER GASTROINTESTINAL ENDOSCOPY  02/03/2009    small hiatal hernia   • UPPER GASTROINTESTINAL ENDOSCOPY  01/19/2015    sliding small hiatal hernia       Family History   Problem  Relation Age of Onset   • Cancer Mother         oral-mouth   • Coronary artery disease Mother    • Cancer Father         oral-mouth   • Cancer Brother         cholangiocarcinoma/ oral-mouth   • Leukemia Brother        Social History     Socioeconomic History   • Marital status:    Tobacco Use   • Smoking status: Former Smoker   • Smokeless tobacco: Never Used   Substance and Sexual Activity   • Alcohol use: No   • Drug use: No   • Sexual activity: Defer       Current Outpatient Medications   Medication Sig Dispense Refill   • ALPRAZolam (XANAX) 0.5 MG tablet Take 1 tablet by mouth 3 (Three) Times a Day As Needed for Anxiety. 90 tablet 1   • azelastine (ASTEPRO) 0.15 % solution nasal spray 2 sprays into the nostril(s) as directed by provider Daily. 1 each 3   • diclofenac (VOLTAREN) 1 % gel gel Apply 4 g topically to the appropriate area as directed 4 (Four) Times a Day As Needed (pain and arthralgia). 100 g 3   • digoxin (LANOXIN) 125 MCG tablet Take 1 tablet by mouth Daily. 90 tablet 1   • dilTIAZem CD (CARDIZEM CD) 180 MG 24 hr capsule Take 1 capsule by mouth Daily. 90 capsule 1   • erythromycin (ROMYCIN) 5 MG/GM ophthalmic ointment      • esomeprazole (nexIUM) 40 MG capsule TAKE ONE CAPSULE BY MOUTH DAILY 90 capsule 1   • estradiol (ESTRACE) 0.1 MG/GM vaginal cream Insert 1 g into the vagina 2 (Two) Times a Week.     • furosemide (LASIX) 20 MG tablet TAKE TWO TABLETS BY MOUTH EVERY MORNING AND TAKE ONE TABLET BY MOUTH EVERY EVENING 270 tablet 1   • lidocaine (XYLOCAINE) 5 % ointment APPLY TOPICALLY TO THE APPROPRIATE AREA AS DIRECTED THREE TIMES DAILY 35.44 each 1   • metoprolol succinate XL (TOPROL-XL) 50 MG 24 hr tablet TAKE ONE TABLET BY MOUTH DAILY 90 tablet 1   • olopatadine (PATANOL) 0.1 % ophthalmic solution Administer 1 drop to both eyes 2 (Two) Times a Day. 5 mL 1   • potassium chloride (K-DUR,KLOR-CON) 20 MEQ CR tablet Take 1 tablet by mouth Daily. 30 tablet 3   • pravastatin (PRAVACHOL) 20 MG  "tablet TAKE ONE TABLET BY MOUTH DAILY 30 tablet 0   • PROBIOTIC PRODUCT PO Take  by mouth.     • tobramycin-dexamethasone (TOBRADEX) 0.3-0.1 % ophthalmic suspension      • warfarin (COUMADIN) 3 MG tablet TAKE ONE TABLET BY MOUTH DAILY 90 tablet 0     No current facility-administered medications for this visit.       Review of Systems   Constitutional: Negative for activity change, appetite change, fatigue, fever, unexpected weight gain and unexpected weight loss.   HENT: Negative for nosebleeds, rhinorrhea, trouble swallowing and voice change.    Eyes: Negative for visual disturbance.   Respiratory: Negative for cough, chest tightness, shortness of breath and wheezing.    Cardiovascular: Negative for chest pain, palpitations and leg swelling.   Gastrointestinal: Negative for abdominal pain, blood in stool, constipation, diarrhea, nausea, vomiting, GERD and indigestion.   Genitourinary: Negative for dysuria, frequency and hematuria.   Musculoskeletal: Negative for arthralgias, back pain and myalgias.   Skin: Negative for rash and bruise.   Neurological: Negative for dizziness, tremors, weakness, light-headedness, numbness, headache and memory problem.   Hematological: Negative for adenopathy. Does not bruise/bleed easily.   Psychiatric/Behavioral: Negative for sleep disturbance and depressed mood. The patient is not nervous/anxious.        Objective   /68 (BP Location: Left arm, Patient Position: Sitting, Cuff Size: Adult)   Pulse 67   Temp 98.1 °F (36.7 °C) (Temporal)   Ht 154.9 cm (60.98\")   Wt 49 kg (108 lb)   SpO2 98%   BMI 20.42 kg/m²     Physical Exam  Vitals and nursing note reviewed.   Constitutional:       General: She is not in acute distress.     Appearance: She is well-developed. She is not diaphoretic.   HENT:      Head: Normocephalic and atraumatic.      Right Ear: External ear normal.      Left Ear: External ear normal.      Nose: Nose normal.   Eyes:      Conjunctiva/sclera: Conjunctivae " normal.      Pupils: Pupils are equal, round, and reactive to light.   Neck:      Thyroid: No thyromegaly.      Trachea: No tracheal deviation.   Cardiovascular:      Rate and Rhythm: Normal rate and regular rhythm.      Heart sounds: Normal heart sounds. No murmur heard.  No friction rub. No gallop.    Pulmonary:      Effort: Pulmonary effort is normal. No respiratory distress.      Breath sounds: Normal breath sounds.   Abdominal:      General: Bowel sounds are normal.      Palpations: Abdomen is soft. There is no mass.      Tenderness: There is no abdominal tenderness. There is no guarding.   Musculoskeletal:         General: Normal range of motion.      Cervical back: Normal range of motion and neck supple.   Lymphadenopathy:      Cervical: No cervical adenopathy.   Skin:     General: Skin is warm and dry.      Capillary Refill: Capillary refill takes less than 2 seconds.      Findings: No rash.   Neurological:      Mental Status: She is alert and oriented to person, place, and time.      Motor: No abnormal muscle tone.      Deep Tendon Reflexes: Reflexes normal.   Psychiatric:         Behavior: Behavior normal.         Thought Content: Thought content normal.         Judgment: Judgment normal.         Recent Results (from the past 2016 hour(s))   POC INR    Collection Time: 09/02/21 10:48 AM    Specimen: Blood   Result Value Ref Range    INR 2.90 (A) 2 - 3   POC INR    Collection Time: 10/14/21  3:25 PM    Specimen: Blood   Result Value Ref Range    INR 2.50 (A) 2 - 3     Assessment/Plan   Diagnoses and all orders for this visit:    1. Anticoagulated on warfarin (Primary)  -     POC INR    2. Atrial fibrillation, unspecified type (HCC)  -     dilTIAZem CD (CARDIZEM CD) 180 MG 24 hr capsule; Take 1 capsule by mouth Daily.  Dispense: 90 capsule; Refill: 1    3. Essential hypertension  -     dilTIAZem CD (CARDIZEM CD) 180 MG 24 hr capsule; Take 1 capsule by mouth Daily.  Dispense: 90 capsule; Refill: 1    4.  Immunization due  -     Fluzone High-Dose 65+yrs (3005-2577)    Other orders  -     digoxin (LANOXIN) 125 MCG tablet; Take 1 tablet by mouth Daily.  Dispense: 90 tablet; Refill: 1    Appropriately anticoagulated within range.  Continue current medication unchanged.  Recheck INR in 4 to 6 weeks.  Hypertension is well controlled continue current medication unchanged.  Influenza vaccine given today in office per patient request.           · COVID-19 Precautions - Patient was compliant in wearing a mask. When I saw the patient, I used appropriate personal protective equipment (PPE) including mask and eye shield (standard procedure).  Additionally, I used gown and gloves if indicated.  Hand hygiene was completed before and after seeing the patient.  · Dictated utilizing Dragon Dictation

## 2021-10-17 DIAGNOSIS — E78.5 HYPERLIPIDEMIA, UNSPECIFIED HYPERLIPIDEMIA TYPE: ICD-10-CM

## 2021-10-18 RX ORDER — PRAVASTATIN SODIUM 20 MG
TABLET ORAL
Qty: 30 TABLET | Refills: 0 | Status: SHIPPED | OUTPATIENT
Start: 2021-10-18 | End: 2021-11-16

## 2021-10-18 NOTE — TELEPHONE ENCOUNTER
Rx Refill Note  Requested Prescriptions     Pending Prescriptions Disp Refills   • pravastatin (PRAVACHOL) 20 MG tablet [Pharmacy Med Name: PRAVASTATIN SODIUM 20 MG TAB] 30 tablet 0     Sig: TAKE ONE TABLET BY MOUTH DAILY      Last office visit with prescribing clinician: 10/14/2021      Next office visit with prescribing clinician: Visit date not found            Quin Denson MA  10/18/21, 08:25 EDT

## 2021-10-23 DIAGNOSIS — E87.6 HYPOKALEMIA: ICD-10-CM

## 2021-10-24 RX ORDER — POTASSIUM CHLORIDE 1500 MG/1
TABLET, EXTENDED RELEASE ORAL
Qty: 30 TABLET | Refills: 3 | Status: SHIPPED | OUTPATIENT
Start: 2021-10-24 | End: 2022-02-23

## 2021-10-24 NOTE — TELEPHONE ENCOUNTER
Rx Refill Note  Requested Prescriptions     Pending Prescriptions Disp Refills   • KLOR-CON 20 MEQ CR tablet [Pharmacy Med Name: KLOR-CON M20 TABLET] 30 tablet 3     Sig: TAKE ONE TABLET BY MOUTH DAILY      Last office visit with prescribing clinician: 10/14/2021      Next office visit with prescribing clinician: due 11/28/2021            Last filled 6/28/2021           Marie Atkinson MA  10/24/21, 14:42 EDT

## 2021-11-16 DIAGNOSIS — E78.5 HYPERLIPIDEMIA, UNSPECIFIED HYPERLIPIDEMIA TYPE: ICD-10-CM

## 2021-11-16 RX ORDER — PRAVASTATIN SODIUM 20 MG
TABLET ORAL
Qty: 30 TABLET | Refills: 0 | Status: SHIPPED | OUTPATIENT
Start: 2021-11-16 | End: 2021-12-22

## 2021-11-16 NOTE — TELEPHONE ENCOUNTER
Rx Refill Note  Requested Prescriptions     Pending Prescriptions Disp Refills   • pravastatin (PRAVACHOL) 20 MG tablet [Pharmacy Med Name: PRAVASTATIN SODIUM 20 MG TAB] 30 tablet 0     Sig: TAKE ONE TABLET BY MOUTH DAILY      Last office visit with prescribing clinician: 10/14/2021      Next office visit with prescribing clinician: Visit date not found            Quin Denson MA  11/16/21, 10:52 EST

## 2021-11-18 ENCOUNTER — CLINICAL SUPPORT (OUTPATIENT)
Dept: FAMILY MEDICINE CLINIC | Facility: CLINIC | Age: 81
End: 2021-11-18

## 2021-11-18 DIAGNOSIS — Z79.01 ANTICOAGULATED ON WARFARIN: Primary | ICD-10-CM

## 2021-11-18 LAB — INR PPP: 2.1 (ref 2–3)

## 2021-11-18 PROCEDURE — 85610 PROTHROMBIN TIME: CPT | Performed by: INTERNAL MEDICINE

## 2021-11-18 PROCEDURE — 36416 COLLJ CAPILLARY BLOOD SPEC: CPT | Performed by: INTERNAL MEDICINE

## 2021-11-26 DIAGNOSIS — F41.9 ANXIETY: ICD-10-CM

## 2021-11-29 RX ORDER — METOPROLOL SUCCINATE 50 MG/1
50 TABLET, EXTENDED RELEASE ORAL DAILY
Qty: 90 TABLET | Refills: 1 | Status: SHIPPED | OUTPATIENT
Start: 2021-11-29 | End: 2022-02-24 | Stop reason: SDUPTHER

## 2021-11-29 RX ORDER — ALPRAZOLAM 0.5 MG/1
0.5 TABLET ORAL 3 TIMES DAILY PRN
Qty: 90 TABLET | Refills: 1 | Status: SHIPPED | OUTPATIENT
Start: 2021-11-29 | End: 2022-03-28 | Stop reason: SDUPTHER

## 2021-12-02 ENCOUNTER — CLINICAL SUPPORT (OUTPATIENT)
Dept: FAMILY MEDICINE CLINIC | Facility: CLINIC | Age: 81
End: 2021-12-02

## 2021-12-02 DIAGNOSIS — Z79.01 ANTICOAGULATED ON WARFARIN: Primary | ICD-10-CM

## 2021-12-02 LAB — INR PPP: 2.9 (ref 2–3)

## 2021-12-02 PROCEDURE — 36416 COLLJ CAPILLARY BLOOD SPEC: CPT | Performed by: INTERNAL MEDICINE

## 2021-12-02 PROCEDURE — 85610 PROTHROMBIN TIME: CPT | Performed by: INTERNAL MEDICINE

## 2021-12-22 DIAGNOSIS — E78.5 HYPERLIPIDEMIA, UNSPECIFIED HYPERLIPIDEMIA TYPE: ICD-10-CM

## 2021-12-22 RX ORDER — PRAVASTATIN SODIUM 20 MG
TABLET ORAL
Qty: 30 TABLET | Refills: 0 | Status: SHIPPED | OUTPATIENT
Start: 2021-12-22 | End: 2022-01-25

## 2021-12-22 NOTE — TELEPHONE ENCOUNTER
Rx Refill Note  Requested Prescriptions     Pending Prescriptions Disp Refills   • pravastatin (PRAVACHOL) 20 MG tablet [Pharmacy Med Name: PRAVASTATIN SODIUM 20 MG TAB] 30 tablet 0     Sig: TAKE ONE TABLET BY MOUTH DAILY      Last office visit with prescribing clinician: 10/14/2021      Next office visit with prescribing clinician: 1/13/2022      Stefania Munoz MA  12/22/21, 12:37 EST

## 2022-01-01 ENCOUNTER — TELEPHONE (OUTPATIENT)
Dept: FAMILY MEDICINE CLINIC | Facility: CLINIC | Age: 82
End: 2022-01-01

## 2022-01-02 DIAGNOSIS — Z79.01 ANTICOAGULATED ON WARFARIN: ICD-10-CM

## 2022-01-02 DIAGNOSIS — I48.20 CHRONIC ATRIAL FIBRILLATION: ICD-10-CM

## 2022-01-03 RX ORDER — WARFARIN SODIUM 3 MG/1
TABLET ORAL
Qty: 90 TABLET | Refills: 0 | Status: SHIPPED | OUTPATIENT
Start: 2022-01-03 | End: 2022-03-24 | Stop reason: SDUPTHER

## 2022-01-03 NOTE — TELEPHONE ENCOUNTER
Rx Refill Note  Requested Prescriptions     Pending Prescriptions Disp Refills   • warfarin (COUMADIN) 3 MG tablet [Pharmacy Med Name: WARFARIN SODIUM 3 MG TABLET] 90 tablet 0     Sig: TAKE ONE TABLET BY MOUTH DAILY      Last office visit with prescribing clinician: 10/14/2021      Next office visit with prescribing clinician: 1/13/2022            Quin Denson MA  01/03/22, 08:53 EST

## 2022-01-13 ENCOUNTER — OFFICE VISIT (OUTPATIENT)
Dept: FAMILY MEDICINE CLINIC | Facility: CLINIC | Age: 82
End: 2022-01-13

## 2022-01-13 VITALS
HEART RATE: 69 BPM | OXYGEN SATURATION: 98 % | SYSTOLIC BLOOD PRESSURE: 132 MMHG | WEIGHT: 115 LBS | TEMPERATURE: 97.7 F | DIASTOLIC BLOOD PRESSURE: 80 MMHG | HEIGHT: 61 IN | BODY MASS INDEX: 21.71 KG/M2

## 2022-01-13 DIAGNOSIS — Z79.01 ANTICOAGULATED ON WARFARIN: Primary | ICD-10-CM

## 2022-01-13 DIAGNOSIS — I10 PRIMARY HYPERTENSION: ICD-10-CM

## 2022-01-13 DIAGNOSIS — I48.91 ATRIAL FIBRILLATION, UNSPECIFIED TYPE: ICD-10-CM

## 2022-01-13 LAB — INR PPP: 2.9 (ref 2–3)

## 2022-01-13 PROCEDURE — 85610 PROTHROMBIN TIME: CPT | Performed by: INTERNAL MEDICINE

## 2022-01-13 PROCEDURE — 99213 OFFICE O/P EST LOW 20 MIN: CPT | Performed by: INTERNAL MEDICINE

## 2022-01-13 PROCEDURE — 36416 COLLJ CAPILLARY BLOOD SPEC: CPT | Performed by: INTERNAL MEDICINE

## 2022-01-13 NOTE — PROGRESS NOTES
Subjective   Stacey Rojo is a 81 y.o. female.     Chief Complaint   Patient presents with   • Anticoagulation       History of Present Illness   Here for follow up INR evaluation.  Currently anticoagulated with warfarin for atrial fibrillation.  The patient is taking warfarin 3 mg T, Th, S, S and 1.5 mg M,W,F.  The current INR goal is 2-3.  The current INR is 2.9.  No significant interval events such as easy bleeding, bruising, fever, weakness or numbness.  Last week got up to go to the bathroom and on way backward and landed on buttock and then the back of the head.  No LOC or bleeding.  Doing well since.    History of cholesterol.  Currently, has been feeling well without any myalgias, muscle aches, weakness, numbness, chest pain, short of breath or other issues.  Currently, is adherent with medication regimen of pravastatin 20 mg a day and denies medication side effects. Labs done 6/24/21.     History of hypertension.  Currently, has been feeling well and asymptomatic without any headaches, vision changes, cough, chest pain, shortness of breath, swelling, focal neurologic deficit, memory loss or syncope.  Has been taking the medications regularly and adherent with the regimen of diltiazem CD 180mg daily, lasix 20 mg BID and metoprolol XL 50 mg 3 tabs daily.  Denies medication side effects and no significant interval events. Labs done 6/24/21 and 7/21/21.     History of anxiety.  Doing well currently and sleeping well with no recent panic attacks.  Using the alprazolam as needed but is usually taking only one a day and no noted side effects.  Does not need refill at this time.    The following portions of the patient's history were reviewed and updated as appropriate: allergies, current medications, past family history, past medical history, past social history, past surgical history and problem list.    Depression Screen:  PHQ-2/PHQ-9 Depression Screening 2/11/2021   Little interest or pleasure in doing  things 0   Feeling down, depressed, or hopeless 0   Trouble falling or staying asleep, or sleeping too much 0   Feeling tired or having little energy 0   Poor appetite or overeating 0   Feeling bad about yourself - or that you are a failure or have let yourself or your family down 0   Trouble concentrating on things, such as reading the newspaper or watching television 0   Moving or speaking so slowly that other people could have noticed. Or the opposite - being so fidgety or restless that you have been moving around a lot more than usual 0   Thoughts that you would be better off dead, or of hurting yourself in some way 0   Total Score 0       Past Medical History:   Diagnosis Date   • A-fib ([facility])    • Abnormality of lung    • Acquired insufficiency of aortic valve    • Antral gastritis    • Anxiety    • Asymptomatic postmenopausal status    • Atrial fibrillation ([facility])    • Body mass index (BMI) 24.0-24.9, adult    • Chest pain, atypical    • COPD (chronic obstructive pulmonary disease) ([facility])    • Diverticulosis of colon    • ASHLEE (generalized anxiety disorder)    • GERD (gastroesophageal reflux disease)    • Heart disease    • Hiatal hernia    • High risk medication use    • Hyperactive bowel sounds    • Hyperlipidemia    • Hypertension    • Insomnia    • Intermittent claudication ([facility])    • Internal hemorrhoids    • Lower extremity edema    • Mitral insufficiency    • KOKO (obstructive sleep apnea)    • Osteopenia    • Pacemaker    • Pain of left patella    • Pedal edema    • Pulmonary hypertension ([facility])    • SSS (sick sinus syndrome) ([facility])    • Tricuspid regurgitation    • Urine frequency    • Uterovaginal prolapse    • Wheezing        Past Surgical History:   Procedure Laterality Date   • COLONOSCOPY  01/19/2015    diverticuli and hemorrhoids   • COLONOSCOPY  02/03/2009    diverticuli   • PACEMAKER IMPLANTATION     • PACEMAKER IMPLANTATION      SERIAL # [id number], model #[id number]   • UPPER GASTROINTESTINAL  ENDOSCOPY  02/03/2009    small hiatal hernia   • UPPER GASTROINTESTINAL ENDOSCOPY  01/19/2015    sliding small hiatal hernia       Family History   Problem Relation Age of Onset   • Cancer Mother         oral-mouth   • Coronary artery disease Mother    • Cancer Father         oral-mouth   • Cancer Brother         cholangiocarcinoma/ oral-mouth   • Leukemia Brother        Social History     Socioeconomic History   • Marital status:    Tobacco Use   • Smoking status: Former Smoker   • Smokeless tobacco: Never Used   Substance and Sexual Activity   • Alcohol use: No   • Drug use: No   • Sexual activity: Defer       Current Outpatient Medications   Medication Sig Dispense Refill   • ALPRAZolam (XANAX) 0.5 MG tablet Take 1 tablet by mouth 3 (Three) Times a Day As Needed for Anxiety. 90 tablet 1   • azelastine (ASTEPRO) 0.15 % solution nasal spray 2 sprays into the nostril(s) as directed by provider Daily. 1 each 3   • diclofenac (VOLTAREN) 1 % gel gel Apply 4 g topically to the appropriate area as directed 4 (Four) Times a Day As Needed (pain and arthralgia). 100 g 3   • digoxin (LANOXIN) 125 MCG tablet Take 1 tablet by mouth Daily. 90 tablet 1   • dilTIAZem CD (CARDIZEM CD) 180 MG 24 hr capsule Take 1 capsule by mouth Daily. 90 capsule 1   • erythromycin (ROMYCIN) 5 MG/GM ophthalmic ointment      • esomeprazole (nexIUM) 40 MG capsule TAKE ONE CAPSULE BY MOUTH DAILY 90 capsule 1   • estradiol (ESTRACE) 0.1 MG/GM vaginal cream Insert 1 g into the vagina 2 (Two) Times a Week.     • furosemide (LASIX) 20 MG tablet TAKE TWO TABLETS BY MOUTH EVERY MORNING AND TAKE ONE TABLET BY MOUTH EVERY EVENING 270 tablet 1   • KLOR-CON 20 MEQ CR tablet TAKE ONE TABLET BY MOUTH DAILY 30 tablet 3   • lidocaine (XYLOCAINE) 5 % ointment APPLY TOPICALLY TO THE APPROPRIATE AREA AS DIRECTED THREE TIMES DAILY 35.44 each 1   • metoprolol succinate XL (TOPROL-XL) 50 MG 24 hr tablet Take 1 tablet by mouth Daily. 90 tablet 1   • olopatadine  "(PATANOL) 0.1 % ophthalmic solution Administer 1 drop to both eyes 2 (Two) Times a Day. 5 mL 1   • pravastatin (PRAVACHOL) 20 MG tablet TAKE ONE TABLET BY MOUTH DAILY 30 tablet 0   • PROBIOTIC PRODUCT PO Take  by mouth.     • tobramycin-dexamethasone (TOBRADEX) 0.3-0.1 % ophthalmic suspension      • warfarin (COUMADIN) 3 MG tablet TAKE ONE TABLET BY MOUTH DAILY 90 tablet 0     No current facility-administered medications for this visit.       Review of Systems   Constitutional: Negative for activity change, appetite change, fatigue, fever, unexpected weight gain and unexpected weight loss.   HENT: Negative for nosebleeds, rhinorrhea, trouble swallowing and voice change.    Eyes: Negative for visual disturbance.   Respiratory: Negative for cough, chest tightness, shortness of breath and wheezing.    Cardiovascular: Negative for chest pain, palpitations and leg swelling.   Gastrointestinal: Negative for abdominal pain, blood in stool, constipation, diarrhea, nausea, vomiting, GERD and indigestion.   Genitourinary: Negative for dysuria, frequency and hematuria.   Musculoskeletal: Negative for arthralgias, back pain and myalgias.   Skin: Negative for rash and wound.   Neurological: Negative for dizziness, tremors, weakness, light-headedness, numbness, headache and memory problem.   Hematological: Negative for adenopathy. Does not bruise/bleed easily.   Psychiatric/Behavioral: Negative for sleep disturbance and depressed mood. The patient is not nervous/anxious.        Objective   /80 (BP Location: Right arm, Patient Position: Sitting, Cuff Size: Adult)   Pulse 69   Temp 97.7 °F (36.5 °C) (Temporal)   Ht 154.9 cm (60.98\")   Wt 52.2 kg (115 lb)   SpO2 98%   BMI 21.74 kg/m²     Physical Exam  Vitals and nursing note reviewed.   Constitutional:       General: She is not in acute distress.     Appearance: She is well-developed. She is not diaphoretic.   HENT:      Head: Normocephalic and atraumatic.      Right " Ear: External ear normal.      Left Ear: External ear normal.      Nose: Nose normal.   Eyes:      Conjunctiva/sclera: Conjunctivae normal.      Pupils: Pupils are equal, round, and reactive to light.   Neck:      Thyroid: No thyromegaly.      Trachea: No tracheal deviation.   Cardiovascular:      Rate and Rhythm: Normal rate and regular rhythm.      Heart sounds: Normal heart sounds. No murmur heard.  No friction rub. No gallop.    Pulmonary:      Effort: Pulmonary effort is normal. No respiratory distress.      Breath sounds: Normal breath sounds.   Abdominal:      General: Bowel sounds are normal.      Palpations: Abdomen is soft. There is no mass.      Tenderness: There is no abdominal tenderness. There is no guarding.   Musculoskeletal:         General: Normal range of motion.      Cervical back: Normal range of motion and neck supple.   Lymphadenopathy:      Cervical: No cervical adenopathy.   Skin:     General: Skin is warm and dry.      Capillary Refill: Capillary refill takes less than 2 seconds.      Findings: No rash.   Neurological:      Mental Status: She is alert and oriented to person, place, and time.      Motor: No abnormal muscle tone.      Deep Tendon Reflexes: Reflexes normal.   Psychiatric:         Behavior: Behavior normal.         Thought Content: Thought content normal.         Judgment: Judgment normal.       Recent Results (from the past 2016 hour(s))   POC INR    Collection Time: 11/18/21 10:42 AM    Specimen: Blood   Result Value Ref Range    INR 2.10 2 - 3   POC INR    Collection Time: 12/02/21  9:30 AM    Specimen: Blood   Result Value Ref Range    INR 2.90 2 - 3   POC INR    Collection Time: 01/13/22 10:26 AM    Specimen: Blood   Result Value Ref Range    INR 2.90 (A) 2 - 3     Assessment/Plan   Diagnoses and all orders for this visit:    1. Anticoagulated on warfarin (Primary)  -     POC INR    2. Atrial fibrillation, unspecified type (HCC)    3. Primary hypertension      Continue the  current warfarin 1.5 mg M, W, F and 3 mg rest of week.  Repeat INR in 4-6 weeks.  Hypertension controlled and no changes needed.  Post fall without side effects.         · COVID-19 Precautions - Patient was compliant in wearing a mask. When I saw the patient, I used appropriate personal protective equipment (PPE) including mask and eye shield (standard procedure).  Additionally, I used gown and gloves if indicated.  Hand hygiene was completed before and after seeing the patient.  · Dictated utilizing Dragon Dictation

## 2022-01-22 DIAGNOSIS — E78.5 HYPERLIPIDEMIA, UNSPECIFIED HYPERLIPIDEMIA TYPE: ICD-10-CM

## 2022-01-25 RX ORDER — PRAVASTATIN SODIUM 20 MG
TABLET ORAL
Qty: 30 TABLET | Refills: 0 | Status: SHIPPED | OUTPATIENT
Start: 2022-01-25 | End: 2022-02-23

## 2022-01-30 DIAGNOSIS — I10 ESSENTIAL HYPERTENSION: ICD-10-CM

## 2022-01-31 RX ORDER — FUROSEMIDE 20 MG/1
TABLET ORAL
Qty: 270 TABLET | Refills: 1 | Status: SHIPPED | OUTPATIENT
Start: 2022-01-31 | End: 2022-08-02

## 2022-01-31 NOTE — TELEPHONE ENCOUNTER
Rx Refill Note  Requested Prescriptions     Pending Prescriptions Disp Refills   • furosemide (LASIX) 20 MG tablet [Pharmacy Med Name: FUROSEMIDE 20 MG TABLET] 270 tablet 1     Sig: TAKE TWO TABLETS BY MOUTH EVERY MORNING AND TAKE ONE TABLET BY MOUTH EVERY EVENING      Last office visit with prescribing clinician: 1/13/2022      Next office visit with prescribing clinician: Visit date not found            Quin Denson MA  01/31/22, 16:31 EST

## 2022-02-10 ENCOUNTER — CLINICAL SUPPORT (OUTPATIENT)
Dept: FAMILY MEDICINE CLINIC | Facility: CLINIC | Age: 82
End: 2022-02-10

## 2022-02-10 DIAGNOSIS — K21.9 GASTROESOPHAGEAL REFLUX DISEASE WITHOUT ESOPHAGITIS: ICD-10-CM

## 2022-02-10 LAB — INR PPP: 3.2 (ref 2–3)

## 2022-02-10 PROCEDURE — 85610 PROTHROMBIN TIME: CPT | Performed by: INTERNAL MEDICINE

## 2022-02-10 PROCEDURE — 36416 COLLJ CAPILLARY BLOOD SPEC: CPT | Performed by: INTERNAL MEDICINE

## 2022-02-14 RX ORDER — CONJUGATED ESTROGENS 0.62 MG/G
CREAM VAGINAL
COMMUNITY
Start: 2021-09-17

## 2022-02-14 RX ORDER — ESOMEPRAZOLE MAGNESIUM 40 MG/1
CAPSULE, DELAYED RELEASE ORAL
Qty: 90 CAPSULE | Refills: 1 | Status: SHIPPED | OUTPATIENT
Start: 2022-02-14 | End: 2022-08-15

## 2022-02-15 NOTE — TELEPHONE ENCOUNTER
Rx Refill Note  Requested Prescriptions     Pending Prescriptions Disp Refills   • esomeprazole (nexIUM) 40 MG capsule [Pharmacy Med Name: ESOMEPRAZOLE MAG DR 40 MG CAP] 90 capsule 1     Sig: TAKE ONE CAPSULE BY MOUTH DAILY      Last office visit with prescribing clinician: 1/13/2022      Next office visit with prescribing clinician: 2/24/2022            Jayesh Brewer  02/14/22, 21:46 EST

## 2022-02-23 DIAGNOSIS — E87.6 HYPOKALEMIA: ICD-10-CM

## 2022-02-23 DIAGNOSIS — E78.5 HYPERLIPIDEMIA, UNSPECIFIED HYPERLIPIDEMIA TYPE: ICD-10-CM

## 2022-02-23 RX ORDER — PRAVASTATIN SODIUM 20 MG
TABLET ORAL
Qty: 30 TABLET | Refills: 0 | Status: SHIPPED | OUTPATIENT
Start: 2022-02-23 | End: 2022-02-24 | Stop reason: SDUPTHER

## 2022-02-23 RX ORDER — POTASSIUM CHLORIDE 1500 MG/1
TABLET, EXTENDED RELEASE ORAL
Qty: 30 TABLET | Refills: 3 | Status: SHIPPED | OUTPATIENT
Start: 2022-02-23 | End: 2022-02-24 | Stop reason: SDUPTHER

## 2022-02-23 NOTE — TELEPHONE ENCOUNTER
Rx Refill Note  Requested Prescriptions     Pending Prescriptions Disp Refills   • KLOR-CON 20 MEQ CR tablet [Pharmacy Med Name: KLOR-CON M20 TABLET] 30 tablet 3     Sig: TAKE ONE TABLET BY MOUTH DAILY   • pravastatin (PRAVACHOL) 20 MG tablet [Pharmacy Med Name: PRAVASTATIN SODIUM 20 MG TAB] 30 tablet 0     Sig: TAKE ONE TABLET BY MOUTH DAILY      Last office visit with prescribing clinician: 1/13/2022      Next office visit with prescribing clinician: 2/24/2022            Quin Denson MA  02/23/22, 16:51 EST

## 2022-02-24 ENCOUNTER — OFFICE VISIT (OUTPATIENT)
Dept: FAMILY MEDICINE CLINIC | Facility: CLINIC | Age: 82
End: 2022-02-24

## 2022-02-24 VITALS
HEIGHT: 61 IN | BODY MASS INDEX: 21.9 KG/M2 | TEMPERATURE: 97.6 F | HEART RATE: 88 BPM | WEIGHT: 116 LBS | DIASTOLIC BLOOD PRESSURE: 68 MMHG | OXYGEN SATURATION: 96 % | SYSTOLIC BLOOD PRESSURE: 132 MMHG

## 2022-02-24 DIAGNOSIS — Z79.01 ANTICOAGULATED ON WARFARIN: ICD-10-CM

## 2022-02-24 DIAGNOSIS — E87.6 HYPOKALEMIA: ICD-10-CM

## 2022-02-24 DIAGNOSIS — Z00.00 MEDICARE ANNUAL WELLNESS VISIT, SUBSEQUENT: Primary | ICD-10-CM

## 2022-02-24 DIAGNOSIS — I10 PRIMARY HYPERTENSION: ICD-10-CM

## 2022-02-24 DIAGNOSIS — E78.5 HYPERLIPIDEMIA, UNSPECIFIED HYPERLIPIDEMIA TYPE: ICD-10-CM

## 2022-02-24 LAB — INR PPP: 2.5 (ref 2–3)

## 2022-02-24 PROCEDURE — 85610 PROTHROMBIN TIME: CPT | Performed by: INTERNAL MEDICINE

## 2022-02-24 PROCEDURE — 36416 COLLJ CAPILLARY BLOOD SPEC: CPT | Performed by: INTERNAL MEDICINE

## 2022-02-24 PROCEDURE — G0439 PPPS, SUBSEQ VISIT: HCPCS | Performed by: INTERNAL MEDICINE

## 2022-02-24 PROCEDURE — 1159F MED LIST DOCD IN RCRD: CPT | Performed by: INTERNAL MEDICINE

## 2022-02-24 PROCEDURE — 1170F FXNL STATUS ASSESSED: CPT | Performed by: INTERNAL MEDICINE

## 2022-02-24 RX ORDER — POTASSIUM CHLORIDE 20 MEQ/1
20 TABLET, EXTENDED RELEASE ORAL DAILY
Qty: 90 TABLET | Refills: 3 | Status: SHIPPED | OUTPATIENT
Start: 2022-02-24 | End: 2022-06-20 | Stop reason: SDUPTHER

## 2022-02-24 RX ORDER — PRAVASTATIN SODIUM 20 MG
20 TABLET ORAL DAILY
Qty: 90 TABLET | Refills: 3 | Status: SHIPPED | OUTPATIENT
Start: 2022-02-24 | End: 2023-03-20

## 2022-02-24 RX ORDER — METOPROLOL SUCCINATE 50 MG/1
50 TABLET, EXTENDED RELEASE ORAL DAILY
Qty: 90 TABLET | Refills: 1 | Status: SHIPPED | OUTPATIENT
Start: 2022-02-24 | End: 2022-08-18 | Stop reason: SDUPTHER

## 2022-02-24 NOTE — PROGRESS NOTES
Subsequent Medicare Wellness Visit   The ABC's of the Annual Wellness Visit    Chief Complaint   Patient presents with   • Annual Exam   • Anticoagulation       HPI:  Stacey Rojo, -1940, is a 81 y.o. female who presents for a Subsequent Medicare Wellness Visit.    Here for follow up INR evaluation.  Currently anticoagulated with warfarin for atrial fibrillation.  The patient is taking warfarin 3 mg T, Th, S, S and 1.5 mg M,W,F.  The current INR goal is 2-3.  The current INR is 2.5.  No significant interval events such as easy bleeding, bruising, fever, weakness or numbness.  Last week got up to go to the bathroom and on way backward and landed on buttock and then the back of the head.  No LOC or bleeding.  Doing well since.     History of cholesterol.  Currently, has been feeling well without any myalgias, muscle aches, weakness, numbness, chest pain, short of breath or other issues.  Currently, is adherent with medication regimen of pravastatin 20 mg a day and denies medication side effects. Labs done 21.     History of hypertension.  Currently, has been feeling well and asymptomatic without any headaches, vision changes, cough, chest pain, shortness of breath, swelling, focal neurologic deficit, memory loss or syncope.  Has been taking the medications regularly and adherent with the regimen of diltiazem CD 180mg daily, lasix 20 mg BID and metoprolol XL 50 mg 3 tabs daily.  Denies medication side effects and no significant interval events. Labs done 21 and 21.     History of anxiety.  Doing well currently and sleeping well with no recent panic attacks.  Using the alprazolam as needed but is usually taking only one a day and no noted side effects.  Does not need refill at this time.     Recent Hospitalizations:  No hospitalization(s) within the last year..    Current Medical Providers:  Patient Care Team:  Serg Varela MD as PCP - General (Internal Medicine)  Charly Welsh MD as  Consulting Physician (Cardiology)  Sivakumar Keller MD as Consulting Physician (Otolaryngology)  Rinku Mendoza MD as Consulting Physician (Obstetrics and Gynecology)    Health Habits and Functional and Cognitive Screening and Depression Screening:  Functional & Cognitive Status 2/24/2022   Do you have difficulty preparing food and eating? No   Do you have difficulty bathing yourself, getting dressed or grooming yourself? No   Do you have difficulty using the toilet? No   Do you have difficulty moving around from place to place? No   Do you have trouble with steps or getting out of a bed or a chair? No   Current Diet Well Balanced Diet   Dental Exam Up to date   Eye Exam Up to date   Exercise (times per week) 0 times per week   Current Exercise Activities Include -   Do you need help using the phone?  No   Are you deaf or do you have serious difficulty hearing?  No   Do you need help with transportation? No   Do you need help shopping? No   Do you need help preparing meals?  No   Do you need help with housework?  No   Do you need help with laundry? No   Do you need help taking your medications? No   Do you need help managing money? No   Do you ever drive or ride in a car without wearing a seat belt? No   Have you felt unusual stress, anger or loneliness in the last month? No   Who do you live with? Child   If you need help, do you have trouble finding someone available to you? No   Have you been bothered in the last four weeks by sexual problems? No   Do you have difficulty concentrating, remembering or making decisions? No       Compared to one year ago, the patient feels her physical health is the same and her mental health is the same.    Depression Screen:  PHQ-2/PHQ-9 Depression Screening 2/24/2022   Little interest or pleasure in doing things 0   Feeling down, depressed, or hopeless 0   Trouble falling or staying asleep, or sleeping too much 0   Feeling tired or having little energy 0   Poor  appetite or overeating 0   Feeling bad about yourself - or that you are a failure or have let yourself or your family down 0   Trouble concentrating on things, such as reading the newspaper or watching television 0   Moving or speaking so slowly that other people could have noticed. Or the opposite - being so fidgety or restless that you have been moving around a lot more than usual 0   Thoughts that you would be better off dead, or of hurting yourself in some way 0   Total Score 0       Falls Risk Assessment:  PURVI Fall Risk Clinician Key Questions   Have you fallen in the past year?: Yes  Do you feel unsteady with walking?: No  Are you worried about falling?: No      Past Medical/Family/Social History:  The following portions of the patient's history were reviewed and updated as appropriate: allergies, current medications, past family history, past medical history, past social history, past surgical history and problem list.    Allergies   Allergen Reactions   • Rosuvastatin Calcium Myalgia         Current Outpatient Medications:   •  ALPRAZolam (XANAX) 0.5 MG tablet, Take 1 tablet by mouth 3 (Three) Times a Day As Needed for Anxiety., Disp: 90 tablet, Rfl: 1  •  azelastine (ASTEPRO) 0.15 % solution nasal spray, 2 sprays into the nostril(s) as directed by provider Daily., Disp: 1 each, Rfl: 3  •  conjugated estrogens (Premarin) 0.625 MG/GM vaginal cream, , Disp: , Rfl:   •  diclofenac (VOLTAREN) 1 % gel gel, Apply 4 g topically to the appropriate area as directed 4 (Four) Times a Day As Needed (pain and arthralgia)., Disp: 100 g, Rfl: 3  •  digoxin (LANOXIN) 125 MCG tablet, Take 1 tablet by mouth Daily., Disp: 90 tablet, Rfl: 1  •  dilTIAZem CD (CARDIZEM CD) 180 MG 24 hr capsule, Take 1 capsule by mouth Daily., Disp: 90 capsule, Rfl: 1  •  erythromycin (ROMYCIN) 5 MG/GM ophthalmic ointment, , Disp: , Rfl:   •  esomeprazole (nexIUM) 40 MG capsule, TAKE ONE CAPSULE BY MOUTH DAILY, Disp: 90 capsule, Rfl: 1  •   estradiol (ESTRACE) 0.1 MG/GM vaginal cream, Insert 1 g into the vagina 2 (Two) Times a Week., Disp: , Rfl:   •  furosemide (LASIX) 20 MG tablet, TAKE TWO TABLETS BY MOUTH EVERY MORNING AND TAKE ONE TABLET BY MOUTH EVERY EVENING, Disp: 270 tablet, Rfl: 1  •  lidocaine (XYLOCAINE) 5 % ointment, APPLY TOPICALLY TO THE APPROPRIATE AREA AS DIRECTED THREE TIMES DAILY, Disp: 35.44 each, Rfl: 1  •  metoprolol succinate XL (TOPROL-XL) 50 MG 24 hr tablet, Take 1 tablet by mouth Daily., Disp: 90 tablet, Rfl: 1  •  olopatadine (PATANOL) 0.1 % ophthalmic solution, Administer 1 drop to both eyes 2 (Two) Times a Day., Disp: 5 mL, Rfl: 1  •  potassium chloride (KLOR-CON) 20 MEQ CR tablet, Take 1 tablet by mouth Daily., Disp: 90 tablet, Rfl: 3  •  pravastatin (PRAVACHOL) 20 MG tablet, Take 1 tablet by mouth Daily., Disp: 90 tablet, Rfl: 3  •  PROBIOTIC PRODUCT PO, Take  by mouth., Disp: , Rfl:   •  tobramycin-dexamethasone (TOBRADEX) 0.3-0.1 % ophthalmic suspension, , Disp: , Rfl:   •  warfarin (COUMADIN) 3 MG tablet, TAKE ONE TABLET BY MOUTH DAILY, Disp: 90 tablet, Rfl: 0    Aspirin use counseling: Does not need ASA (and currently is not on it)    Current medication list contains no high risk medications.  No harmful drug interactions have been identified.     Family History   Problem Relation Age of Onset   • Cancer Mother         oral-mouth   • Coronary artery disease Mother    • Cancer Father         oral-mouth   • Cancer Brother         cholangiocarcinoma/ oral-mouth   • Leukemia Brother        Social History     Tobacco Use   • Smoking status: Former Smoker   • Smokeless tobacco: Never Used   Substance Use Topics   • Alcohol use: No       Past Surgical History:   Procedure Laterality Date   • COLONOSCOPY  01/19/2015    diverticuli and hemorrhoids   • COLONOSCOPY  02/03/2009    diverticuli   • PACEMAKER IMPLANTATION     • PACEMAKER IMPLANTATION      SERIAL # [id number], model #[id number]   • UPPER GASTROINTESTINAL ENDOSCOPY   02/03/2009    small hiatal hernia   • UPPER GASTROINTESTINAL ENDOSCOPY  01/19/2015    sliding small hiatal hernia       Patient Active Problem List   Diagnosis   • Anticoagulated on warfarin   • A-fib (HCC)   • Hypertension   • Anxiety   • Hyperlipidemia   • GERD (gastroesophageal reflux disease)   • Heart disease   • Arthralgia of right ankle   • Atrophic vaginitis   • Cervical polyp   • Pacemaker   • Medicare annual wellness visit, subsequent   • Allergic conjunctivitis of both eyes   • Screening mammogram, encounter for   • Neuropathic pain of right ankle   • Allergic rhinitis   • Hordeolum externum of right lower eyelid   • Eyelid abnormality   • Weight loss, abnormal       Review of Systems   Constitutional: Negative for activity change, appetite change, fatigue, fever, unexpected weight gain and unexpected weight loss.   HENT: Negative for nosebleeds, rhinorrhea, trouble swallowing and voice change.    Eyes: Negative for visual disturbance.   Respiratory: Negative for cough, chest tightness, shortness of breath and wheezing.    Cardiovascular: Negative for chest pain, palpitations and leg swelling.   Gastrointestinal: Negative for abdominal pain, blood in stool, constipation, diarrhea, nausea, vomiting, GERD and indigestion.   Genitourinary: Negative for dysuria, frequency and hematuria.   Musculoskeletal: Negative for arthralgias, back pain and myalgias.   Skin: Negative for rash and wound.   Neurological: Negative for dizziness, tremors, weakness, light-headedness, numbness, headache and memory problem.   Hematological: Negative for adenopathy. Does not bruise/bleed easily.   Psychiatric/Behavioral: Negative for sleep disturbance and depressed mood. The patient is not nervous/anxious.        Objective     Vitals:    02/24/22 1108   BP: 132/68   BP Location: Right arm   Patient Position: Sitting   Cuff Size: Adult   Pulse: 88   Temp: 97.6 °F (36.4 °C)   TempSrc: Temporal   SpO2: 96%   Weight: 52.6 kg (116 lb)  "  Height: 154.9 cm (60.98\")       Patient's Body mass index is 21.93 kg/m². indicating that she is within normal range (BMI 18.5-24.9). No BMI management plan needed..      No exam data present    The patient has no evidence of cognitve impairment.     Physical Exam  Vitals and nursing note reviewed.   Constitutional:       General: She is not in acute distress.     Appearance: She is well-developed. She is not diaphoretic.   HENT:      Head: Normocephalic and atraumatic.      Right Ear: External ear normal.      Left Ear: External ear normal.      Nose: Nose normal.   Eyes:      Conjunctiva/sclera: Conjunctivae normal.      Pupils: Pupils are equal, round, and reactive to light.   Neck:      Thyroid: No thyromegaly.      Trachea: No tracheal deviation.   Cardiovascular:      Rate and Rhythm: Normal rate and regular rhythm.      Heart sounds: Normal heart sounds. No murmur heard.  No friction rub. No gallop.    Pulmonary:      Effort: Pulmonary effort is normal. No respiratory distress.      Breath sounds: Normal breath sounds.   Abdominal:      General: Bowel sounds are normal.      Palpations: Abdomen is soft. There is no mass.      Tenderness: There is no abdominal tenderness. There is no guarding.   Musculoskeletal:         General: Normal range of motion.      Cervical back: Normal range of motion and neck supple.   Lymphadenopathy:      Cervical: No cervical adenopathy.   Skin:     General: Skin is warm and dry.      Capillary Refill: Capillary refill takes less than 2 seconds.      Findings: No rash.   Neurological:      Mental Status: She is alert and oriented to person, place, and time.      Motor: No abnormal muscle tone.      Deep Tendon Reflexes: Reflexes normal.   Psychiatric:         Behavior: Behavior normal.         Thought Content: Thought content normal.         Judgment: Judgment normal.         Recent Lab Results:     Lab Results   Component Value Date    TRIG 77 06/24/2021    HDL 46 06/24/2021 "    VLDL 15 06/24/2021       Assessment/Plan   Age-appropriate Screening Schedule:  Refer to the list below for future screening recommendations based on patient's age, sex and/or medical conditions.      Health Maintenance   Topic Date Due   • TDAP/TD VACCINES (1 - Tdap) Never done   • ZOSTER VACCINE (1 of 2) Never done   • DXA SCAN  08/02/2019   • LIPID PANEL  06/24/2022   • INFLUENZA VACCINE  Completed       Medicare Risks and Personalized Health Plan:  Advance Directive Discussion  Fall Risk  Glaucoma Risk  Immunizations Discussed/Encouraged (specific immunizations; Tdap and Shingrix )  Osteoporosis Risk      CMS-Preventive Services Quick Reference  Medicare Preventive Services Addressed:  Annual Wellness Visit (AWV)  Glaucoma screening (for individuals with diabetes mellitus, family history of glaucoma, -Americans (> or =) age 50, -Americans (> or =) age 65)    Advance Care Planning:  ACP discussion was held with the patient during this visit. Patient has an advance directive (not in EMR), copy requested.    Diagnoses and all orders for this visit:    1. Medicare annual wellness visit, subsequent (Primary)    2. Hyperlipidemia, unspecified hyperlipidemia type  -     Comprehensive Metabolic Panel  -     Lipid Panel  -     pravastatin (PRAVACHOL) 20 MG tablet; Take 1 tablet by mouth Daily.  Dispense: 90 tablet; Refill: 3    3. Anticoagulated on warfarin  -     CBC & Differential    4. Primary hypertension  -     Comprehensive Metabolic Panel  -     Lipid Panel  -     CBC & Differential    5. Hypokalemia  -     Comprehensive Metabolic Panel  -     potassium chloride (KLOR-CON) 20 MEQ CR tablet; Take 1 tablet by mouth Daily.  Dispense: 90 tablet; Refill: 3    Other orders  -     POC INR  -     metoprolol succinate XL (TOPROL-XL) 50 MG 24 hr tablet; Take 1 tablet by mouth Daily.  Dispense: 90 tablet; Refill: 1      Reviewed history and annual wellness visit with patient during office time.  Medications  reviewed as appropriate.  Discussed advanced directives and living will.  Patient has living will: Living will: yes and patient will bring copy to office.  Discussed fall risk and precautions encourage removing throw rugs and using grab bars within the home and bathroom.  Will check the labs as ordered above to evaluate the blood sugars, kidney, liver, cholesterol for screening.  Discussed flu shot recommended to get the high-dose influenza vaccine annually in the fall.  The patient has a COVID HM Topic on their chart, and they are fully vaccinated..  Prevnar-13 and pneumovax-23 up to date and appropriate.  Shingrix vaccination series recommended.  Encourage follow-up with the eye doctor on annual basis for glaucoma evaluation.  Discussed weight and encouraged exercise as tolerated while following a healthy diet.  Recommend to get annual mammograms.  Follow-up with gynecology and specialists as scheduled.      Continue the current warfarin unchanged and recheck in 4 weeks.    An After Visit Summary and PPPS with all of these plans were given to the patient.      Follow Up:  Return in about 6 months (around 8/24/2022) for Next scheduled follow up.           · COVID-19 Precautions - Patient was compliant in wearing a mask. When I saw the patient, I used appropriate personal protective equipment (PPE) including mask and eye shield (standard procedure).  Additionally, I used gown and gloves if indicated.  Hand hygiene was completed before and after seeing the patient.  · Dictated utilizing Dragon Dictation

## 2022-02-24 NOTE — PATIENT INSTRUCTIONS
Medicare Wellness  Personal Prevention Plan of Service     Date of Office Visit:  2022  Encounter Provider:  Serg Varela MD  Place of Service:  [facility]  Patient Name: Stacey Rojo  :  1940    As part of the Medicare Wellness portion of your visit today, we are providing you with this personalized preventive plan of services (PPPS). This plan is based upon recommendations of the [place] Preventive Services Task Force (USPSTF) and the Advisory Committee on Immunization Practices (ACIP).    This lists the preventive care services that should be considered, and provides dates of when you are due. Items listed as completed are up-to-date and do not require any further intervention.    Health Maintenance   Topic Date Due   • TDAP/TD VACCINES (1 - Tdap) Never done   • ZOSTER VACCINE (1 of 2) Never done   • DXA SCAN  2019   • LIPID PANEL  2022   • ANNUAL WELLNESS VISIT  2023   • COVID-19 Vaccine  Completed   • INFLUENZA VACCINE  Completed   • Pneumococcal Vaccine 65+  Completed   • HEPATITIS C SCREENING  Discontinued       Orders Placed This Encounter   Procedures   • Comprehensive Metabolic Panel     Order Specific Question:   Release to patient     Answer:   Immediate   • Lipid Panel   • POC INR     This is an external result entered through the Results Console.     Order Specific Question:   Release to patient     Answer:   Immediate   • CBC & Differential     Order Specific Question:   Manual Differential     Answer:   No       Return in about 6 months (around 2022) for Next scheduled follow up.

## 2022-02-25 LAB
ALBUMIN SERPL-MCNC: 4.4 G/DL (ref 3.7–4.7)
ALBUMIN/GLOB SERPL: 1.4 {RATIO} (ref 1.2–2.2)
ALP SERPL-CCNC: 76 IU/L (ref 44–121)
ALT SERPL-CCNC: 12 IU/L (ref 0–32)
AST SERPL-CCNC: 18 IU/L (ref 0–40)
BASOPHILS # BLD AUTO: 0.1 X10E3/UL (ref 0–0.2)
BASOPHILS NFR BLD AUTO: 1 %
BILIRUB SERPL-MCNC: 0.4 MG/DL (ref 0–1.2)
BUN SERPL-MCNC: 16 MG/DL (ref 8–27)
BUN/CREAT SERPL: 22 (ref 12–28)
CALCIUM SERPL-MCNC: 9.4 MG/DL (ref 8.7–10.3)
CHLORIDE SERPL-SCNC: 101 MMOL/L (ref 96–106)
CHOLEST SERPL-MCNC: 171 MG/DL (ref 100–199)
CO2 SERPL-SCNC: 26 MMOL/L (ref 20–29)
CREAT SERPL-MCNC: 0.74 MG/DL (ref 0.57–1)
EOSINOPHIL # BLD AUTO: 0.2 X10E3/UL (ref 0–0.4)
EOSINOPHIL NFR BLD AUTO: 2 %
ERYTHROCYTE [DISTWIDTH] IN BLOOD BY AUTOMATED COUNT: 13.6 % (ref 11.7–15.4)
GLOBULIN SER CALC-MCNC: 3.1 G/DL (ref 1.5–4.5)
GLUCOSE SERPL-MCNC: 91 MG/DL (ref 65–99)
HCT VFR BLD AUTO: 34.5 % (ref 34–46.6)
HDLC SERPL-MCNC: 51 MG/DL
HGB BLD-MCNC: 11 G/DL (ref 11.1–15.9)
IMM GRANULOCYTES # BLD AUTO: 0 X10E3/UL (ref 0–0.1)
IMM GRANULOCYTES NFR BLD AUTO: 0 %
LDLC SERPL CALC-MCNC: 95 MG/DL (ref 0–99)
LYMPHOCYTES # BLD AUTO: 2.5 X10E3/UL (ref 0.7–3.1)
LYMPHOCYTES NFR BLD AUTO: 33 %
MCH RBC QN AUTO: 26.1 PG (ref 26.6–33)
MCHC RBC AUTO-ENTMCNC: 31.9 G/DL (ref 31.5–35.7)
MCV RBC AUTO: 82 FL (ref 79–97)
MONOCYTES # BLD AUTO: 0.7 X10E3/UL (ref 0.1–0.9)
MONOCYTES NFR BLD AUTO: 10 %
NEUTROPHILS # BLD AUTO: 4 X10E3/UL (ref 1.4–7)
NEUTROPHILS NFR BLD AUTO: 54 %
PLATELET # BLD AUTO: 310 X10E3/UL (ref 150–450)
POTASSIUM SERPL-SCNC: 4.3 MMOL/L (ref 3.5–5.2)
PROT SERPL-MCNC: 7.5 G/DL (ref 6–8.5)
RBC # BLD AUTO: 4.21 X10E6/UL (ref 3.77–5.28)
SODIUM SERPL-SCNC: 142 MMOL/L (ref 134–144)
TRIGL SERPL-MCNC: 141 MG/DL (ref 0–149)
VLDLC SERPL CALC-MCNC: 25 MG/DL (ref 5–40)
WBC # BLD AUTO: 7.5 X10E3/UL (ref 3.4–10.8)

## 2022-03-24 ENCOUNTER — CLINICAL SUPPORT (OUTPATIENT)
Dept: FAMILY MEDICINE CLINIC | Facility: CLINIC | Age: 82
End: 2022-03-24

## 2022-03-24 DIAGNOSIS — Z79.01 ANTICOAGULATED ON WARFARIN: Primary | ICD-10-CM

## 2022-03-24 DIAGNOSIS — Z79.01 ANTICOAGULATED ON WARFARIN: ICD-10-CM

## 2022-03-24 DIAGNOSIS — I48.20 CHRONIC ATRIAL FIBRILLATION: ICD-10-CM

## 2022-03-24 LAB — INR PPP: 2.6 (ref 2–3)

## 2022-03-24 PROCEDURE — 85610 PROTHROMBIN TIME: CPT | Performed by: INTERNAL MEDICINE

## 2022-03-24 PROCEDURE — 36416 COLLJ CAPILLARY BLOOD SPEC: CPT | Performed by: INTERNAL MEDICINE

## 2022-03-25 RX ORDER — WARFARIN SODIUM 3 MG/1
3 TABLET ORAL DAILY
Qty: 90 TABLET | Refills: 0 | Status: SHIPPED | OUTPATIENT
Start: 2022-03-25 | End: 2022-04-04

## 2022-03-25 NOTE — TELEPHONE ENCOUNTER
Rx Refill Note  Requested Prescriptions     Pending Prescriptions Disp Refills   • warfarin (COUMADIN) 3 MG tablet 90 tablet 0     Sig: Take 1 tablet by mouth Daily.      Last office visit with prescribing clinician: 2/24/2022      Next office visit with prescribing clinician: Visit date not found            Dmitriy Brower MA  03/25/22, 14:37 EDT

## 2022-03-28 DIAGNOSIS — F41.9 ANXIETY: ICD-10-CM

## 2022-03-28 RX ORDER — ALPRAZOLAM 0.5 MG/1
0.5 TABLET ORAL 3 TIMES DAILY PRN
Qty: 90 TABLET | Refills: 1 | Status: SHIPPED | OUTPATIENT
Start: 2022-03-28 | End: 2022-05-19 | Stop reason: SDUPTHER

## 2022-03-28 NOTE — TELEPHONE ENCOUNTER
Rx Refill Note  Requested Prescriptions     Pending Prescriptions Disp Refills   • ALPRAZolam (XANAX) 0.5 MG tablet 90 tablet 1     Sig: Take 1 tablet by mouth 3 (Three) Times a Day As Needed for Anxiety.      Last office visit with prescribing clinician: 2/24/2022      Next office visit with prescribing clinician: due 8/2022            Last filled 11/29/2021           Marie Atkinson MA  03/28/22, 16:13 EDT

## 2022-04-02 DIAGNOSIS — Z79.01 ANTICOAGULATED ON WARFARIN: ICD-10-CM

## 2022-04-02 DIAGNOSIS — I48.20 CHRONIC ATRIAL FIBRILLATION: ICD-10-CM

## 2022-04-04 RX ORDER — WARFARIN SODIUM 3 MG/1
TABLET ORAL
Qty: 90 TABLET | Refills: 0 | Status: SHIPPED | OUTPATIENT
Start: 2022-04-04 | End: 2022-06-13 | Stop reason: SDUPTHER

## 2022-04-11 RX ORDER — DIGOXIN 125 MCG
TABLET ORAL
Qty: 90 TABLET | Refills: 1 | Status: SHIPPED | OUTPATIENT
Start: 2022-04-11 | End: 2022-10-06

## 2022-04-11 NOTE — TELEPHONE ENCOUNTER
Rx Refill Note  Requested Prescriptions     Pending Prescriptions Disp Refills   • digoxin (LANOXIN) 125 MCG tablet [Pharmacy Med Name: DIGOXIN 0.125 MG TABLET] 90 tablet 1     Sig: TAKE ONE TABLET BY MOUTH DAILY      Last office visit with prescribing clinician: 2/24/2022      Next office visit with prescribing clinician: Visit date not found            Quin Denson MA  04/11/22, 08:58 EDT

## 2022-04-28 ENCOUNTER — CLINICAL SUPPORT (OUTPATIENT)
Dept: FAMILY MEDICINE CLINIC | Facility: CLINIC | Age: 82
End: 2022-04-28

## 2022-04-28 DIAGNOSIS — I48.20 CHRONIC ATRIAL FIBRILLATION: Primary | ICD-10-CM

## 2022-04-28 LAB — INR PPP: 2.1 (ref 2–3)

## 2022-04-28 PROCEDURE — 85610 PROTHROMBIN TIME: CPT | Performed by: INTERNAL MEDICINE

## 2022-04-28 PROCEDURE — 36416 COLLJ CAPILLARY BLOOD SPEC: CPT | Performed by: INTERNAL MEDICINE

## 2022-05-19 ENCOUNTER — OFFICE VISIT (OUTPATIENT)
Dept: FAMILY MEDICINE CLINIC | Facility: CLINIC | Age: 82
End: 2022-05-19

## 2022-05-19 VITALS
OXYGEN SATURATION: 98 % | BODY MASS INDEX: 22.39 KG/M2 | HEIGHT: 61 IN | HEART RATE: 78 BPM | WEIGHT: 118.6 LBS | TEMPERATURE: 98.6 F | SYSTOLIC BLOOD PRESSURE: 138 MMHG | DIASTOLIC BLOOD PRESSURE: 78 MMHG

## 2022-05-19 DIAGNOSIS — F41.9 ANXIETY: ICD-10-CM

## 2022-05-19 DIAGNOSIS — M25.561 ACUTE PAIN OF BOTH KNEES: ICD-10-CM

## 2022-05-19 DIAGNOSIS — I10 PRIMARY HYPERTENSION: ICD-10-CM

## 2022-05-19 DIAGNOSIS — M17.10 KNEE ARTHROPATHY: ICD-10-CM

## 2022-05-19 DIAGNOSIS — Z79.01 ANTICOAGULATED ON WARFARIN: Primary | ICD-10-CM

## 2022-05-19 DIAGNOSIS — M25.562 ACUTE PAIN OF BOTH KNEES: ICD-10-CM

## 2022-05-19 LAB — INR PPP: 2.1 (ref 2–3)

## 2022-05-19 PROCEDURE — 85610 PROTHROMBIN TIME: CPT | Performed by: INTERNAL MEDICINE

## 2022-05-19 PROCEDURE — 99213 OFFICE O/P EST LOW 20 MIN: CPT | Performed by: INTERNAL MEDICINE

## 2022-05-19 PROCEDURE — 36416 COLLJ CAPILLARY BLOOD SPEC: CPT | Performed by: INTERNAL MEDICINE

## 2022-05-19 RX ORDER — ALPRAZOLAM 0.5 MG/1
0.5 TABLET ORAL 3 TIMES DAILY PRN
Qty: 90 TABLET | Refills: 1 | Status: SHIPPED | OUTPATIENT
Start: 2022-05-19 | End: 2022-08-07 | Stop reason: SDUPTHER

## 2022-05-19 NOTE — PROGRESS NOTES
"Sweetei Rojo is a 81 y.o. female.     Chief Complaint   Patient presents with   • Edema   • Anticoagulation   • Knee Pain       History of Present Illness   States having \"puffiness in the ankles\" and bilateral knee swelling and discomfort for the last 2 weeks.  No initiating events and no changes in medications.  Seems to worsen as day progresses.  Hs not been taking anything other than tylenol.    Here for follow up INR evaluation.  Currently anticoagulated with warfarin for atrial fibrillation.  The patient is taking warfarin 3 mg T, Th, S, S and 1.5 mg M,W,F.  The current INR goal is 2-3.  The current INR is 2.1.  No significant interval events such as easy bleeding, bruising, fever, weakness or numbness.  Last week got up to go to the bathroom and on way backward and landed on buttock and then the back of the head.  No LOC or bleeding.  Doing well since.    History of cholesterol.  Currently, has been feeling well without any myalgias, muscle aches, weakness, numbness, chest pain, short of breath or other issues.  Currently, is adherent with medication regimen of pravastatin 20 mg a day and denies medication side effects. Labs done 2/24/22.     History of hypertension.  Currently, has been feeling well and asymptomatic without any headaches, vision changes, cough, chest pain, shortness of breath, swelling, focal neurologic deficit, memory loss or syncope.  Has been taking the medications regularly and adherent with the regimen of diltiazem CD 180mg daily, lasix 20 mg BID and metoprolol XL 50 mg 3 tabs daily.  Denies medication side effects and no significant interval events. Labs done 2/24/22.     History of anxiety.  Doing well currently and sleeping well with no recent panic attacks.  Using the alprazolam as needed but is usually taking only one a day and no noted side effects.  Does not need refill at this time.    The following portions of the patient's history were reviewed and updated as " appropriate: allergies, current medications, past family history, past medical history, past social history, past surgical history and problem list.    Depression Screen:  PHQ-2/PHQ-9 Depression Screening 2/24/2022   Retired PHQ-9 Total Score 0   Retired Total Score 0       Past Medical History:   Diagnosis Date   • A-fib ([facility])    • Abnormality of lung    • Acquired insufficiency of aortic valve    • Antral gastritis    • Anxiety    • Asymptomatic postmenopausal status    • Atrial fibrillation ([facility])    • Body mass index (BMI) 24.0-24.9, adult    • Chest pain, atypical    • COPD (chronic obstructive pulmonary disease) ([facility])    • Diverticulosis of colon    • ASHLEE (generalized anxiety disorder)    • GERD (gastroesophageal reflux disease)    • Heart disease    • Hiatal hernia    • High risk medication use    • Hyperactive bowel sounds    • Hyperlipidemia    • Hypertension    • Insomnia    • Intermittent claudication ([facility])    • Internal hemorrhoids    • Lower extremity edema    • Mitral insufficiency    • KOKO (obstructive sleep apnea)    • Osteopenia    • Pacemaker    • Pain of left patella    • Pedal edema    • Pulmonary hypertension ([facility])    • SSS (sick sinus syndrome) ([facility])    • Tricuspid regurgitation    • Urine frequency    • Uterovaginal prolapse    • Wheezing        Past Surgical History:   Procedure Laterality Date   • COLONOSCOPY  01/19/2015    diverticuli and hemorrhoids   • COLONOSCOPY  02/03/2009    diverticuli   • PACEMAKER IMPLANTATION     • PACEMAKER IMPLANTATION      SERIAL # [id number], model #[id number]   • UPPER GASTROINTESTINAL ENDOSCOPY  02/03/2009    small hiatal hernia   • UPPER GASTROINTESTINAL ENDOSCOPY  01/19/2015    sliding small hiatal hernia       Family History   Problem Relation Age of Onset   • Cancer Mother         oral-mouth   • Coronary artery disease Mother    • Cancer Father         oral-mouth   • Cancer Brother         cholangiocarcinoma/ oral-mouth   • Leukemia Brother        Social  History     Socioeconomic History   • Marital status:    Tobacco Use   • Smoking status: Former Smoker   • Smokeless tobacco: Never Used   Substance and Sexual Activity   • Alcohol use: No   • Drug use: No   • Sexual activity: Defer       Current Outpatient Medications   Medication Sig Dispense Refill   • ALPRAZolam (XANAX) 0.5 MG tablet Take 1 tablet by mouth 3 (Three) Times a Day As Needed for Anxiety. 90 tablet 1   • azelastine (ASTEPRO) 0.15 % solution nasal spray 2 sprays into the nostril(s) as directed by provider Daily. 1 each 3   • conjugated estrogens (Premarin) 0.625 MG/GM vaginal cream      • digoxin (LANOXIN) 125 MCG tablet TAKE ONE TABLET BY MOUTH DAILY 90 tablet 1   • dilTIAZem CD (CARDIZEM CD) 180 MG 24 hr capsule Take 1 capsule by mouth Daily. 90 capsule 1   • erythromycin (ROMYCIN) 5 MG/GM ophthalmic ointment      • esomeprazole (nexIUM) 40 MG capsule TAKE ONE CAPSULE BY MOUTH DAILY 90 capsule 1   • estradiol (ESTRACE) 0.1 MG/GM vaginal cream Insert 1 g into the vagina 2 (Two) Times a Week.     • furosemide (LASIX) 20 MG tablet TAKE TWO TABLETS BY MOUTH EVERY MORNING AND TAKE ONE TABLET BY MOUTH EVERY EVENING 270 tablet 1   • lidocaine (XYLOCAINE) 5 % ointment APPLY TOPICALLY TO THE APPROPRIATE AREA AS DIRECTED THREE TIMES DAILY 35.44 each 1   • metoprolol succinate XL (TOPROL-XL) 50 MG 24 hr tablet Take 1 tablet by mouth Daily. 90 tablet 1   • olopatadine (PATANOL) 0.1 % ophthalmic solution Administer 1 drop to both eyes 2 (Two) Times a Day. 5 mL 1   • potassium chloride (KLOR-CON) 20 MEQ CR tablet Take 1 tablet by mouth Daily. 90 tablet 3   • pravastatin (PRAVACHOL) 20 MG tablet Take 1 tablet by mouth Daily. 90 tablet 3   • PROBIOTIC PRODUCT PO Take  by mouth.     • tobramycin-dexamethasone (TOBRADEX) 0.3-0.1 % ophthalmic suspension      • warfarin (COUMADIN) 3 MG tablet TAKE ONE TABLET BY MOUTH DAILY 90 tablet 0   • Diclofenac Sodium (VOLTAREN) 1 % gel gel Apply 4 g topically to the  "appropriate area as directed 4 (Four) Times a Day As Needed (knee pain). 350 g 2     No current facility-administered medications for this visit.       Review of Systems   Constitutional: Negative for activity change, appetite change, fatigue, fever, unexpected weight gain and unexpected weight loss.   HENT: Negative for nosebleeds, rhinorrhea, trouble swallowing and voice change.    Eyes: Negative for visual disturbance.   Respiratory: Negative for cough, chest tightness, shortness of breath and wheezing.    Cardiovascular: Positive for leg swelling. Negative for chest pain and palpitations.   Gastrointestinal: Negative for abdominal pain, blood in stool, constipation, diarrhea, nausea, vomiting, GERD and indigestion.   Genitourinary: Negative for dysuria, frequency and hematuria.   Musculoskeletal: Positive for arthralgias. Negative for back pain and myalgias.   Skin: Negative for rash and wound.   Neurological: Negative for dizziness, tremors, weakness, light-headedness, numbness, headache and memory problem.   Hematological: Negative for adenopathy. Does not bruise/bleed easily.   Psychiatric/Behavioral: Negative for sleep disturbance and depressed mood. The patient is not nervous/anxious.        Objective   /78 (BP Location: Left arm, Patient Position: Sitting, Cuff Size: Adult)   Pulse 78   Temp 98.6 °F (37 °C) (Temporal)   Ht 154.9 cm (60.98\")   Wt 53.8 kg (118 lb 9.6 oz)   SpO2 98%   BMI 22.42 kg/m²     Physical Exam  Vitals and nursing note reviewed.   Constitutional:       General: She is not in acute distress.     Appearance: She is well-developed. She is not diaphoretic.   HENT:      Head: Normocephalic and atraumatic.      Right Ear: External ear normal.      Left Ear: External ear normal.      Nose: Nose normal.   Eyes:      Conjunctiva/sclera: Conjunctivae normal.      Pupils: Pupils are equal, round, and reactive to light.   Neck:      Thyroid: No thyromegaly.      Trachea: No tracheal " deviation.   Cardiovascular:      Rate and Rhythm: Normal rate and regular rhythm.      Heart sounds: Normal heart sounds. No murmur heard.    No friction rub. No gallop.   Pulmonary:      Effort: Pulmonary effort is normal. No respiratory distress.      Breath sounds: Normal breath sounds.   Abdominal:      General: Bowel sounds are normal.      Palpations: Abdomen is soft. There is no mass.      Tenderness: There is no abdominal tenderness. There is no guarding.   Musculoskeletal:         General: Normal range of motion.      Cervical back: Normal range of motion and neck supple.      Comments: Bilateral knee crepitus with FROM.  Mild swelling medially in both knees with no cords and negative Jesus's sign.   Lymphadenopathy:      Cervical: No cervical adenopathy.   Skin:     General: Skin is warm and dry.      Capillary Refill: Capillary refill takes less than 2 seconds.      Findings: No rash.   Neurological:      Mental Status: She is alert and oriented to person, place, and time.      Motor: No abnormal muscle tone.      Deep Tendon Reflexes: Reflexes normal.   Psychiatric:         Behavior: Behavior normal.         Thought Content: Thought content normal.         Judgment: Judgment normal.         Recent Results (from the past 2016 hour(s))   POC INR    Collection Time: 03/24/22 10:33 AM    Specimen: Blood   Result Value Ref Range    INR 2.60 2 - 3   POC INR    Collection Time: 04/28/22 10:26 AM    Specimen: Blood   Result Value Ref Range    INR 2.10 2 - 3   POC INR    Collection Time: 05/19/22  2:39 PM    Specimen: Blood   Result Value Ref Range    INR 2.10 (A) 2 - 3     Assessment & Plan   Diagnoses and all orders for this visit:    1. Anticoagulated on warfarin (Primary)  -     POC INR    2. Acute pain of both knees  -     Diclofenac Sodium (VOLTAREN) 1 % gel gel; Apply 4 g topically to the appropriate area as directed 4 (Four) Times a Day As Needed (knee pain).  Dispense: 350 g; Refill: 2    3. Knee  arthropathy  -     Diclofenac Sodium (VOLTAREN) 1 % gel gel; Apply 4 g topically to the appropriate area as directed 4 (Four) Times a Day As Needed (knee pain).  Dispense: 350 g; Refill: 2    4. Anxiety  -     ALPRAZolam (XANAX) 0.5 MG tablet; Take 1 tablet by mouth 3 (Three) Times a Day As Needed for Anxiety.  Dispense: 90 tablet; Refill: 1    5. Primary hypertension    INR is appropriate continue current warfarin recheck in 1 month.  Knee pain bilateral with some swelling in the medial aspect which appears to be due to more to an arthropathy.  Recommend use of the Voltaren gel and to avoid any NSAID oral use.  Anxiety stable continue the alprazolam as needed.  EMILIA run and reviewed.  Risks of the medication include but are not limited to fatigue, somnolence, increased risk of falls, allergic reaction, dependence, and addiction.  Hypertension is well controlled.  No changes are needed at this time.         · COVID-19 Precautions - Patient was compliant in wearing a mask. When I saw the patient, I used appropriate personal protective equipment (PPE) including mask and eye shield (standard procedure).  Additionally, I used gown and gloves if indicated.  Hand hygiene was completed before and after seeing the patient.  · Dictated utilizing Dragon Dictation

## 2022-06-13 DIAGNOSIS — I48.20 CHRONIC ATRIAL FIBRILLATION: ICD-10-CM

## 2022-06-13 DIAGNOSIS — Z79.01 ANTICOAGULATED ON WARFARIN: ICD-10-CM

## 2022-06-13 RX ORDER — WARFARIN SODIUM 3 MG/1
3 TABLET ORAL DAILY
Qty: 90 TABLET | Refills: 0 | Status: SHIPPED | OUTPATIENT
Start: 2022-06-13 | End: 2022-12-05

## 2022-06-13 NOTE — TELEPHONE ENCOUNTER
Rx Refill Note  Requested Prescriptions     Pending Prescriptions Disp Refills   • warfarin (COUMADIN) 3 MG tablet 90 tablet 0     Sig: Take 1 tablet by mouth Daily.      Last office visit with prescribing clinician: 5/19/2022      Next office visit with prescribing clinician: Visit date not found            Quin Denson MA  06/13/22, 10:14 EDT

## 2022-06-20 DIAGNOSIS — E87.6 HYPOKALEMIA: ICD-10-CM

## 2022-06-20 RX ORDER — POTASSIUM CHLORIDE 20 MEQ/1
20 TABLET, EXTENDED RELEASE ORAL DAILY
Qty: 90 TABLET | Refills: 3 | Status: SHIPPED | OUTPATIENT
Start: 2022-06-20 | End: 2022-11-17

## 2022-06-20 NOTE — TELEPHONE ENCOUNTER
Rx Refill Note  Requested Prescriptions     Pending Prescriptions Disp Refills   • potassium chloride (KLOR-CON) 20 MEQ CR tablet 90 tablet 3     Sig: Take 1 tablet by mouth Daily.      Last office visit with prescribing clinician: 5/19/2022      Next office visit with prescribing clinician: Visit date not found            Quin Denson MA  06/20/22, 14:40 EDT

## 2022-06-22 ENCOUNTER — CLINICAL SUPPORT (OUTPATIENT)
Dept: FAMILY MEDICINE CLINIC | Facility: CLINIC | Age: 82
End: 2022-06-22

## 2022-06-22 DIAGNOSIS — Z79.01 ANTICOAGULATED ON WARFARIN: Primary | ICD-10-CM

## 2022-06-22 LAB — INR PPP: 3.5 (ref 2–3)

## 2022-06-22 PROCEDURE — 85610 PROTHROMBIN TIME: CPT | Performed by: INTERNAL MEDICINE

## 2022-06-22 PROCEDURE — 36416 COLLJ CAPILLARY BLOOD SPEC: CPT | Performed by: INTERNAL MEDICINE

## 2022-06-24 ENCOUNTER — TELEPHONE (OUTPATIENT)
Dept: FAMILY MEDICINE CLINIC | Facility: CLINIC | Age: 82
End: 2022-06-24

## 2022-06-24 NOTE — TELEPHONE ENCOUNTER
Hub staff attempted to follow warm transfer process and was unsuccessful     Caller: MARISSA WANG    Relationship to patient: Emergency Contact    Best call back number: [phone]     Patient is needing: MARISSA CALLED IN TO RESCHEDULE INR . PLEASE ADVISE .

## 2022-06-29 ENCOUNTER — CLINICAL SUPPORT (OUTPATIENT)
Dept: FAMILY MEDICINE CLINIC | Facility: CLINIC | Age: 82
End: 2022-06-29

## 2022-06-29 DIAGNOSIS — Z79.01 ANTICOAGULATED ON WARFARIN: ICD-10-CM

## 2022-06-29 LAB — INR PPP: 1.9 (ref 2–3)

## 2022-06-29 PROCEDURE — 85610 PROTHROMBIN TIME: CPT | Performed by: INTERNAL MEDICINE

## 2022-06-29 PROCEDURE — 36416 COLLJ CAPILLARY BLOOD SPEC: CPT | Performed by: INTERNAL MEDICINE

## 2022-07-07 ENCOUNTER — CLINICAL SUPPORT (OUTPATIENT)
Dept: FAMILY MEDICINE CLINIC | Facility: CLINIC | Age: 82
End: 2022-07-07

## 2022-07-07 LAB — INR PPP: 2.6 (ref 2–3)

## 2022-07-07 PROCEDURE — 85610 PROTHROMBIN TIME: CPT | Performed by: INTERNAL MEDICINE

## 2022-07-07 PROCEDURE — 36416 COLLJ CAPILLARY BLOOD SPEC: CPT | Performed by: INTERNAL MEDICINE

## 2022-07-08 DIAGNOSIS — I10 ESSENTIAL HYPERTENSION: ICD-10-CM

## 2022-07-08 DIAGNOSIS — I48.91 ATRIAL FIBRILLATION, UNSPECIFIED TYPE: ICD-10-CM

## 2022-07-08 RX ORDER — DILTIAZEM HYDROCHLORIDE 180 MG/1
CAPSULE, COATED, EXTENDED RELEASE ORAL
Qty: 90 CAPSULE | Refills: 1 | Status: SHIPPED | OUTPATIENT
Start: 2022-07-08 | End: 2022-10-19 | Stop reason: SDUPTHER

## 2022-07-27 ENCOUNTER — CLINICAL SUPPORT (OUTPATIENT)
Dept: FAMILY MEDICINE CLINIC | Facility: CLINIC | Age: 82
End: 2022-07-27

## 2022-07-27 DIAGNOSIS — Z79.01 ANTICOAGULATED ON WARFARIN: Primary | ICD-10-CM

## 2022-07-27 LAB — INR PPP: 2.6 (ref 2–3)

## 2022-07-27 PROCEDURE — 36416 COLLJ CAPILLARY BLOOD SPEC: CPT | Performed by: INTERNAL MEDICINE

## 2022-07-27 PROCEDURE — 85610 PROTHROMBIN TIME: CPT | Performed by: INTERNAL MEDICINE

## 2022-08-02 DIAGNOSIS — I10 ESSENTIAL HYPERTENSION: ICD-10-CM

## 2022-08-02 RX ORDER — FUROSEMIDE 20 MG/1
TABLET ORAL
Qty: 270 TABLET | Refills: 1 | Status: SHIPPED | OUTPATIENT
Start: 2022-08-02 | End: 2023-01-30

## 2022-08-02 NOTE — TELEPHONE ENCOUNTER
Rx Refill Note  Requested Prescriptions     Pending Prescriptions Disp Refills   • furosemide (LASIX) 20 MG tablet [Pharmacy Med Name: FUROSEMIDE 20 MG TABLET] 270 tablet 1     Sig: TAKE TWO TABLETS BY MOUTH EVERY MORNING AND TAKE ONE TABLET BY MOUTH EVERY EVENING      Last office visit with prescribing clinician: 5/19/2022      Next office visit with prescribing clinician: 8/18/2022

## 2022-08-07 DIAGNOSIS — F41.9 ANXIETY: ICD-10-CM

## 2022-08-08 RX ORDER — ALPRAZOLAM 0.5 MG/1
0.5 TABLET ORAL 3 TIMES DAILY PRN
Qty: 90 TABLET | Refills: 1 | Status: SHIPPED | OUTPATIENT
Start: 2022-08-08 | End: 2022-10-19 | Stop reason: SDUPTHER

## 2022-08-08 NOTE — TELEPHONE ENCOUNTER
Rx Refill Note  Requested Prescriptions     Pending Prescriptions Disp Refills   • ALPRAZolam (XANAX) 0.5 MG tablet 90 tablet 1     Sig: Take 1 tablet by mouth 3 (Three) Times a Day As Needed for Anxiety.      Last office visit with prescribing clinician: 5/19/2022      Next office visit with prescribing clinician: 8/18/2022  LAST REFILL 05/19/2022

## 2022-08-15 DIAGNOSIS — K21.9 GASTROESOPHAGEAL REFLUX DISEASE WITHOUT ESOPHAGITIS: ICD-10-CM

## 2022-08-15 RX ORDER — ESOMEPRAZOLE MAGNESIUM 40 MG/1
CAPSULE, DELAYED RELEASE ORAL
Qty: 90 CAPSULE | Refills: 1 | Status: SHIPPED | OUTPATIENT
Start: 2022-08-15 | End: 2022-08-18 | Stop reason: SDUPTHER

## 2022-08-15 NOTE — TELEPHONE ENCOUNTER
Rx Refill Note  Requested Prescriptions     Pending Prescriptions Disp Refills   • esomeprazole (nexIUM) 40 MG capsule [Pharmacy Med Name: ESOMEPRAZOLE MAG DR 40 MG CAP] 90 capsule 1     Sig: TAKE ONE CAPSULE BY MOUTH DAILY      Last office visit with prescribing clinician: 5/19/2022      Next office visit with prescribing clinician: 8/18/2022            Quin Denson MA  08/15/22, 09:25 EDT

## 2022-08-18 ENCOUNTER — OFFICE VISIT (OUTPATIENT)
Dept: FAMILY MEDICINE CLINIC | Facility: CLINIC | Age: 82
End: 2022-08-18

## 2022-08-18 VITALS
DIASTOLIC BLOOD PRESSURE: 82 MMHG | SYSTOLIC BLOOD PRESSURE: 138 MMHG | TEMPERATURE: 98.9 F | BODY MASS INDEX: 22.31 KG/M2 | HEART RATE: 88 BPM | WEIGHT: 118 LBS | OXYGEN SATURATION: 99 %

## 2022-08-18 DIAGNOSIS — I10 PRIMARY HYPERTENSION: Primary | ICD-10-CM

## 2022-08-18 DIAGNOSIS — M25.561 ACUTE PAIN OF BOTH KNEES: ICD-10-CM

## 2022-08-18 DIAGNOSIS — M17.10 KNEE ARTHROPATHY: ICD-10-CM

## 2022-08-18 DIAGNOSIS — M25.562 ACUTE PAIN OF BOTH KNEES: ICD-10-CM

## 2022-08-18 DIAGNOSIS — K21.9 GASTROESOPHAGEAL REFLUX DISEASE WITHOUT ESOPHAGITIS: ICD-10-CM

## 2022-08-18 LAB — INR PPP: 2 (ref 2–3)

## 2022-08-18 PROCEDURE — 85610 PROTHROMBIN TIME: CPT | Performed by: INTERNAL MEDICINE

## 2022-08-18 PROCEDURE — 99213 OFFICE O/P EST LOW 20 MIN: CPT | Performed by: INTERNAL MEDICINE

## 2022-08-18 PROCEDURE — 36416 COLLJ CAPILLARY BLOOD SPEC: CPT | Performed by: INTERNAL MEDICINE

## 2022-08-18 RX ORDER — METOPROLOL SUCCINATE 50 MG/1
50 TABLET, EXTENDED RELEASE ORAL DAILY
Qty: 90 TABLET | Refills: 1 | Status: SHIPPED | OUTPATIENT
Start: 2022-08-18 | End: 2023-01-30

## 2022-08-18 RX ORDER — ESOMEPRAZOLE MAGNESIUM 40 MG/1
40 CAPSULE, DELAYED RELEASE ORAL DAILY
Qty: 90 CAPSULE | Refills: 1 | Status: SHIPPED | OUTPATIENT
Start: 2022-08-18

## 2022-08-18 NOTE — PROGRESS NOTES
Subjective   Stacey Rojo is a 82 y.o. female.     Chief Complaint   Patient presents with   • Anticoagulation       History of Present Illness   Here for follow up INR evaluation.  Currently anticoagulated with warfarin for atrial fibrillation.  The patient is taking warfarin 3 mg T, Th, S, S and 1.5 mg M,W,F.  The current INR goal is 2-3.  The current INR is 2.1.  No significant interval events such as easy bleeding, bruising, fever, weakness or numbness.  Last week got up to go to the bathroom and on way backward and landed on buttock and then the back of the head.  No LOC or bleeding.  Doing well since.     Patient with right greater than left knee pain and has not been using anything other than tylenol with little relief.  In past used diclofenac and felt better but stopped concerned with interaction of the warfarin and has not used for a long time.    History of cholesterol.  Currently, has been feeling well without any myalgias, muscle aches, weakness, numbness, chest pain, short of breath or other issues.  Currently, is adherent with medication regimen of pravastatin 20 mg a day and denies medication side effects. Labs done 2/24/22.     History of hypertension.  Currently, has been feeling well and asymptomatic without any headaches, vision changes, cough, chest pain, shortness of breath, swelling, focal neurologic deficit, memory loss or syncope.  Has been taking the medications regularly and adherent with the regimen of diltiazem CD 180mg daily, lasix 20 mg BID and metoprolol XL 50 mg 3 tabs daily.  Denies medication side effects and no significant interval events. Labs done 2/24/22.     History of anxiety.  Doing well currently and sleeping well with no recent panic attacks.  Using the alprazolam as needed but is usually taking only one a day and no noted side effects.  Does not need refill at this time.    The following portions of the patient's history were reviewed and updated as appropriate:  allergies, current medications, past family history, past medical history, past social history, past surgical history and problem list.    Depression Screen:  PHQ-2/PHQ-9 Depression Screening 2/24/2022   Retired PHQ-9 Total Score 0   Retired Total Score 0       Past Medical History:   Diagnosis Date   • A-fib ([facility])    • Abnormality of lung    • Acquired insufficiency of aortic valve    • Antral gastritis    • Anxiety    • Asymptomatic postmenopausal status    • Atrial fibrillation ([facility])    • Body mass index (BMI) 24.0-24.9, adult    • Chest pain, atypical    • COPD (chronic obstructive pulmonary disease) ([facility])    • Diverticulosis of colon    • ASHLEE (generalized anxiety disorder)    • GERD (gastroesophageal reflux disease)    • Heart disease    • Hiatal hernia    • High risk medication use    • Hyperactive bowel sounds    • Hyperlipidemia    • Hypertension    • Insomnia    • Intermittent claudication ([facility])    • Internal hemorrhoids    • Lower extremity edema    • Mitral insufficiency    • KOKO (obstructive sleep apnea)    • Osteopenia    • Pacemaker    • Pain of left patella    • Pedal edema    • Pulmonary hypertension ([facility])    • SSS (sick sinus syndrome) ([facility])    • Tricuspid regurgitation    • Urine frequency    • Uterovaginal prolapse    • Wheezing        Past Surgical History:   Procedure Laterality Date   • COLONOSCOPY  01/19/2015    diverticuli and hemorrhoids   • COLONOSCOPY  02/03/2009    diverticuli   • PACEMAKER IMPLANTATION     • PACEMAKER IMPLANTATION      SERIAL # [id number], model #[id number]   • UPPER GASTROINTESTINAL ENDOSCOPY  02/03/2009    small hiatal hernia   • UPPER GASTROINTESTINAL ENDOSCOPY  01/19/2015    sliding small hiatal hernia       Family History   Problem Relation Age of Onset   • Cancer Mother         oral-mouth   • Coronary artery disease Mother    • Cancer Father         oral-mouth   • Cancer Brother         cholangiocarcinoma/ oral-mouth   • Leukemia Brother        Social History      Socioeconomic History   • Marital status:    Tobacco Use   • Smoking status: Former Smoker   • Smokeless tobacco: Never Used   Substance and Sexual Activity   • Alcohol use: No   • Drug use: No   • Sexual activity: Defer       Current Outpatient Medications   Medication Sig Dispense Refill   • Diclofenac Sodium (VOLTAREN) 1 % gel gel Apply 4 g topically to the appropriate area as directed 4 (Four) Times a Day As Needed (knee pain). 350 g 2   • esomeprazole (nexIUM) 40 MG capsule Take 1 capsule by mouth Daily. 90 capsule 1   • metoprolol succinate XL (TOPROL-XL) 50 MG 24 hr tablet Take 1 tablet by mouth Daily. 90 tablet 1   • ALPRAZolam (XANAX) 0.5 MG tablet Take 1 tablet by mouth 3 (Three) Times a Day As Needed for Anxiety. 90 tablet 1   • azelastine (ASTEPRO) 0.15 % solution nasal spray 2 sprays into the nostril(s) as directed by provider Daily. 1 each 3   • conjugated estrogens (Premarin) 0.625 MG/GM vaginal cream      • digoxin (LANOXIN) 125 MCG tablet TAKE ONE TABLET BY MOUTH DAILY 90 tablet 1   • dilTIAZem CD (CARDIZEM CD) 180 MG 24 hr capsule TAKE ONE CAPSULE BY MOUTH DAILY 90 capsule 1   • erythromycin (ROMYCIN) 5 MG/GM ophthalmic ointment      • estradiol (ESTRACE) 0.1 MG/GM vaginal cream Insert 1 g into the vagina 2 (Two) Times a Week.     • furosemide (LASIX) 20 MG tablet TAKE TWO TABLETS BY MOUTH EVERY MORNING AND TAKE ONE TABLET BY MOUTH EVERY EVENING 270 tablet 1   • lidocaine (XYLOCAINE) 5 % ointment APPLY TOPICALLY TO THE APPROPRIATE AREA AS DIRECTED THREE TIMES DAILY 35.44 each 1   • olopatadine (PATANOL) 0.1 % ophthalmic solution Administer 1 drop to both eyes 2 (Two) Times a Day. 5 mL 1   • potassium chloride (KLOR-CON) 20 MEQ CR tablet Take 1 tablet by mouth Daily. 90 tablet 3   • pravastatin (PRAVACHOL) 20 MG tablet Take 1 tablet by mouth Daily. 90 tablet 3   • PROBIOTIC PRODUCT PO Take  by mouth.     • tobramycin-dexamethasone (TOBRADEX) 0.3-0.1 % ophthalmic suspension      •  warfarin (COUMADIN) 3 MG tablet Take 1 tablet by mouth Daily. 90 tablet 0     No current facility-administered medications for this visit.       Review of Systems   Constitutional: Negative for activity change, appetite change, fatigue, fever, unexpected weight gain and unexpected weight loss.   HENT: Negative for nosebleeds, rhinorrhea, trouble swallowing and voice change.    Eyes: Negative for visual disturbance.   Respiratory: Negative for cough, chest tightness, shortness of breath and wheezing.    Cardiovascular: Negative for chest pain, palpitations and leg swelling.   Gastrointestinal: Negative for abdominal pain, blood in stool, constipation, diarrhea, nausea, vomiting, GERD and indigestion.   Genitourinary: Negative for dysuria, frequency and hematuria.   Musculoskeletal: Positive for arthralgias. Negative for back pain and myalgias.   Skin: Negative for rash and wound.   Neurological: Negative for dizziness, tremors, weakness, light-headedness, numbness, headache and memory problem.   Hematological: Negative for adenopathy. Does not bruise/bleed easily.   Psychiatric/Behavioral: Negative for sleep disturbance and depressed mood. The patient is not nervous/anxious.        Objective   /82 (BP Location: Left arm, Patient Position: Sitting, Cuff Size: Adult)   Pulse 88   Temp 98.9 °F (37.2 °C) (Temporal)   Wt 53.5 kg (118 lb)   SpO2 99%   BMI 22.31 kg/m²     Physical Exam  Vitals and nursing note reviewed.   Constitutional:       General: She is not in acute distress.     Appearance: She is well-developed. She is not diaphoretic.   HENT:      Head: Normocephalic and atraumatic.      Right Ear: External ear normal.      Left Ear: External ear normal.      Nose: Nose normal.   Eyes:      Conjunctiva/sclera: Conjunctivae normal.      Pupils: Pupils are equal, round, and reactive to light.   Neck:      Thyroid: No thyromegaly.      Trachea: No tracheal deviation.   Cardiovascular:      Rate and Rhythm:  Normal rate and regular rhythm.      Heart sounds: Normal heart sounds. No murmur heard.    No friction rub. No gallop.   Pulmonary:      Effort: Pulmonary effort is normal. No respiratory distress.      Breath sounds: Normal breath sounds.   Abdominal:      General: Bowel sounds are normal.      Palpations: Abdomen is soft. There is no mass.      Tenderness: There is no abdominal tenderness. There is no guarding.   Musculoskeletal:         General: Normal range of motion.      Cervical back: Normal range of motion and neck supple.      Comments: Right greater than left knee crepitus laterally and significant genu valgus there is also affecting her gait.   Lymphadenopathy:      Cervical: No cervical adenopathy.   Skin:     General: Skin is warm and dry.      Capillary Refill: Capillary refill takes less than 2 seconds.      Findings: No rash.   Neurological:      Mental Status: She is alert and oriented to person, place, and time.      Motor: No abnormal muscle tone.      Deep Tendon Reflexes: Reflexes normal.   Psychiatric:         Behavior: Behavior normal.         Thought Content: Thought content normal.         Judgment: Judgment normal.         Recent Results (from the past 2016 hour(s))   POC INR    Collection Time: 06/22/22 10:41 AM    Specimen: Blood   Result Value Ref Range    INR 3.50 (A) 2 - 3   POC INR    Collection Time: 06/29/22 10:49 AM    Specimen: Blood   Result Value Ref Range    INR 1.9 (A) 2 - 3   POC INR    Collection Time: 07/07/22 10:56 AM    Specimen: Blood   Result Value Ref Range    INR 2.60 2 - 3   POC INR    Collection Time: 07/27/22 10:21 AM    Specimen: Blood   Result Value Ref Range    INR 2.60 2 - 3   POC INR    Collection Time: 08/18/22  4:39 PM    Specimen: Blood   Result Value Ref Range    INR 2.00 (A) 2 - 3     Assessment & Plan   Diagnoses and all orders for this visit:    1. Primary hypertension (Primary)  -     metoprolol succinate XL (TOPROL-XL) 50 MG 24 hr tablet; Take 1  tablet by mouth Daily.  Dispense: 90 tablet; Refill: 1    2. Gastroesophageal reflux disease without esophagitis  -     esomeprazole (nexIUM) 40 MG capsule; Take 1 capsule by mouth Daily.  Dispense: 90 capsule; Refill: 1    3. Acute pain of both knees  -     Diclofenac Sodium (VOLTAREN) 1 % gel gel; Apply 4 g topically to the appropriate area as directed 4 (Four) Times a Day As Needed (knee pain).  Dispense: 350 g; Refill: 2    4. Knee arthropathy  -     Diclofenac Sodium (VOLTAREN) 1 % gel gel; Apply 4 g topically to the appropriate area as directed 4 (Four) Times a Day As Needed (knee pain).  Dispense: 350 g; Refill: 2    Other orders  -     POC INR    Hypertension is currently controlled.  No changes at this time.  GERD is stable.  Discussed and reviewed the acute pain in the knees which actually is more chronic likely due to arthropathy.  I read did recommend that she restart the diclofenac topical gel as this should not have any interaction with her warfarin or other medications.  INR is controlled at 2.0 and we will continue the current warfarin dosing unchanged.           · COVID-19 Precautions - Patient was compliant in wearing a mask. When I saw the patient, I used appropriate personal protective equipment (PPE) including mask and eye shield (standard procedure).  Additionally, I used gown and gloves if indicated.  Hand hygiene was completed before and after seeing the patient.  · Dictated utilizing Dragon Dictation

## 2022-09-22 ENCOUNTER — ANTICOAGULATION VISIT (OUTPATIENT)
Dept: FAMILY MEDICINE CLINIC | Facility: CLINIC | Age: 82
End: 2022-09-22

## 2022-09-22 DIAGNOSIS — Z79.01 LONG TERM (CURRENT) USE OF ANTICOAGULANTS: Primary | ICD-10-CM

## 2022-09-22 LAB — INR PPP: 2.9 (ref 0.9–1.1)

## 2022-09-22 PROCEDURE — 36416 COLLJ CAPILLARY BLOOD SPEC: CPT | Performed by: INTERNAL MEDICINE

## 2022-09-22 PROCEDURE — 85610 PROTHROMBIN TIME: CPT | Performed by: INTERNAL MEDICINE

## 2022-10-06 RX ORDER — DIGOXIN 125 MCG
TABLET ORAL
Qty: 90 TABLET | Refills: 1 | Status: SHIPPED | OUTPATIENT
Start: 2022-10-06

## 2022-10-19 DIAGNOSIS — F41.9 ANXIETY: ICD-10-CM

## 2022-10-19 DIAGNOSIS — I10 ESSENTIAL HYPERTENSION: ICD-10-CM

## 2022-10-19 DIAGNOSIS — I48.91 ATRIAL FIBRILLATION, UNSPECIFIED TYPE: ICD-10-CM

## 2022-10-19 RX ORDER — ALPRAZOLAM 0.5 MG/1
0.5 TABLET ORAL 3 TIMES DAILY PRN
Qty: 90 TABLET | Refills: 1 | Status: SHIPPED | OUTPATIENT
Start: 2022-10-19

## 2022-10-19 RX ORDER — DILTIAZEM HYDROCHLORIDE 180 MG/1
180 CAPSULE, COATED, EXTENDED RELEASE ORAL DAILY
Qty: 90 CAPSULE | Refills: 1 | Status: SHIPPED | OUTPATIENT
Start: 2022-10-19

## 2022-10-19 NOTE — TELEPHONE ENCOUNTER
Rx Refill Note  Requested Prescriptions     Pending Prescriptions Disp Refills   • dilTIAZem CD (CARDIZEM CD) 180 MG 24 hr capsule 90 capsule 1     Sig: Take 1 capsule by mouth Daily.   • ALPRAZolam (XANAX) 0.5 MG tablet 90 tablet 1     Sig: Take 1 tablet by mouth 3 (Three) Times a Day As Needed for Anxiety.      Last office visit with prescribing clinician: 8/18/2022      Next office visit with prescribing clinician: 11/17/2022

## 2022-10-20 ENCOUNTER — ANTICOAGULATION VISIT (OUTPATIENT)
Dept: FAMILY MEDICINE CLINIC | Facility: CLINIC | Age: 82
End: 2022-10-20

## 2022-10-20 DIAGNOSIS — Z79.01 LONG TERM (CURRENT) USE OF ANTICOAGULANTS: Primary | ICD-10-CM

## 2022-10-20 LAB — INR PPP: 2.1 (ref 2–3)

## 2022-10-20 PROCEDURE — 85610 PROTHROMBIN TIME: CPT | Performed by: INTERNAL MEDICINE

## 2022-10-20 PROCEDURE — 36416 COLLJ CAPILLARY BLOOD SPEC: CPT | Performed by: INTERNAL MEDICINE

## 2022-11-17 ENCOUNTER — OFFICE VISIT (OUTPATIENT)
Dept: FAMILY MEDICINE CLINIC | Facility: CLINIC | Age: 82
End: 2022-11-17

## 2022-11-17 VITALS
HEIGHT: 60 IN | OXYGEN SATURATION: 98 % | SYSTOLIC BLOOD PRESSURE: 120 MMHG | DIASTOLIC BLOOD PRESSURE: 60 MMHG | WEIGHT: 125 LBS | TEMPERATURE: 97.1 F | BODY MASS INDEX: 24.54 KG/M2 | HEART RATE: 78 BPM

## 2022-11-17 DIAGNOSIS — E87.6 HYPOKALEMIA: ICD-10-CM

## 2022-11-17 DIAGNOSIS — I48.91 ATRIAL FIBRILLATION, UNSPECIFIED TYPE: ICD-10-CM

## 2022-11-17 DIAGNOSIS — I10 PRIMARY HYPERTENSION: ICD-10-CM

## 2022-11-17 DIAGNOSIS — Z79.01 LONG TERM (CURRENT) USE OF ANTICOAGULANTS: Primary | ICD-10-CM

## 2022-11-17 LAB — INR PPP: 4.9 (ref 2–3)

## 2022-11-17 PROCEDURE — 99214 OFFICE O/P EST MOD 30 MIN: CPT | Performed by: INTERNAL MEDICINE

## 2022-11-17 PROCEDURE — 36416 COLLJ CAPILLARY BLOOD SPEC: CPT | Performed by: INTERNAL MEDICINE

## 2022-11-17 PROCEDURE — 85610 PROTHROMBIN TIME: CPT | Performed by: INTERNAL MEDICINE

## 2022-11-17 RX ORDER — ACETAMINOPHEN 500 MG
500 TABLET ORAL EVERY 6 HOURS PRN
COMMUNITY

## 2022-11-17 RX ORDER — POTASSIUM CHLORIDE 20MEQ/15ML
20 LIQUID (ML) ORAL DAILY
Qty: 450 ML | Refills: 5 | Status: SHIPPED | OUTPATIENT
Start: 2022-11-17 | End: 2022-11-28

## 2022-11-17 NOTE — PROGRESS NOTES
Subjective   Stacey Rojo is a 82 y.o. female.     Chief Complaint   Patient presents with   • Hypertension   • Anticoagulation     INR today 4.9   • Injections     Would like a flu shot       History of Present Illness   Has difficulty swallowing the potassium tabs.  Wants to try liquid version    Here for follow up INR evaluation.  Currently anticoagulated with warfarin for atrial fibrillation.  The patient is taking warfarin 3 mg T, Th, S, S and 1.5 mg M,W,F.  The current INR goal is 2-3.  The current INR is 4.9.  No significant interval events such as easy bleeding, bruising, fever, weakness or numbness.  Last week got up to go to the bathroom and on way backward and landed on buttock and then the back of the head.  No LOC or bleeding.  Doing well since.     Patient with right greater than left knee pain and has not been using anything other than tylenol with little relief.  In past used diclofenac and felt better but stopped concerned with interaction of the warfarin and has not used for a long time.     History of cholesterol.  Currently, has been feeling well without any myalgias, muscle aches, weakness, numbness, chest pain, short of breath or other issues.  Currently, is adherent with medication regimen of pravastatin 20 mg a day and denies medication side effects. Labs done 2/24/22.     History of hypertension.  Currently, has been feeling well and asymptomatic without any headaches, vision changes, cough, chest pain, shortness of breath, swelling, focal neurologic deficit, memory loss or syncope.  Has been taking the medications regularly and adherent with the regimen of diltiazem CD 180mg daily, lasix 20 mg BID and metoprolol XL 50 mg 3 tabs daily.  Denies medication side effects and no significant interval events. Labs done 2/24/22.     History of anxiety.  Doing well currently and sleeping well with no recent panic attacks.  Using the alprazolam as needed but is usually taking only one a day and no  noted side effects.  Does not need refill at this time.       The following portions of the patient's history were reviewed and updated as appropriate: allergies, current medications, past family history, past medical history, past social history, past surgical history and problem list.    Depression Screen:  PHQ-2/PHQ-9 Depression Screening 2/24/2022   Retired PHQ-9 Total Score 0   Retired Total Score 0       Past Medical History:   Diagnosis Date   • A-fib ([facility])    • Abnormality of lung    • Acquired insufficiency of aortic valve    • Antral gastritis    • Anxiety    • Asymptomatic postmenopausal status    • Atrial fibrillation ([facility])    • Body mass index (BMI) 24.0-24.9, adult    • Chest pain, atypical    • COPD (chronic obstructive pulmonary disease) ([facility])    • Diverticulosis of colon    • ASHLEE (generalized anxiety disorder)    • GERD (gastroesophageal reflux disease)    • Heart disease    • Hiatal hernia    • High risk medication use    • Hyperactive bowel sounds    • Hyperlipidemia    • Hypertension    • Insomnia    • Intermittent claudication ([facility])    • Internal hemorrhoids    • Lower extremity edema    • Mitral insufficiency    • KOKO (obstructive sleep apnea)    • Osteopenia    • Pacemaker    • Pain of left patella    • Pedal edema    • Pulmonary hypertension ([facility])    • SSS (sick sinus syndrome) ([facility])    • Tricuspid regurgitation    • Urine frequency    • Uterovaginal prolapse    • Wheezing        Past Surgical History:   Procedure Laterality Date   • COLONOSCOPY  01/19/2015    diverticuli and hemorrhoids   • COLONOSCOPY  02/03/2009    diverticuli   • PACEMAKER IMPLANTATION     • PACEMAKER IMPLANTATION      SERIAL # [id number], model #[id number]   • UPPER GASTROINTESTINAL ENDOSCOPY  02/03/2009    small hiatal hernia   • UPPER GASTROINTESTINAL ENDOSCOPY  01/19/2015    sliding small hiatal hernia       Family History   Problem Relation Age of Onset   • Cancer Mother         oral-mouth   • Coronary artery disease  Mother    • Cancer Father         oral-mouth   • Cancer Brother         cholangiocarcinoma/ oral-mouth   • Leukemia Brother        Social History     Socioeconomic History   • Marital status:    Tobacco Use   • Smoking status: Former   • Smokeless tobacco: Never   Substance and Sexual Activity   • Alcohol use: No   • Drug use: No   • Sexual activity: Defer       Current Outpatient Medications   Medication Sig Dispense Refill   • acetaminophen (TYLENOL) 500 MG tablet Take 500 mg by mouth Every 6 (Six) Hours As Needed for Mild Pain.     • ALPRAZolam (XANAX) 0.5 MG tablet Take 1 tablet by mouth 3 (Three) Times a Day As Needed for Anxiety. 90 tablet 1   • conjugated estrogens (Premarin) 0.625 MG/GM vaginal cream      • Diclofenac Sodium (VOLTAREN) 1 % gel gel Apply 4 g topically to the appropriate area as directed 4 (Four) Times a Day As Needed (knee pain). 350 g 2   • digoxin (LANOXIN) 125 MCG tablet TAKE ONE TABLET BY MOUTH DAILY 90 tablet 1   • dilTIAZem CD (CARDIZEM CD) 180 MG 24 hr capsule Take 1 capsule by mouth Daily. 90 capsule 1   • esomeprazole (nexIUM) 40 MG capsule Take 1 capsule by mouth Daily. 90 capsule 1   • furosemide (LASIX) 20 MG tablet TAKE TWO TABLETS BY MOUTH EVERY MORNING AND TAKE ONE TABLET BY MOUTH EVERY EVENING 270 tablet 1   • metoprolol succinate XL (TOPROL-XL) 50 MG 24 hr tablet Take 1 tablet by mouth Daily. 90 tablet 1   • pravastatin (PRAVACHOL) 20 MG tablet Take 1 tablet by mouth Daily. 90 tablet 3   • PROBIOTIC PRODUCT PO Take  by mouth.     • warfarin (COUMADIN) 3 MG tablet Take 1 tablet by mouth Daily. 90 tablet 0   • potassium chloride (KAYCIEL) 20 mEq/15 mL solution Take 15 mL by mouth Daily. 450 mL 5     No current facility-administered medications for this visit.       Review of Systems   Constitutional: Positive for fatigue. Negative for activity change, appetite change, fever, unexpected weight gain and unexpected weight loss.        Cold feeling   HENT: Negative for  "nosebleeds, rhinorrhea, trouble swallowing and voice change.    Eyes: Negative for visual disturbance.   Respiratory: Negative for cough, chest tightness, shortness of breath and wheezing.    Cardiovascular: Negative for chest pain, palpitations and leg swelling.   Gastrointestinal: Negative for abdominal pain, blood in stool, constipation, diarrhea, nausea, vomiting, GERD and indigestion.   Genitourinary: Negative for dysuria, frequency and hematuria.   Musculoskeletal: Positive for arthralgias. Negative for back pain and myalgias.   Skin: Negative for rash and wound.   Neurological: Negative for dizziness, tremors, weakness, light-headedness, numbness, headache and memory problem.   Hematological: Negative for adenopathy. Does not bruise/bleed easily.   Psychiatric/Behavioral: Negative for sleep disturbance and depressed mood. The patient is not nervous/anxious.        Objective   /60   Pulse 78   Temp 97.1 °F (36.2 °C) (Infrared)   Ht 152.4 cm (60\")   Wt 56.7 kg (125 lb)   SpO2 98%   BMI 24.41 kg/m²     Physical Exam  Vitals and nursing note reviewed.   Constitutional:       General: She is not in acute distress.     Appearance: She is well-developed. She is not diaphoretic.   HENT:      Head: Normocephalic and atraumatic.      Right Ear: External ear normal.      Left Ear: External ear normal.      Nose: Nose normal.   Eyes:      Conjunctiva/sclera: Conjunctivae normal.      Pupils: Pupils are equal, round, and reactive to light.   Neck:      Thyroid: No thyromegaly.      Trachea: No tracheal deviation.   Cardiovascular:      Rate and Rhythm: Normal rate and regular rhythm.      Heart sounds: Normal heart sounds. No murmur heard.    No friction rub. No gallop.   Pulmonary:      Effort: Pulmonary effort is normal. No respiratory distress.      Breath sounds: Normal breath sounds.   Abdominal:      General: Bowel sounds are normal.      Palpations: Abdomen is soft. There is no mass.      Tenderness: " There is no abdominal tenderness. There is no guarding.   Musculoskeletal:         General: Normal range of motion.      Cervical back: Normal range of motion and neck supple.   Lymphadenopathy:      Cervical: No cervical adenopathy.   Skin:     General: Skin is warm and dry.      Capillary Refill: Capillary refill takes less than 2 seconds.      Findings: No rash.   Neurological:      Mental Status: She is alert and oriented to person, place, and time.      Motor: No abnormal muscle tone.      Deep Tendon Reflexes: Reflexes normal.   Psychiatric:         Behavior: Behavior normal.         Thought Content: Thought content normal.         Judgment: Judgment normal.         Recent Results (from the past 2016 hour(s))   POC INR    Collection Time: 09/22/22  1:11 PM    Specimen: Blood   Result Value Ref Range    INR 2.90 (A) 0.9 - 1.1   POC INR    Collection Time: 10/20/22  1:13 PM    Specimen: Blood   Result Value Ref Range    INR 2.10 (A) 2 - 3   POC INR    Collection Time: 11/17/22 12:00 AM    Specimen: Blood   Result Value Ref Range    INR 4.90 (A) 2 - 3     Assessment & Plan   Diagnoses and all orders for this visit:    1. Long term (current) use of anticoagulants (Primary)  -     POC INR    2. Atrial fibrillation, unspecified type (HCC)  -     POC INR    3. Primary hypertension  -     POC INR    4. Hypokalemia  -     potassium chloride (KAYCIEL) 20 mEq/15 mL solution; Take 15 mL by mouth Daily.  Dispense: 450 mL; Refill: 5  -     POC INR      Over anticoagulated.  Hold warfarin today and tomorrow then restart 3mg every day except 3 days a week 1.5 mg.  Recheck in 1-2 weeks.  If bleeding, passing out, dizziness, head trauma, etc then to ER.  Hypertension controlled and no changes are needed.  Discussed the potassium and difficulty swallowing the tablet.  Will try the liquid potassium.       · COVID-19 Precautions - Patient was compliant in wearing a mask. When I saw the patient, I used appropriate personal  protective equipment (PPE) including mask and eye shield (standard procedure).  Additionally, I used gown and gloves if indicated.  Hand hygiene was completed before and after seeing the patient.  · Dictated utilizing Dragon Dictation

## 2022-11-28 DIAGNOSIS — E87.6 HYPOKALEMIA: Primary | ICD-10-CM

## 2022-11-28 RX ORDER — POTASSIUM CHLORIDE 20 MEQ/1
20 TABLET, EXTENDED RELEASE ORAL DAILY
Qty: 90 TABLET | Refills: 0 | Status: SHIPPED | OUTPATIENT
Start: 2022-11-28 | End: 2023-05-27

## 2022-12-05 DIAGNOSIS — I48.20 CHRONIC ATRIAL FIBRILLATION: ICD-10-CM

## 2022-12-05 DIAGNOSIS — Z79.01 ANTICOAGULATED ON WARFARIN: ICD-10-CM

## 2022-12-05 RX ORDER — WARFARIN SODIUM 3 MG/1
TABLET ORAL
Qty: 90 TABLET | Refills: 0 | Status: SHIPPED | OUTPATIENT
Start: 2022-12-05

## 2022-12-05 NOTE — TELEPHONE ENCOUNTER
Rx Refill Note  Requested Prescriptions     Pending Prescriptions Disp Refills   • warfarin (COUMADIN) 3 MG tablet [Pharmacy Med Name: WARFARIN SODIUM 3 MG TABLET] 90 tablet 0     Sig: TAKE ONE TABLET BY MOUTH DAILY      Last office visit with prescribing clinician: 11/17/2022   Last telemedicine visit with prescribing clinician: Visit date not found   Next office visit with prescribing clinician: Visit date not found  LAST REFILL 06/13/2022

## 2022-12-14 NOTE — TELEPHONE ENCOUNTER
Ok for hub     Busy tone    Patient apparently was at [facility] was admitted underwent a thoracentesis of some sort and I have not seen the patient since.  I do not know if she is seeing a specialist but that is probably who she needs to follow-up with for these results as I really do not have anything from the hospitalization.

## 2022-12-15 ENCOUNTER — TELEMEDICINE (OUTPATIENT)
Dept: FAMILY MEDICINE CLINIC | Facility: CLINIC | Age: 82
End: 2022-12-15

## 2022-12-15 DIAGNOSIS — D62 ABLA (ACUTE BLOOD LOSS ANEMIA): ICD-10-CM

## 2022-12-15 DIAGNOSIS — C34.31: Primary | ICD-10-CM

## 2022-12-15 DIAGNOSIS — A41.9 SEPSIS DUE TO PNEUMONIA: ICD-10-CM

## 2022-12-15 DIAGNOSIS — J90 PLEURAL EFFUSION ON RIGHT: ICD-10-CM

## 2022-12-15 DIAGNOSIS — R09.02 HYPOXEMIA: ICD-10-CM

## 2022-12-15 DIAGNOSIS — R91.8 RIGHT LOWER LOBE LUNG MASS: ICD-10-CM

## 2022-12-15 DIAGNOSIS — Z51.5 FOLLOWED BY PALLIATIVE CARE SERVICE: ICD-10-CM

## 2022-12-15 DIAGNOSIS — J18.9 SEPSIS DUE TO PNEUMONIA: ICD-10-CM

## 2022-12-15 DIAGNOSIS — Z99.81 SUPPLEMENTAL OXYGEN DEPENDENT: ICD-10-CM

## 2022-12-15 PROCEDURE — 99214 OFFICE O/P EST MOD 30 MIN: CPT | Performed by: INTERNAL MEDICINE

## 2022-12-15 NOTE — PROGRESS NOTES
Subjective   Stacey Rojo is a 82 y.o. female.     Chief Complaint   Patient presents with   • Hospital follow-up   • Lung mass with shortness of breath       History of Present Illness   Within 48 business hours after discharge our office contacted her via telephone to coordinate her care and needs.  No flowsheet data found.  Risk for Readmission (LACE) No data recorded    You have chosen to receive care through a telehealth visit.  Do you consent to use a video/audio connection for your medical care today? Yes  Video visit completed utilizing Epic MyChart video conferencing.  Patient location in home in [facility].  Physician location in office in [facility].  Daughter was present during the history-taking and subsequent discussion (and for part of the physical exam) with this patient.  Patient agrees to the presence of the individual during this visit.      Patient was admitted to [facility]  ALL records were obtained and reviewed and /or discussed with admitting physician  Date of admission 12/1/2022  Date of discharge 12/10/2022  Diagnosis sepsis secondary to pneumonia, bronchogenic carcinoma right lower lobe, acute blood loss anemia  Hospital Course patient was evaluated in the hospital initially secondary to coughing up blood which shortness of breath.  She was noted to be taking warfarin and her hemoglobin was less than 7 in the emergency room.  Imaging revealed a suspicious mass in the right lower lobe.  She was also noted to have evidence of pneumonia.  Pulmonology evaluated.  Recommend holding warfarin and evaluating with biopsy.  Biopsy was attempted but she developed a pleural effusion in the interim and the biopsy was deferred.  She has been recommended have a thoracentesis for which she performed and her shortness of breath slightly improved but she does not have requirement for oxygen even at rest at 2 L/min.  She was treated with antibiotics and the patient determined that  she went on comfort care measures.  After she was discharged home after evaluation by palliative care team.  She will be followed as an outpatient by the hospice team.  Medications upon discharge alprazolam 0.5 mg 3 times daily as needed, Premarin vaginal cream twice a week, digoxin 0.125 mg daily, diltiazem  mg daily, Nexium 40 mg daily, furosemide 20 mg twice daily with 2 in the morning 1 in the afternoon, Klor-Con 20 mEq daily, metoprolol  mg tablet half tablet daily, pravastatin 20 mg daily.  Was instructed to hold the diclofenac gel within the warfarin.  Disposition Home with hospice  Follow up  PMD only  Currently c/o some short of breath and using oxygen per nasal canula  Condition stable    I reviewed and requested records labs and diagnostics from the hospital with the patient and family.  The patient is to follow-up with specialist as discussed and directed.  If any problems arise or further questions develop patient is to call or to contact us for any needs.        The following portions of the patient's history were reviewed and updated as appropriate: allergies, current medications, past family history, past medical history, past social history, past surgical history and problem list.    Past Medical History:   Diagnosis Date   • A-fib (HCC)    • Abnormality of lung    • Acquired insufficiency of aortic valve    • Antral gastritis    • Anxiety    • Asymptomatic postmenopausal status    • Atrial fibrillation (HCC)    • Body mass index (BMI) 24.0-24.9, adult    • Chest pain, atypical    • COPD (chronic obstructive pulmonary disease) ([facility])    • Diverticulosis of colon    • ASHLEE (generalized anxiety disorder)    • GERD (gastroesophageal reflux disease)    • Heart disease    • Hiatal hernia    • High risk medication use    • Hyperactive bowel sounds    • Hyperlipidemia    • Hypertension    • Insomnia    • Intermittent claudication ([facility])    • Internal hemorrhoids    • Lower extremity edema    •  Mitral insufficiency    • KOKO (obstructive sleep apnea)    • Osteopenia    • Pacemaker    • Pain of left patella    • Pedal edema    • Pulmonary hypertension (HCC)    • SSS (sick sinus syndrome) (HCC)    • Tricuspid regurgitation    • Urine frequency    • Uterovaginal prolapse    • Wheezing        Past Surgical History:   Procedure Laterality Date   • COLONOSCOPY  01/19/2015    diverticuli and hemorrhoids   • COLONOSCOPY  02/03/2009    diverticuli   • PACEMAKER IMPLANTATION     • PACEMAKER IMPLANTATION      SERIAL # [id number], model #[id number]   • UPPER GASTROINTESTINAL ENDOSCOPY  02/03/2009    small hiatal hernia   • UPPER GASTROINTESTINAL ENDOSCOPY  01/19/2015    sliding small hiatal hernia       Family History   Problem Relation Age of Onset   • Cancer Mother         oral-mouth   • Coronary artery disease Mother    • Cancer Father         oral-mouth   • Cancer Brother         cholangiocarcinoma/ oral-mouth   • Leukemia Brother        Social History     Socioeconomic History   • Marital status:    Tobacco Use   • Smoking status: Former   • Smokeless tobacco: Never   Substance and Sexual Activity   • Alcohol use: No   • Drug use: No   • Sexual activity: Defer       Current Outpatient Medications   Medication Sig Dispense Refill   • acetaminophen (TYLENOL) 500 MG tablet Take 500 mg by mouth Every 6 (Six) Hours As Needed for Mild Pain.     • ALPRAZolam (XANAX) 0.5 MG tablet Take 1 tablet by mouth 3 (Three) Times a Day As Needed for Anxiety. 90 tablet 1   • conjugated estrogens (Premarin) 0.625 MG/GM vaginal cream      • Diclofenac Sodium (VOLTAREN) 1 % gel gel Apply 4 g topically to the appropriate area as directed 4 (Four) Times a Day As Needed (knee pain). 350 g 2   • digoxin (LANOXIN) 125 MCG tablet TAKE ONE TABLET BY MOUTH DAILY 90 tablet 1   • dilTIAZem CD (CARDIZEM CD) 180 MG 24 hr capsule Take 1 capsule by mouth Daily. 90 capsule 1   • esomeprazole (nexIUM) 40 MG capsule Take 1 capsule by mouth Daily. 90  capsule 1   • furosemide (LASIX) 20 MG tablet TAKE TWO TABLETS BY MOUTH EVERY MORNING AND TAKE ONE TABLET BY MOUTH EVERY EVENING 270 tablet 1   • metoprolol succinate XL (TOPROL-XL) 50 MG 24 hr tablet Take 1 tablet by mouth Daily. 90 tablet 1   • potassium chloride (K-DUR,KLOR-CON) 20 MEQ CR tablet Take 1 tablet by mouth Daily for 180 days. 90 tablet 0   • pravastatin (PRAVACHOL) 20 MG tablet Take 1 tablet by mouth Daily. 90 tablet 3   • PROBIOTIC PRODUCT PO Take  by mouth.     • warfarin (COUMADIN) 3 MG tablet TAKE ONE TABLET BY MOUTH DAILY 90 tablet 0     No current facility-administered medications for this visit.       Review of Systems   Constitutional: Positive for fatigue. Negative for activity change, appetite change, fever, unexpected weight gain and unexpected weight loss.   HENT: Negative for nosebleeds, rhinorrhea, trouble swallowing and voice change.    Eyes: Negative for visual disturbance.   Respiratory: Positive for shortness of breath. Negative for cough, chest tightness and wheezing.    Cardiovascular: Negative for chest pain, palpitations and leg swelling.   Gastrointestinal: Negative for abdominal pain, blood in stool, constipation, diarrhea, nausea, vomiting, GERD and indigestion.   Genitourinary: Negative for dysuria, frequency and hematuria.   Musculoskeletal: Negative for arthralgias, back pain and myalgias.   Skin: Negative for rash and wound.   Neurological: Negative for dizziness, tremors, weakness, light-headedness, numbness, headache and memory problem.   Hematological: Negative for adenopathy. Does not bruise/bleed easily.   Psychiatric/Behavioral: Negative for sleep disturbance and depressed mood. The patient is not nervous/anxious.        Objective   There were no vitals filed for this visit.  There is no height or weight on file to calculate BMI.  Physical Exam  Vitals and nursing note reviewed.   Constitutional:       General: She is not in acute distress.     Appearance: She is  well-developed. She is not ill-appearing, toxic-appearing or diaphoretic.   HENT:      Head: Normocephalic and atraumatic.      Nose: Nose normal.   Neck:      Thyroid: No thyromegaly.      Trachea: No tracheal deviation.   Cardiovascular:      Heart sounds:     No gallop.   Pulmonary:      Effort: Pulmonary effort is normal. No respiratory distress.   Abdominal:      Palpations: There is no mass.   Neurological:      Mental Status: She is alert and oriented to person, place, and time. Mental status is at baseline.      Motor: No abnormal muscle tone.      Deep Tendon Reflexes: Reflexes normal.   Psychiatric:         Behavior: Behavior normal.         Thought Content: Thought content normal.         Judgment: Judgment normal.       Assessment & Plan   Diagnoses and all orders for this visit:    1. 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 Sepsis due to pneumonia (HCC)    3. Hypoxemia  -     Ambulatory Referral to Pulmonology    4. Supplemental oxygen dependent  -     Ambulatory Referral to Pulmonology    5. ABLA (acute blood loss anemia)    6. Right lower lobe lung mass  -     Ambulatory Referral to Pulmonology    7. Followed by palliative care service    8. Pleural effusion on right  -     Ambulatory Referral to Pulmonology    I discussed and reviewed with the patient and her daughter via video conferencing concerning the hospitalization at [facility] including a CT scan demonstrating the 5.1x 3.7 cm right lower lobe mass but CT angiogram and subsequent pleural effusion for which she had drained but is having still with hypoxia and shortness of breath.  Continue to use the oxygen per nasal cannula, at this time she does not want any surgery and I doubt she would be a candidate for this due to her shortness of breath and COPD.  At this time she is asking for palliative care and is to continue to follow-up with hospitalist.  I did recommend  that she may want to follow-up with the pulmonologist for further evaluation for comfort measures which may include treatment such as pleural effusion drainage if it were to develop into any worsening problems to make her more comfortable.  She is agreeable agreeable to this and we will do the referral for pulmonology.  Unfortunately no official biopsy or pathology results have come back.  We will try to obtain these if possible.      I spent 39 minutes caring for Stacey on this date of service. This time includes time spent by me in the following activities:preparing for the visit, reviewing tests, obtaining and/or reviewing a separately obtained history, performing a medically appropriate examination and/or evaluation , counseling and educating the patient/family/caregiver, referring and communicating with other health care professionals  and documenting information in the medical record  · COVID-19 Precautions - Patient was compliant in wearing a mask. When I saw the patient, I used appropriate personal protective equipment (PPE) including mask and eye shield (standard procedure).  Additionally, I used gown and gloves if indicated.  Hand hygiene was completed before and after seeing the patient.  · Dictated utilizing Dragon Dictation

## 2023-01-28 DIAGNOSIS — I10 ESSENTIAL HYPERTENSION: ICD-10-CM

## 2023-01-29 DIAGNOSIS — I10 PRIMARY HYPERTENSION: ICD-10-CM

## 2023-01-30 RX ORDER — SPIRONOLACTONE 25 MG/1
1 TABLET ORAL DAILY
COMMUNITY
Start: 2023-01-27

## 2023-01-30 RX ORDER — DEXAMETHASONE 1 MG
1 TABLET ORAL EVERY MORNING
COMMUNITY
Start: 2023-01-27

## 2023-01-30 RX ORDER — IPRATROPIUM BROMIDE AND ALBUTEROL SULFATE 2.5; .5 MG/3ML; MG/3ML
SOLUTION RESPIRATORY (INHALATION)
COMMUNITY
Start: 2022-12-19

## 2023-01-30 RX ORDER — HALOPERIDOL 2 MG/ML
SOLUTION ORAL
COMMUNITY
Start: 2022-12-28

## 2023-01-30 RX ORDER — MENTHOL 4.5 MG/1
1 LOZENGE ORAL
COMMUNITY
Start: 2022-12-21

## 2023-01-30 RX ORDER — METOPROLOL SUCCINATE 50 MG/1
TABLET, EXTENDED RELEASE ORAL
Qty: 90 TABLET | Refills: 1 | Status: SHIPPED | OUTPATIENT
Start: 2023-01-30

## 2023-01-30 RX ORDER — FUROSEMIDE 20 MG/1
TABLET ORAL
Qty: 270 TABLET | Refills: 1 | Status: SHIPPED | OUTPATIENT
Start: 2023-01-30

## 2023-01-30 NOTE — TELEPHONE ENCOUNTER
Rx Refill Note  Requested Prescriptions     Pending Prescriptions Disp Refills   • furosemide (LASIX) 20 MG tablet [Pharmacy Med Name: FUROSEMIDE 20 MG TABLET] 270 tablet 1     Sig: TAKE TWO TABLETS BY MOUTH EVERY MORNING AND TAKE ONE TABLET BY MOUTH EVERY EVENING      Last office visit with prescribing clinician: 11/17/2022   Last telemedicine visit with prescribing clinician: 12/15/22   Next office visit with prescribing clinician: Pt due for INR      Stefania Munoz MA  01/30/23, 08:22 EST

## 2023-01-30 NOTE — TELEPHONE ENCOUNTER
Rx Refill Note  Requested Prescriptions     Pending Prescriptions Disp Refills   • metoprolol succinate XL (TOPROL-XL) 50 MG 24 hr tablet [Pharmacy Med Name: METOPROLOL SUCC ER 50 MG TAB] 90 tablet 1     Sig: TAKE ONE TABLET BY MOUTH DAILY      Last office visit with prescribing clinician: 11/17/2022   Last telemedicine visit with prescribing clinician: Visit date not found   Next office visit with prescribing clinician: Visit date not found

## 2023-02-01 ENCOUNTER — TELEPHONE (OUTPATIENT)
Dept: FAMILY MEDICINE CLINIC | Facility: CLINIC | Age: 83
End: 2023-02-01
Payer: MEDICARE

## 2023-02-01 PROBLEM — Z79.01 LONG TERM (CURRENT) USE OF ANTICOAGULANTS: Status: RESOLVED | Noted: 2019-08-12 | Resolved: 2023-02-01

## 2023-02-01 NOTE — TELEPHONE ENCOUNTER
Pt is not on warfarin anymore and she is under the care of Hospice.  I will resolve he anticoag encounter.

## 2023-03-17 DIAGNOSIS — E78.5 HYPERLIPIDEMIA, UNSPECIFIED HYPERLIPIDEMIA TYPE: ICD-10-CM

## 2023-03-20 RX ORDER — PRAVASTATIN SODIUM 20 MG
TABLET ORAL
Qty: 90 TABLET | Refills: 3 | Status: SHIPPED | OUTPATIENT
Start: 2023-03-20

## 2023-07-01 NOTE — TELEPHONE ENCOUNTER
Rx Refill Note  Requested Prescriptions     Pending Prescriptions Disp Refills   • warfarin (COUMADIN) 3 MG tablet [Pharmacy Med Name: WARFARIN SODIUM 3 MG TABLET] 90 tablet 0     Sig: TAKE ONE TABLET BY MOUTH DAILY      Last office visit with prescribing clinician: 2/24/2022      Next office visit with prescribing clinician: Visit date not found            Quin Denson MA  04/04/22, 08:50 EDT  
Vaccine status unknown